# Patient Record
Sex: FEMALE | Race: WHITE | NOT HISPANIC OR LATINO
[De-identification: names, ages, dates, MRNs, and addresses within clinical notes are randomized per-mention and may not be internally consistent; named-entity substitution may affect disease eponyms.]

---

## 2019-07-09 PROBLEM — Z00.00 ENCOUNTER FOR PREVENTIVE HEALTH EXAMINATION: Status: ACTIVE | Noted: 2019-07-09

## 2019-07-19 ENCOUNTER — RESULT REVIEW (OUTPATIENT)
Age: 59
End: 2019-07-19

## 2019-07-19 ENCOUNTER — APPOINTMENT (OUTPATIENT)
Dept: MAMMOGRAPHY | Facility: IMAGING CENTER | Age: 59
End: 2019-07-19

## 2019-07-19 ENCOUNTER — OUTPATIENT (OUTPATIENT)
Dept: OUTPATIENT SERVICES | Facility: HOSPITAL | Age: 59
LOS: 1 days | End: 2019-07-19
Payer: COMMERCIAL

## 2019-07-19 DIAGNOSIS — Z00.8 ENCOUNTER FOR OTHER GENERAL EXAMINATION: ICD-10-CM

## 2019-07-19 PROCEDURE — 19081 BX BREAST 1ST LESION STRTCTC: CPT | Mod: LT

## 2019-07-19 PROCEDURE — 77065 DX MAMMO INCL CAD UNI: CPT

## 2019-07-19 PROCEDURE — A4648: CPT

## 2019-07-19 PROCEDURE — 19081 BX BREAST 1ST LESION STRTCTC: CPT

## 2019-07-19 PROCEDURE — 88305 TISSUE EXAM BY PATHOLOGIST: CPT | Mod: 26

## 2019-07-19 PROCEDURE — 77065 DX MAMMO INCL CAD UNI: CPT | Mod: 26,LT

## 2019-07-19 PROCEDURE — 88305 TISSUE EXAM BY PATHOLOGIST: CPT

## 2019-11-06 ENCOUNTER — OUTPATIENT (OUTPATIENT)
Dept: OUTPATIENT SERVICES | Facility: HOSPITAL | Age: 59
LOS: 1 days | End: 2019-11-06
Payer: COMMERCIAL

## 2019-11-06 VITALS
HEIGHT: 61 IN | TEMPERATURE: 99 F | RESPIRATION RATE: 16 BRPM | DIASTOLIC BLOOD PRESSURE: 80 MMHG | SYSTOLIC BLOOD PRESSURE: 120 MMHG | HEART RATE: 82 BPM | WEIGHT: 164.02 LBS | OXYGEN SATURATION: 98 %

## 2019-11-06 DIAGNOSIS — T85.44XA CAPSULAR CONTRACTURE OF BREAST IMPLANT, INITIAL ENCOUNTER: ICD-10-CM

## 2019-11-06 DIAGNOSIS — Z98.890 OTHER SPECIFIED POSTPROCEDURAL STATES: Chronic | ICD-10-CM

## 2019-11-06 DIAGNOSIS — Z86.39 PERSONAL HISTORY OF OTHER ENDOCRINE, NUTRITIONAL AND METABOLIC DISEASE: Chronic | ICD-10-CM

## 2019-11-06 LAB
ANION GAP SERPL CALC-SCNC: 14 MMO/L — SIGNIFICANT CHANGE UP (ref 7–14)
BUN SERPL-MCNC: 12 MG/DL — SIGNIFICANT CHANGE UP (ref 7–23)
CALCIUM SERPL-MCNC: 10 MG/DL — SIGNIFICANT CHANGE UP (ref 8.4–10.5)
CHLORIDE SERPL-SCNC: 102 MMOL/L — SIGNIFICANT CHANGE UP (ref 98–107)
CO2 SERPL-SCNC: 26 MMOL/L — SIGNIFICANT CHANGE UP (ref 22–31)
CREAT SERPL-MCNC: 0.75 MG/DL — SIGNIFICANT CHANGE UP (ref 0.5–1.3)
GLUCOSE SERPL-MCNC: 109 MG/DL — HIGH (ref 70–99)
HCT VFR BLD CALC: 38.9 % — SIGNIFICANT CHANGE UP (ref 34.5–45)
HGB BLD-MCNC: 12 G/DL — SIGNIFICANT CHANGE UP (ref 11.5–15.5)
MCHC RBC-ENTMCNC: 29.7 PG — SIGNIFICANT CHANGE UP (ref 27–34)
MCHC RBC-ENTMCNC: 30.8 % — LOW (ref 32–36)
MCV RBC AUTO: 96.3 FL — SIGNIFICANT CHANGE UP (ref 80–100)
NRBC # FLD: 0 K/UL — SIGNIFICANT CHANGE UP (ref 0–0)
PLATELET # BLD AUTO: 315 K/UL — SIGNIFICANT CHANGE UP (ref 150–400)
PMV BLD: 10.5 FL — SIGNIFICANT CHANGE UP (ref 7–13)
POTASSIUM SERPL-MCNC: 3.8 MMOL/L — SIGNIFICANT CHANGE UP (ref 3.5–5.3)
POTASSIUM SERPL-SCNC: 3.8 MMOL/L — SIGNIFICANT CHANGE UP (ref 3.5–5.3)
RBC # BLD: 4.04 M/UL — SIGNIFICANT CHANGE UP (ref 3.8–5.2)
RBC # FLD: 13.5 % — SIGNIFICANT CHANGE UP (ref 10.3–14.5)
SODIUM SERPL-SCNC: 142 MMOL/L — SIGNIFICANT CHANGE UP (ref 135–145)
WBC # BLD: 7.04 K/UL — SIGNIFICANT CHANGE UP (ref 3.8–10.5)
WBC # FLD AUTO: 7.04 K/UL — SIGNIFICANT CHANGE UP (ref 3.8–10.5)

## 2019-11-06 PROCEDURE — 93010 ELECTROCARDIOGRAM REPORT: CPT

## 2019-11-06 RX ORDER — SODIUM CHLORIDE 9 MG/ML
3 INJECTION INTRAMUSCULAR; INTRAVENOUS; SUBCUTANEOUS EVERY 8 HOURS
Refills: 0 | Status: DISCONTINUED | OUTPATIENT
Start: 2019-11-19 | End: 2020-01-09

## 2019-11-06 RX ORDER — SODIUM CHLORIDE 9 MG/ML
1000 INJECTION, SOLUTION INTRAVENOUS
Refills: 0 | Status: DISCONTINUED | OUTPATIENT
Start: 2019-11-19 | End: 2020-01-09

## 2019-11-06 NOTE — H&P PST ADULT - NEGATIVE CARDIOVASCULAR SYMPTOMS
no chest pain/no orthopnea/no paroxysmal nocturnal dyspnea/no peripheral edema/no palpitations/no claudication/no dyspnea on exertion

## 2019-11-06 NOTE — H&P PST ADULT - NSICDXPROBLEM_GEN_ALL_CORE_FT
PROBLEM DIAGNOSES  Problem: Capsular contracture of breast implant, initial encounter  Assessment and Plan: Scheduled for bilateral implant removal, bilateral breast lift on 11/19/19. Pre op instructions, famotidine, chlorhexidine gluconate soap given and explainaed. Pt verbalized understanding.

## 2019-11-06 NOTE — H&P PST ADULT - NEGATIVE GENERAL SYMPTOMS
no anorexia/no sweating/no fever/no weight gain/no polyuria/no malaise/no weight loss/no fatigue/no polydipsia/no chills

## 2019-11-06 NOTE — H&P PST ADULT - NSICDXPASTSURGICALHX_GEN_ALL_CORE_FT
PAST SURGICAL HISTORY:  History of augmentation of both breasts 02/2004 PAST SURGICAL HISTORY:  History of augmentation of both breasts 02/2004    History of obesity Endoscopic balloon procedure;  05/2018

## 2019-11-06 NOTE — H&P PST ADULT - NEGATIVE BREAST SYMPTOMS
no breast tenderness R/no nipple discharge L/no breast tenderness L/no breast lump R/no breast lump L/no nipple discharge R

## 2019-11-06 NOTE — H&P PST ADULT - NEGATIVE NEUROLOGICAL SYMPTOMS
no focal seizures/no syncope/no difficulty walking/no transient paralysis/no generalized seizures/no weakness/no tremors/no paresthesias/no vertigo/no loss of sensation

## 2019-11-06 NOTE — H&P PST ADULT - HISTORY OF PRESENT ILLNESS
58 y/o  female with PMH: Dyslipidemia 60 y/o  female with PMH: Dyslipidemia, Obesity presents to PST for pre op evaluation with pre op diagnosis Capsular contracture of breast implant, initial encounter. Now scheduled for bilateral implant removal, bilateral breast lift on 11/19/19

## 2019-11-06 NOTE — H&P PST ADULT - NEGATIVE GENERAL GENITOURINARY SYMPTOMS
no urinary hesitancy/no urine discoloration/normal libido/no dysuria/no hematuria/no renal colic/no flank pain R/no bladder infections/no gas in urine/no flank pain L/normal urinary frequency/no nocturia

## 2019-11-06 NOTE — H&P PST ADULT - NEGATIVE GASTROINTESTINAL SYMPTOMS
no change in bowel habits/no vomiting/no nausea/no diarrhea/no melena/no hematochezia/no jaundice/no hiccoughs/no abdominal pain/no steatorrhea

## 2019-11-06 NOTE — H&P PST ADULT - RS GEN PE MLT RESP DETAILS PC
clear to auscultation bilaterally/no chest wall tenderness/no subcutaneous emphysema/airway patent/no rhonchi/no wheezes/no intercostal retractions/breath sounds equal/respirations non-labored/no rales/good air movement

## 2019-11-06 NOTE — H&P PST ADULT - NEGATIVE OPHTHALMOLOGIC SYMPTOMS
no lacrimation L/no blurred vision R/no discharge R/no pain L/no photophobia/no lacrimation R/no blurred vision L/no pain R/no irritation R/no loss of vision L/no discharge L/no diplopia/no irritation L/no loss of vision R

## 2019-11-19 ENCOUNTER — OUTPATIENT (OUTPATIENT)
Dept: OUTPATIENT SERVICES | Facility: HOSPITAL | Age: 59
LOS: 1 days | Discharge: HOME | End: 2019-11-19
Payer: COMMERCIAL

## 2019-11-19 ENCOUNTER — RESULT REVIEW (OUTPATIENT)
Age: 59
End: 2019-11-19

## 2019-11-19 VITALS
OXYGEN SATURATION: 98 % | TEMPERATURE: 98 F | HEART RATE: 74 BPM | RESPIRATION RATE: 14 BRPM | HEIGHT: 61 IN | DIASTOLIC BLOOD PRESSURE: 64 MMHG | SYSTOLIC BLOOD PRESSURE: 113 MMHG | WEIGHT: 164.02 LBS

## 2019-11-19 DIAGNOSIS — Z86.39 PERSONAL HISTORY OF OTHER ENDOCRINE, NUTRITIONAL AND METABOLIC DISEASE: Chronic | ICD-10-CM

## 2019-11-19 DIAGNOSIS — T85.44XA CAPSULAR CONTRACTURE OF BREAST IMPLANT, INITIAL ENCOUNTER: ICD-10-CM

## 2019-11-19 DIAGNOSIS — Z98.890 OTHER SPECIFIED POSTPROCEDURAL STATES: Chronic | ICD-10-CM

## 2019-11-19 PROCEDURE — 19316 MASTOPEXY: CPT | Mod: RT,LT

## 2019-11-19 PROCEDURE — 19328 RMVL INTACT BREAST IMPLANT: CPT

## 2019-11-19 PROCEDURE — 88305 TISSUE EXAM BY PATHOLOGIST: CPT | Mod: 26

## 2019-11-19 RX ORDER — OXYCODONE HYDROCHLORIDE 5 MG/1
5 TABLET ORAL ONCE
Refills: 0 | Status: DISCONTINUED | OUTPATIENT
Start: 2019-11-19 | End: 2019-11-19

## 2019-11-19 RX ORDER — FENTANYL CITRATE 50 UG/ML
25 INJECTION INTRAVENOUS
Refills: 0 | Status: DISCONTINUED | OUTPATIENT
Start: 2019-11-19 | End: 2019-11-19

## 2019-11-19 RX ORDER — ONDANSETRON 8 MG/1
4 TABLET, FILM COATED ORAL ONCE
Refills: 0 | Status: COMPLETED | OUTPATIENT
Start: 2019-11-19 | End: 2019-11-19

## 2019-11-19 RX ADMIN — FENTANYL CITRATE 25 MICROGRAM(S): 50 INJECTION INTRAVENOUS at 23:30

## 2019-11-19 RX ADMIN — ONDANSETRON 4 MILLIGRAM(S): 8 TABLET, FILM COATED ORAL at 23:15

## 2019-11-19 NOTE — ASU DISCHARGE PLAN (ADULT/PEDIATRIC) - CALL YOUR DOCTOR IF YOU HAVE ANY OF THE FOLLOWING:
Bleeding that does not stop/Fever greater than (need to indicate Fahrenheit or Celsius)/Wound/Surgical Site with redness, or foul smelling discharge or pus/Pain not relieved by Medications/Swelling that gets worse

## 2019-11-19 NOTE — ASU DISCHARGE PLAN (ADULT/PEDIATRIC) - ASU DC SPECIAL INSTRUCTIONSFT
Please follow all instructions from Dr. Geiger.    Please keep your head elevated while in bed.    Keep surgical bra on until cleared.    Please keep your dressing clean, dry, and in place until follow up.    No exercising, strenuous activity, or heavy lifting. Keep your arms below your head and your elbows below your shoulders.    Take medications as prescribed from Dr. Geiger's office.    Please follow up with Dr. Geiger within x1 week after discharge from the hospital. You may call [phone] to schedule an appointment.

## 2019-11-19 NOTE — ASU DISCHARGE PLAN (ADULT/PEDIATRIC) - CARE PROVIDER_API CALL
Jackson Geiger)  Plastic Surgery  [address] [place] [address], [address]  [place] [address]  Phone: [phone]  Fax: [phone]  Follow Up Time:

## 2019-11-19 NOTE — ASU PATIENT PROFILE, ADULT - PSH
History of augmentation of both breasts  02/2004  History of obesity  Endoscopic balloon procedure;  05/2018

## 2019-11-19 NOTE — ASU PATIENT PROFILE, ADULT - REASON FOR ADMISSION, PROFILE
[facility] Initial Evaluation  1/6/2017     Subjective Exam:  Zo Qureshi is a 38 year old female patient presenting to Physical Therapy with the following Primary Symptoms:Bilateral shoulder pain (L>R).  She reports related symptoms in left shoulder spreading no further than the left elbow. Pain in right arm extends down to digits 1-3 with tingling and pain noted in those 3 fingers. Left shoulder pains are described as constant. Right shoulder pain is more intermittent and experiences worsened finger symptoms when she has shoulder pain.  She denies having painful cough/sneeze, saddle anaesthesia, severe night pain, peripheral motor deficit, recent bowel/bladder change, recent vision change, ringing in the ears or pain with swallowing. Left shoulder pain is rated 4/10 at rest, and 7/10 at worst. Right arm pain is 0/10 at rest, 4/10 at worst. History of symptoms: Left shoulder pain began gradually 10/1/16  as the result of no specific episode or injury.  Right arm symptoms began in 8/1/16 as the result of no specific episode or injury. Notes that she started picking up extra work shifts in the summer, moving from 40 hour week to 60 hour week and B symptoms were more intermittent and worsened over the past 4 months. Previous treatment has included Heat, Tylenol, Other medications: muscle relaxants, Rest Supine.   Since onset, both shoulder pains are getting worse.  Current Symptoms are worsened by: work, lying on either side, reaching overhead increases left shoulder pain, reaching behind back with either shoulder, gripping with right hand, carrying objects, washing dishes >10 min. Current Symptoms are relieved by resting, avoiding aggravating factors.   Recent surgical procedure: none   General health as reported by patient is:  good.  Pertinent medical history: She denies any significant current illness or recent hospital admissions. She denies any regonal implanted devices.  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     Patient also c/o right sided abdominal pain that began suddenly in the night on 1/4/16 that woke her up. Was unable to fall asleep, pain improved with Tylenol and was better in the am. Pains returned later in the morning with chills and sweating, went to wellness room at work to rest and pain was not getting better. Saw MD that afternoon (1/5/16) who conducted lab tests which were negative and felt it might be a muscle strain. Pain is constant in nature but can fluctuate in intensity with use of Tylenol. Reports her work does involve twisting and reaching but denies onset of pain or experiencing any similar episode of abdominal pain during work. Has appointment scheduled to follow up with family physician on 1/9/17.    SHOULDER:  Demonstration of Work Posture: Repetitive cervical protrusion, rounding of shoulders, thoracic flexion and fine motor use of hands to work on hearing aids.     Cervical Screen: full ROM without reproduction of any arm symptoms  Elbow Screen: R elbow 5-0-135 pain in triceps with flexion OP, L elbow 4-0-135 pain in back of shoulder with flexion OP    Shoulder ROM: (* indicates patient's pain: superior shoulder pain)   AROM R AROM L PROM R PROM L   Flex 130* 120*     Abd 110* 87*     ER at 0 80 80     Ext/IR T12* L5*       Shoulder Strength:   MMT L MMT R   IR at 0 Weak & Painful Strong & Painful   ER at 0 Weak & Painful Strong & Painful   Flexion Strong & Pain free Strong & Pain free   Abduction Weak & Painful Strong & Painful     Special tests:   L R   Impingement     Empty Can + +   Full Can + +     HPI  System  Physical Exam  General   ROS    Assessment/Plan:      Patient is a 38 year old female with both sides shoulder, right  finger complaints.  At this time, patient's right abdominal pain does not appear to be musculoskeletal in origin due to sudden onset of severe pain without injury and no report of symptoms at work prior to the night pain. Referred patient back to MD regarding abdominal pain and instructed patient to seek emergent medical care if pain worsens significantly or if she has a fever/sweats again.     Patient has the following significant findings with corresponding treatment plan.                Diagnosis 1:  Bilateral shoulder rotator cuff tendinopathy  Pain -  hot/cold therapy, electric stimulation, manual therapy and education  Decreased ROM/flexibility - manual therapy and therapeutic exercise  Decreased strength - therapeutic exercise and therapeutic activities  Impaired muscle performance - neuro re-education  Decreased function - therapeutic activities  Impaired posture - neuro re-education    Therapy Evaluation Codes:   1) History comprised of:   Personal factors that impact the plan of care:      Work hours and repetitive work tasks.    Comorbidity factors that impact the plan of care are:      Numbness/tingling and Suddent onset of right abdominal pain at night.     Medications impacting care: Anti-inflammatory.  2) Examination of Body Systems comprised of:   Body structures and functions that impact the plan of care:      Shoulder.   Activity limitations that impact the plan of care are:      Bathing, Dressing, Grasping, Lifting, Working, Sleeping and Reaching.  3) Clinical presentation characteristics are:   Evolving/Changing.  4) Decision-Making    Moderate complexity using standardized patient assessment instrument and/or measureable assessment of functional outcome.  Cumulative Therapy Evaluation is: Moderate complexity.    Previous and current functional limitations:  (See Goal Flow Sheet for this information)    Short term and Long term goals: (See Goal Flow Sheet for this information)     Communication  ability:  Patient appears to be able to clearly communicate and understand verbal and written communication and follow directions correctly.  Treatment Explanation - The following has been discussed with the patient:   RX ordered/plan of care  Anticipated outcomes  Possible risks and side effects  This patient would benefit from PT intervention to resume normal activities.   Rehab potential is good.    Frequency:  1 X week, once daily  Duration:  for 8 weeks  Discharge Plan:  Achieve all LTG.  Independent in home treatment program.  Reach maximal therapeutic benefit.    Please refer to the daily flowsheet for treatment today, total treatment time and time spent performing 1:1 timed codes.            I am here to have implants from breast removed and bilateral lifts

## 2019-11-20 VITALS
SYSTOLIC BLOOD PRESSURE: 104 MMHG | DIASTOLIC BLOOD PRESSURE: 63 MMHG | RESPIRATION RATE: 15 BRPM | HEART RATE: 83 BPM | OXYGEN SATURATION: 98 % | TEMPERATURE: 98 F

## 2019-11-22 LAB — SURGICAL PATHOLOGY STUDY: SIGNIFICANT CHANGE UP

## 2020-08-19 ENCOUNTER — RESULT REVIEW (OUTPATIENT)
Age: 60
End: 2020-08-19

## 2020-08-19 ENCOUNTER — APPOINTMENT (OUTPATIENT)
Dept: MAMMOGRAPHY | Facility: IMAGING CENTER | Age: 60
End: 2020-08-19
Payer: COMMERCIAL

## 2020-08-19 ENCOUNTER — APPOINTMENT (OUTPATIENT)
Dept: ULTRASOUND IMAGING | Facility: IMAGING CENTER | Age: 60
End: 2020-08-19
Payer: COMMERCIAL

## 2020-08-19 ENCOUNTER — OUTPATIENT (OUTPATIENT)
Dept: OUTPATIENT SERVICES | Facility: HOSPITAL | Age: 60
LOS: 1 days | End: 2020-08-19
Payer: COMMERCIAL

## 2020-08-19 DIAGNOSIS — Z86.39 PERSONAL HISTORY OF OTHER ENDOCRINE, NUTRITIONAL AND METABOLIC DISEASE: Chronic | ICD-10-CM

## 2020-08-19 DIAGNOSIS — Z00.8 ENCOUNTER FOR OTHER GENERAL EXAMINATION: ICD-10-CM

## 2020-08-19 DIAGNOSIS — Z98.890 OTHER SPECIFIED POSTPROCEDURAL STATES: Chronic | ICD-10-CM

## 2020-08-19 PROBLEM — E66.9 OBESITY, UNSPECIFIED: Chronic | Status: ACTIVE | Noted: 2019-11-06

## 2020-08-19 PROBLEM — E78.5 HYPERLIPIDEMIA, UNSPECIFIED: Chronic | Status: ACTIVE | Noted: 2019-11-06

## 2020-08-19 PROCEDURE — 77066 DX MAMMO INCL CAD BI: CPT | Mod: 26

## 2020-08-19 PROCEDURE — 77066 DX MAMMO INCL CAD BI: CPT

## 2020-08-19 PROCEDURE — G0279: CPT | Mod: 26

## 2020-08-19 PROCEDURE — 76641 ULTRASOUND BREAST COMPLETE: CPT | Mod: 26,50

## 2020-08-19 PROCEDURE — 76641 ULTRASOUND BREAST COMPLETE: CPT

## 2020-08-19 PROCEDURE — G0279: CPT

## 2020-08-25 ENCOUNTER — OUTPATIENT (OUTPATIENT)
Dept: OUTPATIENT SERVICES | Facility: HOSPITAL | Age: 60
LOS: 1 days | End: 2020-08-25
Payer: COMMERCIAL

## 2020-08-25 ENCOUNTER — RESULT REVIEW (OUTPATIENT)
Age: 60
End: 2020-08-25

## 2020-08-25 ENCOUNTER — APPOINTMENT (OUTPATIENT)
Dept: MAMMOGRAPHY | Facility: IMAGING CENTER | Age: 60
End: 2020-08-25
Payer: COMMERCIAL

## 2020-08-25 DIAGNOSIS — R92.8 OTHER ABNORMAL AND INCONCLUSIVE FINDINGS ON DIAGNOSTIC IMAGING OF BREAST: ICD-10-CM

## 2020-08-25 DIAGNOSIS — Z86.39 PERSONAL HISTORY OF OTHER ENDOCRINE, NUTRITIONAL AND METABOLIC DISEASE: Chronic | ICD-10-CM

## 2020-08-25 DIAGNOSIS — Z98.890 OTHER SPECIFIED POSTPROCEDURAL STATES: Chronic | ICD-10-CM

## 2020-08-25 PROCEDURE — 19081 BX BREAST 1ST LESION STRTCTC: CPT

## 2020-08-25 PROCEDURE — 77066 DX MAMMO INCL CAD BI: CPT

## 2020-08-25 PROCEDURE — 88305 TISSUE EXAM BY PATHOLOGIST: CPT

## 2020-08-25 PROCEDURE — A4648: CPT

## 2020-08-25 PROCEDURE — 19082 BX BREAST ADD LESION STRTCTC: CPT | Mod: RT

## 2020-08-25 PROCEDURE — 77066 DX MAMMO INCL CAD BI: CPT | Mod: 26

## 2020-08-25 PROCEDURE — 19081 BX BREAST 1ST LESION STRTCTC: CPT | Mod: LT

## 2020-08-25 PROCEDURE — 19082 BX BREAST ADD LESION STRTCTC: CPT

## 2020-08-25 PROCEDURE — 88305 TISSUE EXAM BY PATHOLOGIST: CPT | Mod: 26

## 2020-08-28 ENCOUNTER — RESULT REVIEW (OUTPATIENT)
Age: 60
End: 2020-08-28

## 2020-08-28 ENCOUNTER — APPOINTMENT (OUTPATIENT)
Dept: ULTRASOUND IMAGING | Facility: IMAGING CENTER | Age: 60
End: 2020-08-28
Payer: COMMERCIAL

## 2020-08-28 ENCOUNTER — OUTPATIENT (OUTPATIENT)
Dept: OUTPATIENT SERVICES | Facility: HOSPITAL | Age: 60
LOS: 1 days | End: 2020-08-28
Payer: COMMERCIAL

## 2020-08-28 DIAGNOSIS — Z86.39 PERSONAL HISTORY OF OTHER ENDOCRINE, NUTRITIONAL AND METABOLIC DISEASE: Chronic | ICD-10-CM

## 2020-08-28 DIAGNOSIS — Z98.890 OTHER SPECIFIED POSTPROCEDURAL STATES: Chronic | ICD-10-CM

## 2020-08-28 DIAGNOSIS — R92.8 OTHER ABNORMAL AND INCONCLUSIVE FINDINGS ON DIAGNOSTIC IMAGING OF BREAST: ICD-10-CM

## 2020-08-28 PROCEDURE — 19083 BX BREAST 1ST LESION US IMAG: CPT

## 2020-08-28 PROCEDURE — 77065 DX MAMMO INCL CAD UNI: CPT

## 2020-08-28 PROCEDURE — 88360 TUMOR IMMUNOHISTOCHEM/MANUAL: CPT

## 2020-08-28 PROCEDURE — 88360 TUMOR IMMUNOHISTOCHEM/MANUAL: CPT | Mod: 26

## 2020-08-28 PROCEDURE — 19084 BX BREAST ADD LESION US IMAG: CPT | Mod: LT

## 2020-08-28 PROCEDURE — 19083 BX BREAST 1ST LESION US IMAG: CPT | Mod: LT

## 2020-08-28 PROCEDURE — 19084 BX BREAST ADD LESION US IMAG: CPT

## 2020-08-28 PROCEDURE — 77065 DX MAMMO INCL CAD UNI: CPT | Mod: 26,LT

## 2020-08-28 PROCEDURE — A4648: CPT

## 2020-08-28 PROCEDURE — 88305 TISSUE EXAM BY PATHOLOGIST: CPT

## 2020-08-28 PROCEDURE — 88305 TISSUE EXAM BY PATHOLOGIST: CPT | Mod: 26

## 2020-08-31 LAB — SURGICAL PATHOLOGY STUDY: SIGNIFICANT CHANGE UP

## 2020-09-02 ENCOUNTER — APPOINTMENT (OUTPATIENT)
Dept: SURGICAL ONCOLOGY | Facility: CLINIC | Age: 60
End: 2020-09-02
Payer: COMMERCIAL

## 2020-09-02 VITALS
SYSTOLIC BLOOD PRESSURE: 120 MMHG | HEIGHT: 62 IN | HEART RATE: 68 BPM | WEIGHT: 170 LBS | OXYGEN SATURATION: 99 % | BODY MASS INDEX: 31.28 KG/M2 | DIASTOLIC BLOOD PRESSURE: 78 MMHG

## 2020-09-02 PROCEDURE — 99245 OFF/OP CONSLTJ NEW/EST HI 55: CPT

## 2020-09-02 NOTE — ASSESSMENT
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 I explained that breast cancer can be treated with 2 main surgical approaches. One is breast conserving therapy. The other is mastectomy with or without immediate reconstruction by a plastic surgeon. Breast conserving therapy involves wide excision of the involved area. Negative margins must be achieved with this wide excision. In addition, evaluation of the lymph nodes either with a sentinel lymph node biopsy or an axillary lymph node dissection may be required. This treatment usually requires postoperative radiation to the breast. Treatment with mastectomy also involves evaluation of the lymph node with a sentinel lymph node biopsy or axillary lymph node dissection. Post operative radiation therapy may be needed even after mastectomy in certain advanced cases.\par  \par

## 2020-09-02 NOTE — CONSULT LETTER
[Consult Letter:] : I had the pleasure of evaluating your patient, [unfilled]. [Dear  ___] : Dear  [unfilled], [Please see my note below.] : Please see my note below. [Consult Closing:] : Thank you very much for allowing me to participate in the care of this patient.  If you have any questions, please do not hesitate to contact me. [Sincerely,] : Sincerely, [FreeTextEntry2] : Jackson Geiger MD [FreeTextEntry3] : Husam Rankin M.D.\par

## 2020-09-02 NOTE — HISTORY OF PRESENT ILLNESS
[de-identified] : Ms. Jazlyn Damon is a 60 y/o female with newly diagnosed left breast cancer present for initial consultation. \par \par The patient reportedly felt a lump in her left breast for 3 to 4 months. She has no personal history of cancer. She has a history of implant(s) removed in 2019 and left stereotactic guided biopsy in 2019 - benign.\par \par MMG/[facility] on 8/19/2020: IMPRESSION:\par - New irregular mass with calcifications and nipple retraction in the left retroareolar location on mammography, corresponding to a mass in the left breast 6:00 location 2 cm from the nipple on the concurrent breast ultrasound. Ultrasound-guided core biopsy is recommended for diagnosis.\par - Abnormal left axillary lymph nodes on mammography and sonography, including lymph nodes demonstrating microcalcifications. Ultrasound-guided core biopsy of a representative abnormal appearing left axillary lymph node with calcifications is recommended.\par - Faint loosely grouped calcifications in the upper central left breast and the upper central right breast which are indeterminate. Sampling stereotactic biopsy of both breasts is recommended.\par - Loosely grouped predominantly coarse dystrophic calcifications associated with areas of central lucency in the posterior medial right breast which are thought to represent fat necrosis. Six-month follow-up right diagnostic mammography is recommended.\par BI-RADS 5\par \par 8/28/2020 : FN core biopsy:\par \par 1. Breast, left, 6 o'clock, 2 cm FN- Invasive poorly differentiated ductal carcinoma with areas of micropapillary features.  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 Breast, left, axillary lymph node, core biopsy\par - Invasive poorly differentiated ductal carcinoma with mild lymphocytic infiltrate\par \par Denies nipple discharge, skin changes, inversion or breast pain. Denies constitutional symptoms \par \par Referring MD ...Leslie Geiger

## 2020-09-02 NOTE — PHYSICAL EXAM
[Normal] : supple, no neck mass and thyroid not enlarged [Normal Supraclavicular Lymph Nodes] : normal supraclavicular lymph nodes [Normal Axillary Lymph Nodes] : normal axillary lymph nodes [Normal] : oriented to person, place and time, with appropriate affect [de-identified] : Normal S1, S2.  Regular rate and rhythm.  [de-identified] : see image [de-identified] : Clear breath sounds bilaterally, normal respiratory effort

## 2020-09-08 ENCOUNTER — APPOINTMENT (OUTPATIENT)
Dept: MRI IMAGING | Facility: IMAGING CENTER | Age: 60
End: 2020-09-08
Payer: COMMERCIAL

## 2020-09-08 ENCOUNTER — RESULT REVIEW (OUTPATIENT)
Age: 60
End: 2020-09-08

## 2020-09-08 ENCOUNTER — OUTPATIENT (OUTPATIENT)
Dept: OUTPATIENT SERVICES | Facility: HOSPITAL | Age: 60
LOS: 1 days | End: 2020-09-08
Payer: COMMERCIAL

## 2020-09-08 ENCOUNTER — TRANSCRIPTION ENCOUNTER (OUTPATIENT)
Age: 60
End: 2020-09-08

## 2020-09-08 DIAGNOSIS — Z98.890 OTHER SPECIFIED POSTPROCEDURAL STATES: Chronic | ICD-10-CM

## 2020-09-08 DIAGNOSIS — Z00.8 ENCOUNTER FOR OTHER GENERAL EXAMINATION: ICD-10-CM

## 2020-09-08 DIAGNOSIS — Z86.39 PERSONAL HISTORY OF OTHER ENDOCRINE, NUTRITIONAL AND METABOLIC DISEASE: Chronic | ICD-10-CM

## 2020-09-08 PROCEDURE — A9585: CPT

## 2020-09-08 PROCEDURE — C8908: CPT

## 2020-09-08 PROCEDURE — 77049 MRI BREAST C-+ W/CAD BI: CPT | Mod: 26

## 2020-09-08 PROCEDURE — C8937: CPT

## 2020-09-10 ENCOUNTER — OUTPATIENT (OUTPATIENT)
Dept: OUTPATIENT SERVICES | Facility: HOSPITAL | Age: 60
LOS: 1 days | Discharge: HOME | End: 2020-09-10

## 2020-09-10 DIAGNOSIS — Z86.39 PERSONAL HISTORY OF OTHER ENDOCRINE, NUTRITIONAL AND METABOLIC DISEASE: Chronic | ICD-10-CM

## 2020-09-10 DIAGNOSIS — C50.919 MALIGNANT NEOPLASM OF UNSPECIFIED SITE OF UNSPECIFIED FEMALE BREAST: ICD-10-CM

## 2020-09-10 DIAGNOSIS — Z98.890 OTHER SPECIFIED POSTPROCEDURAL STATES: Chronic | ICD-10-CM

## 2020-09-11 ENCOUNTER — APPOINTMENT (OUTPATIENT)
Dept: HEMATOLOGY ONCOLOGY | Facility: CLINIC | Age: 60
End: 2020-09-11
Payer: COMMERCIAL

## 2020-09-11 ENCOUNTER — RESULT REVIEW (OUTPATIENT)
Age: 60
End: 2020-09-11

## 2020-09-11 VITALS
TEMPERATURE: 98.8 F | HEIGHT: 61.02 IN | DIASTOLIC BLOOD PRESSURE: 70 MMHG | WEIGHT: 171.74 LBS | SYSTOLIC BLOOD PRESSURE: 103 MMHG | BODY MASS INDEX: 32.42 KG/M2 | OXYGEN SATURATION: 98 % | RESPIRATION RATE: 16 BRPM | HEART RATE: 68 BPM

## 2020-09-11 DIAGNOSIS — Z80.0 FAMILY HISTORY OF MALIGNANT NEOPLASM OF DIGESTIVE ORGANS: ICD-10-CM

## 2020-09-11 DIAGNOSIS — Z86.39 PERSONAL HISTORY OF OTHER ENDOCRINE, NUTRITIONAL AND METABOLIC DISEASE: ICD-10-CM

## 2020-09-11 DIAGNOSIS — Z80.3 FAMILY HISTORY OF MALIGNANT NEOPLASM OF BREAST: ICD-10-CM

## 2020-09-11 DIAGNOSIS — R23.2 FLUSHING: ICD-10-CM

## 2020-09-11 DIAGNOSIS — Z80.1 FAMILY HISTORY OF MALIGNANT NEOPLASM OF TRACHEA, BRONCHUS AND LUNG: ICD-10-CM

## 2020-09-11 DIAGNOSIS — Z72.89 OTHER PROBLEMS RELATED TO LIFESTYLE: ICD-10-CM

## 2020-09-11 DIAGNOSIS — Z87.891 PERSONAL HISTORY OF NICOTINE DEPENDENCE: ICD-10-CM

## 2020-09-11 LAB
BASOPHILS # BLD AUTO: 0.04 K/UL — SIGNIFICANT CHANGE UP (ref 0–0.2)
BASOPHILS NFR BLD AUTO: 0.8 % — SIGNIFICANT CHANGE UP (ref 0–2)
EOSINOPHIL # BLD AUTO: 0.09 K/UL — SIGNIFICANT CHANGE UP (ref 0–0.5)
EOSINOPHIL NFR BLD AUTO: 1.7 % — SIGNIFICANT CHANGE UP (ref 0–6)
HCT VFR BLD CALC: 40.1 % — SIGNIFICANT CHANGE UP (ref 34.5–45)
HGB BLD-MCNC: 13.1 G/DL — SIGNIFICANT CHANGE UP (ref 11.5–15.5)
IMM GRANULOCYTES NFR BLD AUTO: 0.2 % — SIGNIFICANT CHANGE UP (ref 0–1.5)
LYMPHOCYTES # BLD AUTO: 1.81 K/UL — SIGNIFICANT CHANGE UP (ref 1–3.3)
LYMPHOCYTES # BLD AUTO: 34.3 % — SIGNIFICANT CHANGE UP (ref 13–44)
MCHC RBC-ENTMCNC: 31.5 PG — SIGNIFICANT CHANGE UP (ref 27–34)
MCHC RBC-ENTMCNC: 32.7 G/DL — SIGNIFICANT CHANGE UP (ref 32–36)
MCV RBC AUTO: 96.4 FL — SIGNIFICANT CHANGE UP (ref 80–100)
MONOCYTES # BLD AUTO: 0.41 K/UL — SIGNIFICANT CHANGE UP (ref 0–0.9)
MONOCYTES NFR BLD AUTO: 7.8 % — SIGNIFICANT CHANGE UP (ref 2–14)
NEUTROPHILS # BLD AUTO: 2.91 K/UL — SIGNIFICANT CHANGE UP (ref 1.8–7.4)
NEUTROPHILS NFR BLD AUTO: 55.2 % — SIGNIFICANT CHANGE UP (ref 43–77)
NRBC # BLD: 0 /100 WBCS — SIGNIFICANT CHANGE UP (ref 0–0)
PLATELET # BLD AUTO: 246 K/UL — SIGNIFICANT CHANGE UP (ref 150–400)
RBC # BLD: 4.16 M/UL — SIGNIFICANT CHANGE UP (ref 3.8–5.2)
RBC # FLD: 13.2 % — SIGNIFICANT CHANGE UP (ref 10.3–14.5)
WBC # BLD: 5.27 K/UL — SIGNIFICANT CHANGE UP (ref 3.8–10.5)
WBC # FLD AUTO: 5.27 K/UL — SIGNIFICANT CHANGE UP (ref 3.8–10.5)

## 2020-09-11 PROCEDURE — 99205 OFFICE O/P NEW HI 60 MIN: CPT

## 2020-09-11 RX ORDER — SIMVASTATIN 80 MG/1
TABLET, FILM COATED ORAL
Refills: 0 | Status: ACTIVE | COMMUNITY

## 2020-09-11 RX ORDER — CHROMIUM 200 MCG
TABLET ORAL
Refills: 0 | Status: ACTIVE | COMMUNITY

## 2020-09-11 NOTE — CONSULT LETTER
[Dear  ___] : Dear  [unfilled], [Consult Letter:] : I had the pleasure of evaluating your patient, [unfilled]. [Please see my note below.] : Please see my note below. [Referral Closing:] : Thank you very much for seeing this patient.  If you have any questions, please do not hesitate to contact me. [Sincerely,] : Sincerely, [FreeTextEntry3] : Cassie Bell MD\par Division of Medical Oncology and Hematology\par [facility]\par [employer] [facility]\par [place], [place] [DrLeslie  ___] : Dr. ENAMORADO

## 2020-09-11 NOTE — PHYSICAL EXAM
[Fully active, able to carry on all pre-disease performance without restriction] : Status 0 - Fully active, able to carry on all pre-disease performance without restriction [Normal] : affect appropriate [de-identified] : BL breasts with mastopexy scars. Left breast with 5x 5 cm hard retroareolar mass. Left nipple flattened/inverted. + Left axillary LN, No SCLn

## 2020-09-11 NOTE — HISTORY OF PRESENT ILLNESS
[de-identified] : Ms.Joanne Damon is a 60 year old female here for an evaluation of breast cancer. Her oncologic history is as follows:\par \par Patient noticed left breast mass for 4 months and presented to plastic surgeon's office. She was sent for breast imaging on 8/19/2020 BIRADS 5 which showed new irregular mass with calcifications and nipple retraction in the left retroareolar location on mammography, corresponding to a mass in the left breast 6:00 location 2 cm from the nipple on the concurrent breast ultrasound. Ultrasound-guided core biopsy is recommended for diagnosis.\par - Abnormal left axillary lymph nodes on mammography and sonography, including lymph nodes demonstrating microcalcifications. Ultrasound-guided core biopsy of a representative abnormal appearing left axillary lymph node with calcifications is recommended.\par - Faint loosely grouped calcifications in the upper central left breast and the upper central right breast which are indeterminate. Sampling stereotactic biopsy of both breasts is recommended.\par - Loosely grouped predominantly coarse dystrophic calcifications associated with areas of central lucency in the posterior medial right breast which are thought to represent fat necrosis. Six-month follow-up right diagnostic mammography is recommended. She underwent New irregular mass with calcifications and nipple retraction in the left retroareolar location on mammography, corresponding to a mass in the left breast 6:00 location 2 cm from the nipple on the concurrent breast ultrasound. Ultrasound-guided core biopsy is recommended for diagnosis.\par  She had a left, 6 o'clock, 2 cm FN, core biopsy on on 8/28/2020 which showed Invasive poorly differentiated ductal carcinoma with areas of micropapillary features\par - Estella score 9/9 (3 + 3 + 3)\par - Invasive tumor measures at least 1.0 cm\par - Ductal carcinoma in situ (focal), cribriform pattern with\par high nuclear grade and central necrosis\par \par  Breast, left, axillary lymph node, core biopsy\par - Invasive poorly differentiated ductal carcinoma with mild lymphocytic infiltrate\par ER+ 80%,MA+ 50%, HER 2 + breast cancer\par \par She has been on Prempro x 7 years for hot flashes. She is off now x 2 weeks. Hot flashes are ok\par She had breast augmentation with Dr Geiger. Implants exchanged 2019.  Benign biopsy in 2019

## 2020-09-11 NOTE — REASON FOR VISIT
[Initial Consultation] : an initial consultation [Other: _____] : [unfilled] [FreeTextEntry2] : ER+ 80%,AR+ 50%, HER 2 + breast cancer

## 2020-09-11 NOTE — ASSESSMENT
[FreeTextEntry1] : Ms. KELSY CRANE is a 60 year old female with clinically T2 N1 Mx poorly differentiated invasive ductal carcinoma ER/NE positive and HER-2/lazara POSITIVE. \par \par I reviewed the role of surgery, chemotherapy, radiation therapy and endocrine therapy for curative treatment of breast cancer. I discussed the role of neoadjuvant chemotherapy with the patient including improved surgical outcomes, and early control of occult metastatic disease. We reviewed importance of complete pathological response in guiding subsequent HER2 therapy. There is a small risk of tumor progression while on chemotherapy due to chemotherapy resistant disease. \par \par I recommended neoadjuvant chemotherapy with Taxotere, Carboplatin, Herceptin and Perjeta every 3 weeks for 6 cycles followed by anti-HER 2 therapy for one year based on BCIRG 006 study data. We also discussed the role of TDM1 for residual disease in the adjuvant setting based on KASSANDRA study. I would NOT add Adriamycin to trastuzumab based chemotherapy due to risk of cardiotoxicity and a small but real risk of leukemia with Adriamycin. She will receive Onpro/neulasta for Count support. \par \par I discussed the expected and unexpected side effects of chemotherapy, including but not limited to permanent hair loss, fatigue, change in taste, mouth sores, nausea, vomiting, diarrhea, infusion reaction, allergic reaction, significant myelosuppression, need for blood transfusion, infection, bleeding, cardiac toxicity, neuropathy, kidney injury, nerve pain, muscle pain and detrimental effect on liver and heart function. \par \par I discussed to perform echocardiogram every 3 months and Mediport placement before starting chemotherapy. She will be premedicated with Emend, Aloxi and dexamethasone. I'll obtain blood work including complete metabolic profile, acute hepatitis panel and coagulation profile. My office will schedule mediport placement. \par \par We discussed COLDCAP/ DIGNICAP to prevent hair loss with chemotherapy. We reviewed the logistics, good success rate, and tender haircare during and after treatment. Gave her brochure for more info although she is leaning towards wigs\par \par Patient was very overwhelmed with all the information. My office will set up a PET/CT to complete extent of disease work up. She is considering going for another opinion but not sure. SHe is willing to got for all the recommended test and will schedule chemo today.  I encouraged her to seek a second opinion if she is willing to do so.\par \par We also discussed genetic testing.  She will be referred for genetic testing at a later time.\par \par The patient had plenty of time to ask questions and all of his questions were answered to satisfaction. I gave my office phone number and encouraged to call with any questions or additional information. \par  [Curative] : Goals of care discussed with patient: Curative

## 2020-09-15 LAB
ALBUMIN SERPL ELPH-MCNC: 4.9 G/DL
ALP BLD-CCNC: 47 U/L
ALT SERPL-CCNC: 30 U/L
ANION GAP SERPL CALC-SCNC: 18 MMOL/L
APTT BLD: 29.9 SEC
AST SERPL-CCNC: 26 U/L
BILIRUB SERPL-MCNC: 0.3 MG/DL
BUN SERPL-MCNC: 13 MG/DL
CALCIUM SERPL-MCNC: 10 MG/DL
CHLORIDE SERPL-SCNC: 102 MMOL/L
CO2 SERPL-SCNC: 22 MMOL/L
CREAT SERPL-MCNC: 0.71 MG/DL
GLUCOSE SERPL-MCNC: 108 MG/DL
HAV IGM SER QL: NONREACTIVE
HBV CORE IGM SER QL: NONREACTIVE
HBV SURFACE AG SER QL: NONREACTIVE
HCV AB SER QL: NONREACTIVE
HCV S/CO RATIO: 0.15 S/CO
INR PPP: 0.96 RATIO
POTASSIUM SERPL-SCNC: 4.3 MMOL/L
PROT SERPL-MCNC: 7.5 G/DL
PT BLD: 11.4 SEC
SODIUM SERPL-SCNC: 143 MMOL/L

## 2020-09-16 ENCOUNTER — APPOINTMENT (OUTPATIENT)
Dept: MRI IMAGING | Facility: IMAGING CENTER | Age: 60
End: 2020-09-16
Payer: COMMERCIAL

## 2020-09-16 ENCOUNTER — OUTPATIENT (OUTPATIENT)
Dept: OUTPATIENT SERVICES | Facility: HOSPITAL | Age: 60
LOS: 1 days | End: 2020-09-16
Payer: COMMERCIAL

## 2020-09-16 ENCOUNTER — RESULT REVIEW (OUTPATIENT)
Age: 60
End: 2020-09-16

## 2020-09-16 DIAGNOSIS — Z98.890 OTHER SPECIFIED POSTPROCEDURAL STATES: Chronic | ICD-10-CM

## 2020-09-16 DIAGNOSIS — Z00.8 ENCOUNTER FOR OTHER GENERAL EXAMINATION: ICD-10-CM

## 2020-09-16 DIAGNOSIS — Z86.39 PERSONAL HISTORY OF OTHER ENDOCRINE, NUTRITIONAL AND METABOLIC DISEASE: Chronic | ICD-10-CM

## 2020-09-16 PROCEDURE — A9585: CPT

## 2020-09-16 PROCEDURE — 88305 TISSUE EXAM BY PATHOLOGIST: CPT | Mod: 26

## 2020-09-16 PROCEDURE — 19085 BX BREAST 1ST LESION MR IMAG: CPT | Mod: RT

## 2020-09-16 PROCEDURE — 19085 BX BREAST 1ST LESION MR IMAG: CPT

## 2020-09-16 PROCEDURE — 77065 DX MAMMO INCL CAD UNI: CPT | Mod: 26,RT

## 2020-09-16 PROCEDURE — 88305 TISSUE EXAM BY PATHOLOGIST: CPT

## 2020-09-16 PROCEDURE — 77065 DX MAMMO INCL CAD UNI: CPT

## 2020-09-16 RX ORDER — BUSPIRONE HYDROCHLORIDE 10 MG/1
10 TABLET ORAL
Qty: 30 | Refills: 0 | Status: ACTIVE | COMMUNITY
Start: 2020-02-26

## 2020-09-16 RX ORDER — ERGOCALCIFEROL 1.25 MG/1
1.25 MG CAPSULE, LIQUID FILLED ORAL
Qty: 4 | Refills: 0 | Status: ACTIVE | COMMUNITY
Start: 2020-08-05

## 2020-09-18 ENCOUNTER — APPOINTMENT (OUTPATIENT)
Dept: NUCLEAR MEDICINE | Facility: IMAGING CENTER | Age: 60
End: 2020-09-18

## 2020-09-18 ENCOUNTER — OUTPATIENT (OUTPATIENT)
Dept: OUTPATIENT SERVICES | Facility: HOSPITAL | Age: 60
LOS: 1 days | End: 2020-09-18
Payer: COMMERCIAL

## 2020-09-18 DIAGNOSIS — C50.912 MALIGNANT NEOPLASM OF UNSPECIFIED SITE OF LEFT FEMALE BREAST: ICD-10-CM

## 2020-09-18 DIAGNOSIS — Z86.39 PERSONAL HISTORY OF OTHER ENDOCRINE, NUTRITIONAL AND METABOLIC DISEASE: Chronic | ICD-10-CM

## 2020-09-18 DIAGNOSIS — Z98.890 OTHER SPECIFIED POSTPROCEDURAL STATES: Chronic | ICD-10-CM

## 2020-09-18 PROCEDURE — 78815 PET IMAGE W/CT SKULL-THIGH: CPT | Mod: 26,PI

## 2020-09-18 PROCEDURE — 78815 PET IMAGE W/CT SKULL-THIGH: CPT

## 2020-09-18 PROCEDURE — A9552: CPT

## 2020-09-20 DIAGNOSIS — Z01.818 ENCOUNTER FOR OTHER PREPROCEDURAL EXAMINATION: ICD-10-CM

## 2020-09-21 ENCOUNTER — APPOINTMENT (OUTPATIENT)
Dept: DISASTER EMERGENCY | Facility: CLINIC | Age: 60
End: 2020-09-21

## 2020-09-21 LAB — SARS-COV-2 N GENE NPH QL NAA+PROBE: NOT DETECTED

## 2020-09-21 RX ORDER — LIDOCAINE AND PRILOCAINE 25; 25 MG/G; MG/G
2.5-2.5 CREAM TOPICAL
Qty: 1 | Refills: 0 | Status: ACTIVE | COMMUNITY
Start: 2020-09-21 | End: 1900-01-01

## 2020-09-23 ENCOUNTER — FORM ENCOUNTER (OUTPATIENT)
Age: 60
End: 2020-09-23

## 2020-09-24 ENCOUNTER — APPOINTMENT (OUTPATIENT)
Dept: ENDOVASCULAR SURGERY | Facility: CLINIC | Age: 60
End: 2020-09-24
Payer: COMMERCIAL

## 2020-09-24 VITALS
TEMPERATURE: 97.6 F | SYSTOLIC BLOOD PRESSURE: 119 MMHG | HEART RATE: 76 BPM | BODY MASS INDEX: 32.28 KG/M2 | WEIGHT: 171 LBS | RESPIRATION RATE: 15 BRPM | HEIGHT: 61 IN | OXYGEN SATURATION: 96 % | DIASTOLIC BLOOD PRESSURE: 72 MMHG

## 2020-09-24 PROCEDURE — 77001 FLUOROGUIDE FOR VEIN DEVICE: CPT

## 2020-09-24 PROCEDURE — 76937 US GUIDE VASCULAR ACCESS: CPT

## 2020-09-24 PROCEDURE — 36561 INSERT TUNNELED CV CATH: CPT

## 2020-09-24 PROCEDURE — 99202 OFFICE O/P NEW SF 15 MIN: CPT | Mod: 25

## 2020-09-24 NOTE — PROCEDURE
[D/C IV on discharge] : D/C IV on discharge [Resume diet] : resume diet [Site check for bleeding/hematoma] : Site check for bleeding/hematoma [Vital signs on admission the q 15 mins x2] : Vital signs on admission the q 15 mins x2 [FreeTextEntry1] : right  arm mediport insertion

## 2020-09-24 NOTE — ASSESSMENT
[FreeTextEntry1] : Pt seen and assessed for placement of port for venous access.  Chart reviewed, imaging and labs evaluated and acceptable for intervention. Consent obtained with risks, benefits explained.  Will place port with sedation.\par \par 8 Indonesian powerport placed using US and fluoro guidance.  Tip in SVC.  May use immediately.  EBL=minimal.  Full report to follow.\par

## 2020-09-24 NOTE — PAST MEDICAL HISTORY
[Increasing age ( >40 years old)] : Increasing age ( >40 years old) [No therapy indicated for cases scheduled for less than one hour] : No therapy indicated for cases scheduled for less than one hour. [FreeTextEntry1] : Malignant Hyperthermia Screening Tool and Risk of Bleeding Assessment \par \par Ms. KELSY CRANE denies family of unexpected death following Anesthesia or Exercise.\par Denies Family history of Malignant Hyperthermia, Muscle or Neuromuscular disorder and High Temperature  following exercise.\par \par Ms. KELSY CRANE denies history of Muscle Spasm, Dark or Chocolate- Colored and Unanticipated fever immediately following anaesthesia or serious exercise.\par Ms. KELSY CRANE also denies bleeding tendencies/Risks of Bleeding.\par

## 2020-09-24 NOTE — HISTORY OF PRESENT ILLNESS
[FreeTextEntry1] : alert and oriented x 3 \par accompanied by friend \par no reported falls\par covid negative 9/22/2020 [FreeTextEntry5] : 1 am [FreeTextEntry6] : Dr Hickman

## 2020-09-25 ENCOUNTER — APPOINTMENT (OUTPATIENT)
Dept: CARDIOLOGY | Facility: CLINIC | Age: 60
End: 2020-09-25
Payer: COMMERCIAL

## 2020-09-25 PROCEDURE — 93306 TTE W/DOPPLER COMPLETE: CPT

## 2020-09-29 RX ORDER — METOCLOPRAMIDE 10 MG/1
10 TABLET ORAL EVERY 6 HOURS
Qty: 60 | Refills: 3 | Status: ACTIVE | COMMUNITY
Start: 2020-09-29 | End: 1900-01-01

## 2020-09-30 ENCOUNTER — RESULT REVIEW (OUTPATIENT)
Age: 60
End: 2020-09-30

## 2020-09-30 ENCOUNTER — APPOINTMENT (OUTPATIENT)
Dept: INFUSION THERAPY | Facility: HOSPITAL | Age: 60
End: 2020-09-30

## 2020-09-30 ENCOUNTER — APPOINTMENT (OUTPATIENT)
Dept: HEMATOLOGY ONCOLOGY | Facility: CLINIC | Age: 60
End: 2020-09-30
Payer: COMMERCIAL

## 2020-09-30 VITALS
HEART RATE: 90 BPM | BODY MASS INDEX: 32.51 KG/M2 | TEMPERATURE: 99.6 F | RESPIRATION RATE: 16 BRPM | HEIGHT: 61.02 IN | WEIGHT: 172.18 LBS | SYSTOLIC BLOOD PRESSURE: 127 MMHG | OXYGEN SATURATION: 97 % | DIASTOLIC BLOOD PRESSURE: 85 MMHG

## 2020-09-30 PROBLEM — C50.912 MALIGNANT NEOPLASM OF UNSPECIFIED SITE OF LEFT FEMALE BREAST: Chronic | Status: ACTIVE | Noted: 2020-09-29

## 2020-09-30 LAB
BASOPHILS # BLD AUTO: 0.02 K/UL — SIGNIFICANT CHANGE UP (ref 0–0.2)
BASOPHILS NFR BLD AUTO: 0.2 % — SIGNIFICANT CHANGE UP (ref 0–2)
EOSINOPHIL # BLD AUTO: 0.01 K/UL — SIGNIFICANT CHANGE UP (ref 0–0.5)
EOSINOPHIL NFR BLD AUTO: 0.1 % — SIGNIFICANT CHANGE UP (ref 0–6)
HCT VFR BLD CALC: 37.7 % — SIGNIFICANT CHANGE UP (ref 34.5–45)
HGB BLD-MCNC: 12.7 G/DL — SIGNIFICANT CHANGE UP (ref 11.5–15.5)
IMM GRANULOCYTES NFR BLD AUTO: 0.7 % — SIGNIFICANT CHANGE UP (ref 0–1.5)
LYMPHOCYTES # BLD AUTO: 1.33 K/UL — SIGNIFICANT CHANGE UP (ref 1–3.3)
LYMPHOCYTES # BLD AUTO: 11.5 % — LOW (ref 13–44)
MCHC RBC-ENTMCNC: 31.4 PG — SIGNIFICANT CHANGE UP (ref 27–34)
MCHC RBC-ENTMCNC: 33.7 G/DL — SIGNIFICANT CHANGE UP (ref 32–36)
MCV RBC AUTO: 93.1 FL — SIGNIFICANT CHANGE UP (ref 80–100)
MONOCYTES # BLD AUTO: 0.6 K/UL — SIGNIFICANT CHANGE UP (ref 0–0.9)
MONOCYTES NFR BLD AUTO: 5.2 % — SIGNIFICANT CHANGE UP (ref 2–14)
NEUTROPHILS # BLD AUTO: 9.57 K/UL — HIGH (ref 1.8–7.4)
NEUTROPHILS NFR BLD AUTO: 82.3 % — HIGH (ref 43–77)
NRBC # BLD: 0 /100 WBCS — SIGNIFICANT CHANGE UP (ref 0–0)
PLATELET # BLD AUTO: 252 K/UL — SIGNIFICANT CHANGE UP (ref 150–400)
RBC # BLD: 4.05 M/UL — SIGNIFICANT CHANGE UP (ref 3.8–5.2)
RBC # FLD: 13 % — SIGNIFICANT CHANGE UP (ref 10.3–14.5)
WBC # BLD: 11.61 K/UL — HIGH (ref 3.8–10.5)
WBC # FLD AUTO: 11.61 K/UL — HIGH (ref 3.8–10.5)

## 2020-09-30 PROCEDURE — 99215 OFFICE O/P EST HI 40 MIN: CPT

## 2020-09-30 RX ORDER — ESTROGEN,CON/M-PROGEST ACET 0.625 (14)
1 TABLET ORAL
Qty: 0 | Refills: 0 | DISCHARGE

## 2020-10-01 DIAGNOSIS — R11.2 NAUSEA WITH VOMITING, UNSPECIFIED: ICD-10-CM

## 2020-10-01 DIAGNOSIS — Z51.11 ENCOUNTER FOR ANTINEOPLASTIC CHEMOTHERAPY: ICD-10-CM

## 2020-10-01 DIAGNOSIS — Z51.89 ENCOUNTER FOR OTHER SPECIFIED AFTERCARE: ICD-10-CM

## 2020-10-07 ENCOUNTER — RESULT REVIEW (OUTPATIENT)
Age: 60
End: 2020-10-07

## 2020-10-07 ENCOUNTER — APPOINTMENT (OUTPATIENT)
Dept: INFUSION THERAPY | Facility: HOSPITAL | Age: 60
End: 2020-10-07

## 2020-10-07 ENCOUNTER — LABORATORY RESULT (OUTPATIENT)
Age: 60
End: 2020-10-07

## 2020-10-07 ENCOUNTER — APPOINTMENT (OUTPATIENT)
Dept: HEMATOLOGY ONCOLOGY | Facility: CLINIC | Age: 60
End: 2020-10-07
Payer: COMMERCIAL

## 2020-10-07 VITALS
OXYGEN SATURATION: 97 % | TEMPERATURE: 96.4 F | HEIGHT: 61.85 IN | HEART RATE: 102 BPM | BODY MASS INDEX: 31.24 KG/M2 | RESPIRATION RATE: 17 BRPM | WEIGHT: 169.75 LBS | SYSTOLIC BLOOD PRESSURE: 113 MMHG | DIASTOLIC BLOOD PRESSURE: 76 MMHG

## 2020-10-07 DIAGNOSIS — B37.0 CANDIDAL STOMATITIS: ICD-10-CM

## 2020-10-07 LAB
BASOPHILS # BLD AUTO: 0 K/UL — SIGNIFICANT CHANGE UP (ref 0–0.2)
BASOPHILS NFR BLD AUTO: 0 % — SIGNIFICANT CHANGE UP (ref 0–2)
EOSINOPHIL # BLD AUTO: 0 K/UL — SIGNIFICANT CHANGE UP (ref 0–0.5)
EOSINOPHIL NFR BLD AUTO: 0 % — SIGNIFICANT CHANGE UP (ref 0–6)
HCT VFR BLD CALC: 36 % — SIGNIFICANT CHANGE UP (ref 34.5–45)
HGB BLD-MCNC: 12.2 G/DL — SIGNIFICANT CHANGE UP (ref 11.5–15.5)
LYMPHOCYTES # BLD AUTO: 20 % — SIGNIFICANT CHANGE UP (ref 13–44)
LYMPHOCYTES # BLD AUTO: 4.6 K/UL — HIGH (ref 1–3.3)
MCHC RBC-ENTMCNC: 31.8 PG — SIGNIFICANT CHANGE UP (ref 27–34)
MCHC RBC-ENTMCNC: 33.9 G/DL — SIGNIFICANT CHANGE UP (ref 32–36)
MCV RBC AUTO: 93.8 FL — SIGNIFICANT CHANGE UP (ref 80–100)
MONOCYTES # BLD AUTO: 1.15 K/UL — HIGH (ref 0–0.9)
MONOCYTES NFR BLD AUTO: 5 % — SIGNIFICANT CHANGE UP (ref 2–14)
MYELOCYTES NFR BLD: 1 % — HIGH (ref 0–0)
NEUTROPHILS # BLD AUTO: 17.01 K/UL — HIGH (ref 1.8–7.4)
NEUTROPHILS NFR BLD AUTO: 70 % — SIGNIFICANT CHANGE UP (ref 43–77)
NEUTS BAND # BLD: 4 % — SIGNIFICANT CHANGE UP (ref 0–8)
NRBC # BLD: 0 /100 — SIGNIFICANT CHANGE UP (ref 0–0)
NRBC # BLD: SIGNIFICANT CHANGE UP /100 WBCS (ref 0–0)
PLAT MORPH BLD: NORMAL — SIGNIFICANT CHANGE UP
PLATELET # BLD AUTO: 177 K/UL — SIGNIFICANT CHANGE UP (ref 150–400)
RBC # BLD: 3.84 M/UL — SIGNIFICANT CHANGE UP (ref 3.8–5.2)
RBC # FLD: 13 % — SIGNIFICANT CHANGE UP (ref 10.3–14.5)
RBC BLD AUTO: SIGNIFICANT CHANGE UP
WBC # BLD: 22.99 K/UL — HIGH (ref 3.8–10.5)
WBC # FLD AUTO: 22.99 K/UL — HIGH (ref 3.8–10.5)

## 2020-10-07 PROCEDURE — 99215 OFFICE O/P EST HI 40 MIN: CPT

## 2020-10-08 PROBLEM — B37.0 THRUSH, ORAL: Status: ACTIVE | Noted: 2020-10-08

## 2020-10-17 ENCOUNTER — OUTPATIENT (OUTPATIENT)
Dept: OUTPATIENT SERVICES | Facility: HOSPITAL | Age: 60
LOS: 1 days | Discharge: HOME | End: 2020-10-17

## 2020-10-17 DIAGNOSIS — Z86.39 PERSONAL HISTORY OF OTHER ENDOCRINE, NUTRITIONAL AND METABOLIC DISEASE: Chronic | ICD-10-CM

## 2020-10-17 DIAGNOSIS — C50.919 MALIGNANT NEOPLASM OF UNSPECIFIED SITE OF UNSPECIFIED FEMALE BREAST: ICD-10-CM

## 2020-10-17 DIAGNOSIS — Z98.890 OTHER SPECIFIED POSTPROCEDURAL STATES: Chronic | ICD-10-CM

## 2020-10-20 ENCOUNTER — RESULT REVIEW (OUTPATIENT)
Age: 60
End: 2020-10-20

## 2020-10-20 ENCOUNTER — APPOINTMENT (OUTPATIENT)
Dept: INFUSION THERAPY | Facility: HOSPITAL | Age: 60
End: 2020-10-20

## 2020-10-20 LAB
BASOPHILS # BLD AUTO: 0.02 K/UL — SIGNIFICANT CHANGE UP (ref 0–0.2)
BASOPHILS NFR BLD AUTO: 0.2 % — SIGNIFICANT CHANGE UP (ref 0–2)
EOSINOPHIL # BLD AUTO: 0 K/UL — SIGNIFICANT CHANGE UP (ref 0–0.5)
EOSINOPHIL NFR BLD AUTO: 0 % — SIGNIFICANT CHANGE UP (ref 0–6)
HCT VFR BLD CALC: 33.5 % — LOW (ref 34.5–45)
HGB BLD-MCNC: 11.3 G/DL — LOW (ref 11.5–15.5)
IMM GRANULOCYTES NFR BLD AUTO: 1.7 % — HIGH (ref 0–1.5)
LYMPHOCYTES # BLD AUTO: 1.44 K/UL — SIGNIFICANT CHANGE UP (ref 1–3.3)
LYMPHOCYTES # BLD AUTO: 13.5 % — SIGNIFICANT CHANGE UP (ref 13–44)
MCHC RBC-ENTMCNC: 31.6 PG — SIGNIFICANT CHANGE UP (ref 27–34)
MCHC RBC-ENTMCNC: 33.7 G/DL — SIGNIFICANT CHANGE UP (ref 32–36)
MCV RBC AUTO: 93.6 FL — SIGNIFICANT CHANGE UP (ref 80–100)
MONOCYTES # BLD AUTO: 0.48 K/UL — SIGNIFICANT CHANGE UP (ref 0–0.9)
MONOCYTES NFR BLD AUTO: 4.5 % — SIGNIFICANT CHANGE UP (ref 2–14)
NEUTROPHILS # BLD AUTO: 8.54 K/UL — HIGH (ref 1.8–7.4)
NEUTROPHILS NFR BLD AUTO: 80.1 % — HIGH (ref 43–77)
NRBC # BLD: 0 /100 WBCS — SIGNIFICANT CHANGE UP (ref 0–0)
PLATELET # BLD AUTO: 363 K/UL — SIGNIFICANT CHANGE UP (ref 150–400)
RBC # BLD: 3.58 M/UL — LOW (ref 3.8–5.2)
RBC # FLD: 13.2 % — SIGNIFICANT CHANGE UP (ref 10.3–14.5)
WBC # BLD: 10.66 K/UL — HIGH (ref 3.8–10.5)
WBC # FLD AUTO: 10.66 K/UL — HIGH (ref 3.8–10.5)

## 2020-10-21 DIAGNOSIS — Z51.11 ENCOUNTER FOR ANTINEOPLASTIC CHEMOTHERAPY: ICD-10-CM

## 2020-10-21 DIAGNOSIS — R11.2 NAUSEA WITH VOMITING, UNSPECIFIED: ICD-10-CM

## 2020-10-21 DIAGNOSIS — Z51.89 ENCOUNTER FOR OTHER SPECIFIED AFTERCARE: ICD-10-CM

## 2020-10-25 NOTE — ASSESSMENT
[Curative] : Goals of care discussed with patient: Curative [FreeTextEntry1] : Ms. KELSY CRANE is a 60 year old female with clinically T2 N1 Mx poorly differentiated invasive ductal carcinoma ER/IA positive and HER-2/lazara POSITIVE. PET/CT 9/2020 ALEXX. Neoadj TCHP started 9/30/2020. \par \par -Breast ca: Ms. KELSY CRANE is on TCHP C1D8 10/7/2020. Tolerating chemo with expected toxicity. Clinically good response. Counts good. Monitor for neuropathy, pulm and cardiotoxicity. Check LFT and lytes today\par - At risk for chemo induced cardiomyopathy: Check ECHO Q3m.LAst echo: 9/25/2020 LVEF 57% next due DEC\par - Chemo induced anemia- Grade 1. No transfusion needed. Monitor counts\par - Mild leukocytosis secondary to ONPRO. No neutropenia or thrombocytopenia. Counts ok to proceed with treatment  \par - Mild mucositis/ oral thrush-  Start Nystatin swish and spit 5cc q 8rs for 4 days. Continue miracle mouth wash or Carafate.  \par - Chemo induced diarrhea- Continue Imodium prn. IV fluids if sx worsen\par - Chemo induced N/V- Will get premedications with Emend, dexamethasone and Aloxi. she will continue dexamethasone for next 3 days for antinausea prophylaxis. She will use Reglan, Compazine as needed. IV fluid next week.\par - GF induced bone pain- take Claritin. \par - Chemo induced dysgeusia, wt loss and fatigue: Encourage p.o. fluids, small frequent meals. 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 \par - Instructed to call office and go directly to the emergency room with fever more than 100.4, shaking chills, productive cough, sore throat, shortness of breath or urinary symptoms. Patient verbalized understanding and agreement.\par - Emotional support provided, all questions answered.\par - She will undergo surgery after 6 cycles. +/- RT. Candidate for endocrine therapy.\par - We also discussed genetic testing.  She will be referred for genetic testing at a later time.\par The patient had plenty of time to ask questions and all of his questions were answered to satisfaction. I gave my office phone number and encouraged to call with any questions or additional information. \par \par RTO D8 each cycle  for MD IV fluids  q 3wks for tx\par D/w  Dr. Cassie Bell\par \par \par \par

## 2020-10-25 NOTE — PHYSICAL EXAM
[Fully active, able to carry on all pre-disease performance without restriction] : Status 0 - Fully active, able to carry on all pre-disease performance without restriction [Normal] : full range of motion and no deformities appreciated [de-identified] : Port in c/d/i [de-identified] : BL breasts with mastopexy scars. Left breast with 5x 5 cm hard retroareolar mass. Left nipple flattened/inverted. + Left axillary LN, No SCLn

## 2020-10-25 NOTE — HISTORY OF PRESENT ILLNESS
[de-identified] : Ms.Joanne Damon is a 60 year old female here for an evaluation of breast cancer. Her oncologic history is as follows:\par \par Patient noticed left breast mass for 4 months and presented to plastic surgeon's office. She was sent for breast imaging on 8/19/2020 BIRADS 5 which showed new irregular mass with calcifications and nipple retraction in the left retroareolar location on mammography, corresponding to a mass in the left breast 6:00 location 2 cm from the nipple on the concurrent breast ultrasound. Ultrasound-guided core biopsy is recommended for diagnosis.\par - Abnormal left axillary lymph nodes on mammography and sonography, including lymph nodes demonstrating microcalcifications. Ultrasound-guided core biopsy of a representative abnormal appearing left axillary lymph node with calcifications is recommended.\par - Faint loosely grouped calcifications in the upper central left breast and the upper central right breast which are indeterminate. Sampling stereotactic biopsy of both breasts is recommended.\par - Loosely grouped predominantly coarse dystrophic calcifications associated with areas of central lucency in the posterior medial right breast which are thought to represent fat necrosis. Six-month follow-up right diagnostic mammography is recommended. She underwent New irregular mass with calcifications and nipple retraction in the left retroareolar location on mammography, corresponding to a mass in the left breast 6:00 location 2 cm from the nipple on the concurrent breast ultrasound. Ultrasound-guided core biopsy is recommended for diagnosis.\par  She had a left, 6 o'clock, 2 cm FN, core biopsy on on 8/28/2020 which showed Invasive poorly differentiated ductal carcinoma with areas of micropapillary features\par - Estella score 9/9 (3 + 3 + 3)\par - Invasive tumor measures at least 1.0 cm\par - Ductal carcinoma in situ (focal), cribriform pattern with\par high nuclear grade and central necrosis\par \par  Breast, left, axillary lymph node, core biopsy\par - Invasive poorly differentiated ductal carcinoma with mild lymphocytic infiltrate\par ER+ 80%,VT+ 50%, HER 2 + breast cancer\par \par She has been on Prempro x 7 years for hot flashes. She is off now x 2 weeks. Hot flashes are ok\par She had breast augmentation with Dr Geiger. Implants exchanged 2019.  Benign biopsy in 2019 [de-identified] : Ms. KELSY CRANE is a 60 year old female with clinically T2 N1 Mx poorly differentiated invasive ductal carcinoma ER/NH positive and HER-2/lazara POSITIVE.  N\par Here to start TCHP C1D1 Counts good\par Chemo s/e discussed\par Medication for symptom management reviewed and questions answered\par 9/25/2020 ECHO LVEF 57%\par [facility]CT [facility] ONC  09/18/2020 No evidence of distant metastasis.\par EKG 2/2020 faxed by PCP , repeat 9/30/2020 done , Chemo consent obtained \par Right side Medport in place, catheter tip at the distal SVC\par Patient is a anxious about stating chemo today\par Emotional support given\par

## 2020-10-25 NOTE — HISTORY OF PRESENT ILLNESS
[de-identified] : Ms.Joanne Damon is a 60 year old female here for an evaluation of breast cancer. Her oncologic history is as follows:\par \par Patient noticed left breast mass for 4 months and presented to plastic surgeon's office. She was sent for breast imaging on 8/19/2020 BIRADS 5 which showed new irregular mass with calcifications and nipple retraction in the left retroareolar location on mammography, corresponding to a mass in the left breast 6:00 location 2 cm from the nipple on the concurrent breast ultrasound. Ultrasound-guided core biopsy is recommended for diagnosis.\par - Abnormal left axillary lymph nodes on mammography and sonography, including lymph nodes demonstrating microcalcifications. Ultrasound-guided core biopsy of a representative abnormal appearing left axillary lymph node with calcifications is recommended.\par - Faint loosely grouped calcifications in the upper central left breast and the upper central right breast which are indeterminate. Sampling stereotactic biopsy of both breasts is recommended.\par - Loosely grouped predominantly coarse dystrophic calcifications associated with areas of central lucency in the posterior medial right breast which are thought to represent fat necrosis. Six-month follow-up right diagnostic mammography is recommended. She underwent New irregular mass with calcifications and nipple retraction in the left retroareolar location on mammography, corresponding to a mass in the left breast 6:00 location 2 cm from the nipple on the concurrent breast ultrasound. Ultrasound-guided core biopsy is recommended for diagnosis.\par  She had a left, 6 o'clock, 2 cm FN, core biopsy on on 8/28/2020 which showed Invasive poorly differentiated ductal carcinoma with areas of micropapillary features\par - [place] score 9/9 (3 + 3 + 3)\par - Invasive tumor measures at least 1.0 cm\par - Ductal carcinoma in situ (focal), cribriform pattern with\par high nuclear grade and central necrosis\par \par  Breast, left, axillary lymph node, core biopsy\par - Invasive poorly differentiated ductal carcinoma with mild lymphocytic infiltrate\par ER+ 80%,MI+ 50%, HER 2 + breast cancer\par \par She has been on Prempro x 7 years for hot flashes. She is off now x 2 weeks. Hot flashes are ok\par She had breast augmentation with Dr Geiger. Implants exchanged 2019.  Benign biopsy in 2019\par \par 9/30/2020\par 9/25/2020 ECHO LVEF 57%\par PETCT SKUL-[facility] ONC  09/18/2020 No evidence of distant metastasis.\par EKG 2/2020 faxed by PCP , repeat 9/30/2020 done , Chemo consent obtained \par Right side Medport in place, catheter tip at the distal SVC [de-identified] : Ms. KELSY CRANE  is here for a follow up appt for left breast ca, dx 8/28/2020  started neoadjuvant TCHP with onpro on 9/30/2020\par TCHP cycle #1D8 today\par She reports mild fatigue and altered taste x 3 days. She was able to function, maintained PO intake, weight stable. She had mild nausea, took Reglan, no vomiting. SHe had diarrhea, used imodium. reports some mouth sores.and white togue, mild Bone pain, no neuropathy, no fevers. No cardio pulm sx\par postmenopausal\par breast mass shrinking but still sizeable\par \par

## 2020-10-25 NOTE — CONSULT LETTER
[Dear  ___] : Dear  [unfilled], [Consult Letter:] : I had the pleasure of evaluating your patient, [unfilled]. [Please see my note below.] : Please see my note below. [Referral Closing:] : Thank you very much for seeing this patient.  If you have any questions, please do not hesitate to contact me. [Sincerely,] : Sincerely, [DrLeslie  ___] : Dr. ENAMORADO [FreeTextEntry3] : Cassie Bell MD\par Division of Medical Oncology and Hematology\par [facility]\par [employer] [facility]\par [place], [place]

## 2020-10-25 NOTE — ASSESSMENT
[Curative] : Goals of care discussed with patient: Curative [FreeTextEntry1] : Ms. KELSY CRANE is a 60 year old female with clinically T2 N1 M0 poorly differentiated invasive ductal carcinoma ER/WA positive and HER-2/lazara POSITIVE.  [facility]CT [facility] ONC  09/18/2020 No evidence of distant metastasis.\par \par -Breast ca: Ms. KELSY CRANE is staring TCHP C1D1 chemo today 9/30/2020. Counts good. Check LFT and lytes today. S/e reviewed, premeds d/w her\par - At risk for chemo induced cardiomyopathy: Check ECHO Q3m.LAst echo: 9/25/2020 LVEF 57% next due DEC \par - Chemo induced diarrhea- Rec Imodium prn. IV fluids if sx worsen\par - Chemo induced N/V- Will get premedications with Emend, dexamethasone and Aloxi. she will continue dexamethasone for next 3 days for antinausea prophylaxis. She will use Reglan, Compazine as needed. IV fluid next week.\par - GF induced bone pain- take Claritin. \par - Chemo induced dysgeusia, wt loss and fatigue: Encourage p.o. fluids, small frequent meals. 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 \par - Instructed to call office and go directly to the emergency room with fever more than 100.4, shaking chills, productive cough, sore throat, shortness of breath or urinary symptoms. Patient verbalized understanding and agreement.\par - Emotional support provided, all questions answered.\par - She will undergo surgery after 6 cycles. +/- RT. Candidate for endocrine therapy.\par We also discussed genetic testing.  She will be referred for genetic testing at a later time.\par The patient had plenty of time to ask questions and all of his questions were answered to satisfaction. I gave my office phone number and encouraged to call with any questions or additional information. \par \par RTO D8 for ND IV fluids  q 3 w fir tx\par D/w and seen in collaboration with Dr. Cassie Bell\par \par \par \par

## 2020-10-25 NOTE — PHYSICAL EXAM
[Fully active, able to carry on all pre-disease performance without restriction] : Status 0 - Fully active, able to carry on all pre-disease performance without restriction [Normal] : affect appropriate [de-identified] : Port in c/d/i [de-identified] : BL breasts with mastopexy scars. Left breast with 5x 5 cm hard retroareolar mass. Left nipple flattened/inverted. + Left axillary LN, No SCLn

## 2020-10-28 ENCOUNTER — LABORATORY RESULT (OUTPATIENT)
Age: 60
End: 2020-10-28

## 2020-10-28 ENCOUNTER — RESULT REVIEW (OUTPATIENT)
Age: 60
End: 2020-10-28

## 2020-10-28 ENCOUNTER — APPOINTMENT (OUTPATIENT)
Dept: INFUSION THERAPY | Facility: HOSPITAL | Age: 60
End: 2020-10-28

## 2020-10-28 ENCOUNTER — APPOINTMENT (OUTPATIENT)
Dept: HEMATOLOGY ONCOLOGY | Facility: CLINIC | Age: 60
End: 2020-10-28
Payer: COMMERCIAL

## 2020-10-28 VITALS
WEIGHT: 167.55 LBS | HEIGHT: 61.85 IN | RESPIRATION RATE: 16 BRPM | BODY MASS INDEX: 30.83 KG/M2 | DIASTOLIC BLOOD PRESSURE: 74 MMHG | SYSTOLIC BLOOD PRESSURE: 108 MMHG | OXYGEN SATURATION: 94 % | HEART RATE: 102 BPM | TEMPERATURE: 98.2 F

## 2020-10-28 DIAGNOSIS — L30.9 DERMATITIS, UNSPECIFIED: ICD-10-CM

## 2020-10-28 LAB
BASOPHILS # BLD AUTO: 0 K/UL — SIGNIFICANT CHANGE UP (ref 0–0.2)
BASOPHILS NFR BLD AUTO: 0 % — SIGNIFICANT CHANGE UP (ref 0–2)
EOSINOPHIL # BLD AUTO: 0 K/UL — SIGNIFICANT CHANGE UP (ref 0–0.5)
EOSINOPHIL NFR BLD AUTO: 0 % — SIGNIFICANT CHANGE UP (ref 0–6)
HCT VFR BLD CALC: 33 % — LOW (ref 34.5–45)
HGB BLD-MCNC: 11.1 G/DL — LOW (ref 11.5–15.5)
LYMPHOCYTES # BLD AUTO: 17 % — SIGNIFICANT CHANGE UP (ref 13–44)
LYMPHOCYTES # BLD AUTO: 2.94 K/UL — SIGNIFICANT CHANGE UP (ref 1–3.3)
MCHC RBC-ENTMCNC: 31.4 PG — SIGNIFICANT CHANGE UP (ref 27–34)
MCHC RBC-ENTMCNC: 33.6 G/DL — SIGNIFICANT CHANGE UP (ref 32–36)
MCV RBC AUTO: 93.2 FL — SIGNIFICANT CHANGE UP (ref 80–100)
MONOCYTES # BLD AUTO: 1.21 K/UL — HIGH (ref 0–0.9)
MONOCYTES NFR BLD AUTO: 7 % — SIGNIFICANT CHANGE UP (ref 2–14)
NEUTROPHILS # BLD AUTO: 13.16 K/UL — HIGH (ref 1.8–7.4)
NEUTROPHILS NFR BLD AUTO: 76 % — SIGNIFICANT CHANGE UP (ref 43–77)
NRBC # BLD: 0 /100 — SIGNIFICANT CHANGE UP (ref 0–0)
NRBC # BLD: SIGNIFICANT CHANGE UP /100 WBCS (ref 0–0)
PLAT MORPH BLD: NORMAL — SIGNIFICANT CHANGE UP
PLATELET # BLD AUTO: 178 K/UL — SIGNIFICANT CHANGE UP (ref 150–400)
RBC # BLD: 3.54 M/UL — LOW (ref 3.8–5.2)
RBC # FLD: 13.3 % — SIGNIFICANT CHANGE UP (ref 10.3–14.5)
RBC BLD AUTO: SIGNIFICANT CHANGE UP
WBC # BLD: 17.31 K/UL — HIGH (ref 3.8–10.5)
WBC # FLD AUTO: 17.31 K/UL — HIGH (ref 3.8–10.5)

## 2020-10-28 PROCEDURE — 99072 ADDL SUPL MATRL&STAF TM PHE: CPT

## 2020-10-28 PROCEDURE — 99215 OFFICE O/P EST HI 40 MIN: CPT

## 2020-10-28 RX ORDER — HYDROCORTISONE 25 MG/G
2.5 OINTMENT TOPICAL TWICE DAILY
Qty: 30 | Refills: 0 | Status: ACTIVE | COMMUNITY
Start: 2020-10-28 | End: 1900-01-01

## 2020-10-28 NOTE — HISTORY OF PRESENT ILLNESS
[de-identified] : Ms.Joanne Damon is a 60 year old female here for an evaluation of breast cancer. Her oncologic history is as follows:\par \par Patient noticed left breast mass for 4 months and presented to plastic surgeon's office. She was sent for breast imaging on 8/19/2020 BIRADS 5 which showed new irregular mass with calcifications and nipple retraction in the left retroareolar location on mammography, corresponding to a mass in the left breast 6:00 location 2 cm from the nipple on the concurrent breast ultrasound. Ultrasound-guided core biopsy is recommended for diagnosis.\par - Abnormal left axillary lymph nodes on mammography and sonography, including lymph nodes demonstrating microcalcifications. Ultrasound-guided core biopsy of a representative abnormal appearing left axillary lymph node with calcifications is recommended.\par - Faint loosely grouped calcifications in the upper central left breast and the upper central right breast which are indeterminate. Sampling stereotactic biopsy of both breasts is recommended.\par - Loosely grouped predominantly coarse dystrophic calcifications associated with areas of central lucency in the posterior medial right breast which are thought to represent fat necrosis. Six-month follow-up right diagnostic mammography is recommended. She underwent New irregular mass with calcifications and nipple retraction in the left retroareolar location on mammography, corresponding to a mass in the left breast 6:00 location 2 cm from the nipple on the concurrent breast ultrasound. Ultrasound-guided core biopsy is recommended for diagnosis.\par  She had a left, 6 o'clock, 2 cm FN, core biopsy on on 8/28/2020 which showed Invasive poorly differentiated ductal carcinoma with areas of micropapillary features\par - Stella score 9/9 (3 + 3 + 3)\par - Invasive tumor measures at least 1.0 cm\par - Ductal carcinoma in situ (focal), cribriform pattern with\par high nuclear grade and central necrosis\par \par  Breast, left, axillary lymph node, core biopsy\par - Invasive poorly differentiated ductal carcinoma with mild lymphocytic infiltrate\par ER+ 80%,IA+ 50%, HER 2 + breast cancer\par \par She has been on Prempro x 7 years for hot flashes. She is off now x 2 weeks. Hot flashes are ok\par She had breast augmentation with Dr Geiger. Implants exchanged 2019.  Benign biopsy in 2019\par \par 9/30/2020\par 9/25/2020 ECHO LVEF 57%\par PETCT SKUL-[facility] ONC  09/18/2020 No evidence of distant metastasis.\par EKG 2/2020 faxed by PCP , repeat 9/30/2020 done , Chemo consent obtained \par Right side Medport in place, catheter tip at the distal SVC [de-identified] : Ms. KELSY CRANE  is here for a follow up appt for left breast ca, dx 8/28/2020  started neoadjuvant TCHP with onpro on 9/30/2020\par TCHP cycle #2 D8 today\par She reports mild fatigue and altered taste x 3 days. She was able to function, maintained PO intake, weight stable. She had mild nausea, took Reglan, no vomiting. SHe had diarrhea, used imodium. She reports some mouth sores, not painful. She has acneiform form rash on her chest and face. Not itchy. She is using eucerin. Rec to use hydrocortisone cream. Also discussed to see derm but she wants to hold off. SHe has mild bone pain, no neuropathy, no fevers. No cardio pulm sx\par postmenopausal\par breast mass shrinking but still sizeable\par \par

## 2020-10-28 NOTE — REASON FOR VISIT
[Follow-Up Visit] : a follow-up [Other: _____] : [unfilled] [FreeTextEntry2] : ER+ 80%,NM+ 50%, HER 2 + breast cancer

## 2020-10-28 NOTE — ASSESSMENT
[Curative] : Goals of care discussed with patient: Curative [FreeTextEntry1] : Ms. KELSY CRANE is a 60 year old female with clinically T2 N1 Mx poorly differentiated invasive ductal carcinoma ER/SD positive and HER-2/lazara POSITIVE. PET/CT 9/2020 ALEXX. Neoadj TCHP started 9/30/2020. \par \par -Breast ca: Ms. KELSY CRANE is on TCHP C2D8 10/28/2020. Tolerating chemo with expected toxicity. Clinically good response. Counts good. Monitor for neuropathy, pulm and cardiotoxicity. Check LFT and lytes today\par - At risk for chemo induced cardiomyopathy: Check ECHO Q3m.LAst echo: 9/25/2020 LVEF 57% next due DEC\par - Chemo induced anemia- Grade 1. No transfusion needed. Monitor counts\par - Mild leukocytosis secondary to ONPRO. No neutropenia or thrombocytopenia. \par - Mild mucositis/ oral thrush-  Start Nystatin swish and spit 5cc q 8rs for 4 days. Continue miracle mouth wash or Carafate.  \par - Chemo induced diarrhea- Continue Imodium prn. IV fluids if sx worsen\par - Chemo induced N/V- Will get premedications with Emend, dexamethasone and Aloxi. she will continue dexamethasone for next 3 days for antinausea prophylaxis. She will use Reglan, Compazine as needed. IV fluid today.\par - GF induced bone pain- take Claritin. \par - Chemo induced dysgeusia and fatigue: Encourage p.o. fluids, small frequent meals. 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 \par - Chemo induced dermatitis: She has acneiform rash on her chest and face. Not itchy. She is using eucerin. Rec to use hydrocortisone cream. Also discussed to see derm but she wants to hold off. \par - Instructed to call office and go directly to the emergency room with fever more than 100.4, shaking chills, productive cough, sore throat, shortness of breath or urinary symptoms. Patient verbalized understanding and agreement.\par - Emotional support provided, all questions answered.\par - She will undergo surgery after 6 cycles. +/- RT. Candidate for endocrine therapy.\par - We also discussed genetic testing.  She will be referred for genetic testing at a later time.\par The patient had plenty of time to ask questions and all of his questions were answered to satisfaction. I gave my office phone number and encouraged to call with any questions or additional information. \par \par RTO D8 each cycle  for MD IV fluids  q 3wks for tx\par

## 2020-10-28 NOTE — PHYSICAL EXAM
[Fully active, able to carry on all pre-disease performance without restriction] : Status 0 - Fully active, able to carry on all pre-disease performance without restriction [Normal] : affect appropriate [de-identified] : Port in c/d/i [de-identified] : BL breasts with mastopexy scars. Left breast with 3x3 cm hard retroareolar mass. Left nipple flattened/inverted. + Left axillary LN, No SCLn

## 2020-10-29 ENCOUNTER — NON-APPOINTMENT (OUTPATIENT)
Age: 60
End: 2020-10-29

## 2020-10-29 PROBLEM — L30.9 DERMATITIS: Status: ACTIVE | Noted: 2020-10-28

## 2020-10-30 ENCOUNTER — LABORATORY RESULT (OUTPATIENT)
Age: 60
End: 2020-10-30

## 2020-10-30 ENCOUNTER — APPOINTMENT (OUTPATIENT)
Dept: INFUSION THERAPY | Facility: HOSPITAL | Age: 60
End: 2020-10-30

## 2020-11-02 ENCOUNTER — NON-APPOINTMENT (OUTPATIENT)
Age: 60
End: 2020-11-02

## 2020-11-11 ENCOUNTER — APPOINTMENT (OUTPATIENT)
Dept: INFUSION THERAPY | Facility: HOSPITAL | Age: 60
End: 2020-11-11

## 2020-11-13 ENCOUNTER — APPOINTMENT (OUTPATIENT)
Dept: INFUSION THERAPY | Facility: HOSPITAL | Age: 60
End: 2020-11-13

## 2020-11-13 ENCOUNTER — LABORATORY RESULT (OUTPATIENT)
Age: 60
End: 2020-11-13

## 2020-11-14 ENCOUNTER — NON-APPOINTMENT (OUTPATIENT)
Age: 60
End: 2020-11-14

## 2020-11-15 ENCOUNTER — NON-APPOINTMENT (OUTPATIENT)
Age: 60
End: 2020-11-15

## 2020-11-15 ENCOUNTER — INPATIENT (INPATIENT)
Facility: HOSPITAL | Age: 60
LOS: 2 days | Discharge: HOME | End: 2020-11-18
Attending: INTERNAL MEDICINE | Admitting: INTERNAL MEDICINE
Payer: COMMERCIAL

## 2020-11-15 VITALS
DIASTOLIC BLOOD PRESSURE: 75 MMHG | RESPIRATION RATE: 20 BRPM | HEIGHT: 61 IN | SYSTOLIC BLOOD PRESSURE: 130 MMHG | OXYGEN SATURATION: 98 % | HEART RATE: 100 BPM | TEMPERATURE: 98 F

## 2020-11-15 DIAGNOSIS — E78.5 HYPERLIPIDEMIA, UNSPECIFIED: ICD-10-CM

## 2020-11-15 DIAGNOSIS — Z29.9 ENCOUNTER FOR PROPHYLACTIC MEASURES, UNSPECIFIED: ICD-10-CM

## 2020-11-15 DIAGNOSIS — T80.212A LOCAL INFECTION DUE TO CENTRAL VENOUS CATHETER, INITIAL ENCOUNTER: ICD-10-CM

## 2020-11-15 DIAGNOSIS — L03.90 CELLULITIS, UNSPECIFIED: ICD-10-CM

## 2020-11-15 DIAGNOSIS — Z98.890 OTHER SPECIFIED POSTPROCEDURAL STATES: Chronic | ICD-10-CM

## 2020-11-15 DIAGNOSIS — Z86.39 PERSONAL HISTORY OF OTHER ENDOCRINE, NUTRITIONAL AND METABOLIC DISEASE: Chronic | ICD-10-CM

## 2020-11-15 DIAGNOSIS — C50.912 MALIGNANT NEOPLASM OF UNSPECIFIED SITE OF LEFT FEMALE BREAST: ICD-10-CM

## 2020-11-15 LAB
ALBUMIN SERPL ELPH-MCNC: 4.4 G/DL — SIGNIFICANT CHANGE UP (ref 3.3–5)
ALP SERPL-CCNC: 67 U/L — SIGNIFICANT CHANGE UP (ref 40–120)
ALT FLD-CCNC: 28 U/L — SIGNIFICANT CHANGE UP (ref 4–33)
ANION GAP SERPL CALC-SCNC: 12 MMO/L — SIGNIFICANT CHANGE UP (ref 7–14)
AST SERPL-CCNC: 23 U/L — SIGNIFICANT CHANGE UP (ref 4–32)
BASE EXCESS BLDV CALC-SCNC: 2.7 MMOL/L — SIGNIFICANT CHANGE UP
BASOPHILS # BLD AUTO: 0.05 K/UL — SIGNIFICANT CHANGE UP (ref 0–0.2)
BASOPHILS NFR BLD AUTO: 0.6 % — SIGNIFICANT CHANGE UP (ref 0–2)
BILIRUB SERPL-MCNC: 0.2 MG/DL — SIGNIFICANT CHANGE UP (ref 0.2–1.2)
BLOOD GAS VENOUS - CREATININE: 0.78 MG/DL — SIGNIFICANT CHANGE UP (ref 0.5–1.3)
BLOOD GAS VENOUS - FIO2: 21 — SIGNIFICANT CHANGE UP
BUN SERPL-MCNC: 14 MG/DL — SIGNIFICANT CHANGE UP (ref 7–23)
CALCIUM SERPL-MCNC: 9.8 MG/DL — SIGNIFICANT CHANGE UP (ref 8.4–10.5)
CHLORIDE BLDV-SCNC: 108 MMOL/L — SIGNIFICANT CHANGE UP (ref 96–108)
CHLORIDE SERPL-SCNC: 103 MMOL/L — SIGNIFICANT CHANGE UP (ref 98–107)
CO2 SERPL-SCNC: 24 MMOL/L — SIGNIFICANT CHANGE UP (ref 22–31)
CREAT SERPL-MCNC: 0.74 MG/DL — SIGNIFICANT CHANGE UP (ref 0.5–1.3)
EOSINOPHIL # BLD AUTO: 0.02 K/UL — SIGNIFICANT CHANGE UP (ref 0–0.5)
EOSINOPHIL NFR BLD AUTO: 0.2 % — SIGNIFICANT CHANGE UP (ref 0–6)
GAS PNL BLDV: 139 MMOL/L — SIGNIFICANT CHANGE UP (ref 136–146)
GLUCOSE BLDV-MCNC: 90 MG/DL — SIGNIFICANT CHANGE UP (ref 70–99)
GLUCOSE SERPL-MCNC: 92 MG/DL — SIGNIFICANT CHANGE UP (ref 70–99)
HCO3 BLDV-SCNC: 25 MMOL/L — SIGNIFICANT CHANGE UP (ref 20–27)
HCT VFR BLD CALC: 33.2 % — LOW (ref 34.5–45)
HCT VFR BLDV CALC: 34.3 % — LOW (ref 34.5–45)
HGB BLD-MCNC: 10.6 G/DL — LOW (ref 11.5–15.5)
HGB BLDV-MCNC: 11.1 G/DL — LOW (ref 11.5–15.5)
IMM GRANULOCYTES NFR BLD AUTO: 0.2 % — SIGNIFICANT CHANGE UP (ref 0–1.5)
LACTATE BLDV-MCNC: 1.5 MMOL/L — SIGNIFICANT CHANGE UP (ref 0.5–2)
LYMPHOCYTES # BLD AUTO: 2 K/UL — SIGNIFICANT CHANGE UP (ref 1–3.3)
LYMPHOCYTES # BLD AUTO: 24.8 % — SIGNIFICANT CHANGE UP (ref 13–44)
MAGNESIUM SERPL-MCNC: 1.8 MG/DL — SIGNIFICANT CHANGE UP (ref 1.6–2.6)
MCHC RBC-ENTMCNC: 31.2 PG — SIGNIFICANT CHANGE UP (ref 27–34)
MCHC RBC-ENTMCNC: 31.9 % — LOW (ref 32–36)
MCV RBC AUTO: 97.6 FL — SIGNIFICANT CHANGE UP (ref 80–100)
MONOCYTES # BLD AUTO: 0.91 K/UL — HIGH (ref 0–0.9)
MONOCYTES NFR BLD AUTO: 11.3 % — SIGNIFICANT CHANGE UP (ref 2–14)
NEUTROPHILS # BLD AUTO: 5.06 K/UL — SIGNIFICANT CHANGE UP (ref 1.8–7.4)
NEUTROPHILS NFR BLD AUTO: 62.9 % — SIGNIFICANT CHANGE UP (ref 43–77)
NRBC # FLD: 0 K/UL — SIGNIFICANT CHANGE UP (ref 0–0)
PCO2 BLDV: 56 MMHG — HIGH (ref 41–51)
PH BLDV: 7.33 PH — SIGNIFICANT CHANGE UP (ref 7.32–7.43)
PHOSPHATE SERPL-MCNC: 3.6 MG/DL — SIGNIFICANT CHANGE UP (ref 2.5–4.5)
PLATELET # BLD AUTO: 391 K/UL — SIGNIFICANT CHANGE UP (ref 150–400)
PMV BLD: 8.9 FL — SIGNIFICANT CHANGE UP (ref 7–13)
PO2 BLDV: 27 MMHG — LOW (ref 35–40)
POTASSIUM BLDV-SCNC: 3.9 MMOL/L — SIGNIFICANT CHANGE UP (ref 3.4–4.5)
POTASSIUM SERPL-MCNC: 4.3 MMOL/L — SIGNIFICANT CHANGE UP (ref 3.5–5.3)
POTASSIUM SERPL-SCNC: 4.3 MMOL/L — SIGNIFICANT CHANGE UP (ref 3.5–5.3)
PROT SERPL-MCNC: 7.5 G/DL — SIGNIFICANT CHANGE UP (ref 6–8.3)
RBC # BLD: 3.4 M/UL — LOW (ref 3.8–5.2)
RBC # FLD: 14.8 % — HIGH (ref 10.3–14.5)
SAO2 % BLDV: 38.7 % — LOW (ref 60–85)
SARS-COV-2 RNA SPEC QL NAA+PROBE: DETECTED
SODIUM SERPL-SCNC: 139 MMOL/L — SIGNIFICANT CHANGE UP (ref 135–145)
WBC # BLD: 8.06 K/UL — SIGNIFICANT CHANGE UP (ref 3.8–10.5)
WBC # FLD AUTO: 8.06 K/UL — SIGNIFICANT CHANGE UP (ref 3.8–10.5)

## 2020-11-15 PROCEDURE — 99285 EMERGENCY DEPT VISIT HI MDM: CPT

## 2020-11-15 PROCEDURE — 99223 1ST HOSP IP/OBS HIGH 75: CPT

## 2020-11-15 PROCEDURE — 71045 X-RAY EXAM CHEST 1 VIEW: CPT | Mod: 26

## 2020-11-15 RX ORDER — CEFEPIME 1 G/1
INJECTION, POWDER, FOR SOLUTION INTRAMUSCULAR; INTRAVENOUS
Refills: 0 | Status: DISCONTINUED | OUTPATIENT
Start: 2020-11-15 | End: 2020-11-16

## 2020-11-15 RX ORDER — VANCOMYCIN HCL 1 G
1000 VIAL (EA) INTRAVENOUS EVERY 12 HOURS
Refills: 0 | Status: DISCONTINUED | OUTPATIENT
Start: 2020-11-16 | End: 2020-11-18

## 2020-11-15 RX ORDER — SIMVASTATIN 20 MG/1
20 TABLET, FILM COATED ORAL AT BEDTIME
Refills: 0 | Status: DISCONTINUED | OUTPATIENT
Start: 2020-11-15 | End: 2020-11-18

## 2020-11-15 RX ORDER — ENOXAPARIN SODIUM 100 MG/ML
40 INJECTION SUBCUTANEOUS EVERY 24 HOURS
Refills: 0 | Status: DISCONTINUED | OUTPATIENT
Start: 2020-11-15 | End: 2020-11-18

## 2020-11-15 RX ORDER — VANCOMYCIN HCL 1 G
1000 VIAL (EA) INTRAVENOUS ONCE
Refills: 0 | Status: COMPLETED | OUTPATIENT
Start: 2020-11-15 | End: 2020-11-15

## 2020-11-15 RX ORDER — CEFEPIME 1 G/1
2000 INJECTION, POWDER, FOR SOLUTION INTRAMUSCULAR; INTRAVENOUS ONCE
Refills: 0 | Status: COMPLETED | OUTPATIENT
Start: 2020-11-15 | End: 2020-11-15

## 2020-11-15 RX ORDER — ACETAMINOPHEN 500 MG
650 TABLET ORAL EVERY 6 HOURS
Refills: 0 | Status: DISCONTINUED | OUTPATIENT
Start: 2020-11-15 | End: 2020-11-18

## 2020-11-15 RX ORDER — LIDOCAINE 4 G/100G
1 CREAM TOPICAL ONCE
Refills: 0 | Status: COMPLETED | OUTPATIENT
Start: 2020-11-15 | End: 2020-11-15

## 2020-11-15 RX ORDER — CEFEPIME 1 G/1
2000 INJECTION, POWDER, FOR SOLUTION INTRAMUSCULAR; INTRAVENOUS EVERY 8 HOURS
Refills: 0 | Status: DISCONTINUED | OUTPATIENT
Start: 2020-11-15 | End: 2020-11-16

## 2020-11-15 RX ORDER — PIPERACILLIN AND TAZOBACTAM 4; .5 G/20ML; G/20ML
3.38 INJECTION, POWDER, LYOPHILIZED, FOR SOLUTION INTRAVENOUS ONCE
Refills: 0 | Status: COMPLETED | OUTPATIENT
Start: 2020-11-15 | End: 2020-11-15

## 2020-11-15 RX ORDER — PANTOPRAZOLE SODIUM 20 MG/1
40 TABLET, DELAYED RELEASE ORAL
Refills: 0 | Status: DISCONTINUED | OUTPATIENT
Start: 2020-11-15 | End: 2020-11-18

## 2020-11-15 RX ORDER — ACETAMINOPHEN 500 MG
650 TABLET ORAL ONCE
Refills: 0 | Status: COMPLETED | OUTPATIENT
Start: 2020-11-15 | End: 2020-11-15

## 2020-11-15 RX ORDER — SENNA PLUS 8.6 MG/1
1 TABLET ORAL
Qty: 0 | Refills: 0 | DISCHARGE

## 2020-11-15 RX ORDER — SODIUM CHLORIDE 9 MG/ML
1000 INJECTION, SOLUTION INTRAVENOUS ONCE
Refills: 0 | Status: COMPLETED | OUTPATIENT
Start: 2020-11-15 | End: 2020-11-15

## 2020-11-15 RX ADMIN — SIMVASTATIN 20 MILLIGRAM(S): 20 TABLET, FILM COATED ORAL at 21:42

## 2020-11-15 RX ADMIN — Medication 1000 MILLIGRAM(S): at 15:34

## 2020-11-15 RX ADMIN — Medication 650 MILLIGRAM(S): at 13:35

## 2020-11-15 RX ADMIN — SODIUM CHLORIDE 1000 MILLILITER(S): 9 INJECTION, SOLUTION INTRAVENOUS at 14:03

## 2020-11-15 RX ADMIN — CEFEPIME 100 MILLIGRAM(S): 1 INJECTION, POWDER, FOR SOLUTION INTRAMUSCULAR; INTRAVENOUS at 18:08

## 2020-11-15 RX ADMIN — ENOXAPARIN SODIUM 40 MILLIGRAM(S): 100 INJECTION SUBCUTANEOUS at 21:42

## 2020-11-15 RX ADMIN — LIDOCAINE 1 APPLICATION(S): 4 CREAM TOPICAL at 14:45

## 2020-11-15 RX ADMIN — SODIUM CHLORIDE 1000 MILLILITER(S): 9 INJECTION, SOLUTION INTRAVENOUS at 15:00

## 2020-11-15 RX ADMIN — Medication 250 MILLIGRAM(S): at 14:27

## 2020-11-15 RX ADMIN — PIPERACILLIN AND TAZOBACTAM 200 GRAM(S): 4; .5 INJECTION, POWDER, LYOPHILIZED, FOR SOLUTION INTRAVENOUS at 14:03

## 2020-11-15 RX ADMIN — Medication 650 MILLIGRAM(S): at 14:27

## 2020-11-15 RX ADMIN — PIPERACILLIN AND TAZOBACTAM 3.38 GRAM(S): 4; .5 INJECTION, POWDER, LYOPHILIZED, FOR SOLUTION INTRAVENOUS at 14:28

## 2020-11-15 NOTE — ED PROVIDER NOTE - OBJECTIVE STATEMENT
60F with pmh breast ca, HLD who presents with 2 days of right chest port erythema and pain.  She notes she is COVID positive 2 weeks ago, remains asymptomatic from that.  Last chemo session 10/20.    specifically denies headache, sore throat, chest pain, shortness of breath, cough, abdominal pain, nausea, vomiting, constipation, diarrhea, back or neck pain, or lower extremity edema.

## 2020-11-15 NOTE — ED PROVIDER NOTE - NS ED ROS FT
In additional the that documented in the HPI, the additional ROS was obtained:    CONSTITUTIONAL: No fever, no chills  EYES: no change in vision, no blurred vision  HEENT: no trouble swallowing, no trouble speaking, no sore throat  NECK: no pain, no stiffness  CV: no chest pain, no palpitations  RESP: no cough, no shortness of breath  GI: no abdominal pain, no nausea, no vomiting, no diarrhea, no constipation, no BRBPR or melena  : No dysuria, no frequency  NEURO: no headache, no new numbness, no weakness, no dizziness  SKIN: no new rashes  HEME: No easy bruising or bleeding  MSK: No joint pain, no recent trauma  Leodan Massey M.D. -Resident

## 2020-11-15 NOTE — ED ADULT NURSE REASSESSMENT NOTE - NS ED NURSE REASSESS COMMENT FT1
report received from break RN. pt comfortable, offers no complaints. Pt assigned bed on 6T, awaiting RN to return call for report.

## 2020-11-15 NOTE — H&P ADULT - NSHPLABSRESULTS_GEN_ALL_CORE
10.6   8.06  )-----------( 391      ( 15 Nov 2020 13:50 )             33.2   11-15    139  |  103  |  14  ----------------------------<  92  4.3   |  24  |  0.74    Ca    9.8      15 Nov 2020 13:50  Phos  3.6     11-15  Mg     1.8     11-15    TPro  7.5  /  Alb  4.4  /  TBili  0.2  /  DBili  x   /  AST  23  /  ALT  28  /  AlkPhos  67  11-15    CXR: Negative for acute pulmonary process.  EKG: NSR @ 89bpm

## 2020-11-15 NOTE — ED PROVIDER NOTE - ATTENDING CONTRIBUTION TO CARE
Attending Statement: I have personally seen and examined this patient. I have fully participated in the care of this patient. I have reviewed all pertinent clinical information, including history physical exam, plan and the Resident's note and agree except as noted  61yo F hx HLD, breast ca on chemo w port, pw two days of chest redness and pain at port. Tender to touch. Last chemo was on 10-20 missed tx due to being covid +, denies any sob/cough/ n/v/ body ache   no abdominal pain. not on abx.  Vital signs noted. sitting up alert nontoxic. no work of breathing no retractions. +port on the right anterior chest wall, red, tender to touch. no discharge no fluctuance. soft nontender abdomen. no  rebound. no guarding. no sign of trauma. no CVAT  plan labs, cxr, blood cx peripheral and from port, pain med, iv abx, tba

## 2020-11-15 NOTE — ED ADULT TRIAGE NOTE - CHIEF COMPLAINT QUOTE
Breast CA pt on chemo via right chest port. was dx with COVID 2 weeks ago, only had runny nose at the time and feels well. states she missed one chemo session. presents today d/t concern for port infection. right chest wall port is red and warm to touch

## 2020-11-15 NOTE — H&P ADULT - PROBLEM SELECTOR PLAN 1
S/P IV Vanco and Zosyn by ED.  -will continue IV Vancomycin 1g Q12h  -monitor Vanco trough levels  -F/u blood cultures

## 2020-11-15 NOTE — H&P ADULT - HISTORY OF PRESENT ILLNESS
60F with pmh Left Breast Ca (last chemo on 10/20/20,) HLD and R Chemo/infuse port presents with 2 days of right chest port erythema and pain. She denies fever, chills, discharge at the site, chest pain, sob, palpitations, diaphoresis, nausea, vomiting or abdominal pain. Pt does reports she was COVID positive 2 weeks ago, remains asymptomatic from that. Last chemo session 10/20.

## 2020-11-15 NOTE — H&P ADULT - NSICDXPASTSURGICALHX_GEN_ALL_CORE_FT
PAST SURGICAL HISTORY:  History of augmentation of both breasts 02/2004    History of obesity Endoscopic balloon procedure;  05/2018

## 2020-11-15 NOTE — H&P ADULT - ATTENDING COMMENTS
60F with breast ca on chemo presented with chemo port site erythema and tenderness x1 day. no signs of collection or discharge. localize erythema over the port access site with tracking on the skin along the catheter. empiric coverage with Vancomycin and Cefepime. Bcx from port send in ED. f/u culture. ID consult in AM.

## 2020-11-15 NOTE — ED PROVIDER NOTE - PHYSICAL EXAMINATION
Vital signs reviewed.  CONSTITUTIONAL: NAD, awake  HEAD: Normocephalic; atraumatic  EYES: EOMI, no nystagmus, no conjunctival injection, no scleral icterus  MOUTH/THROAT:  MMM  NECK: Trachea midline, no JVD  CV: Normal S1, S2; no audible murmurs; extremities WWP  RESP: normal work of breathing; CTAB, no stridor  ABD: soft, non-distended; non-tender  : Deferred  MSK/EXT: no edema, normal ROM in all four extremities, right chest wall port erythematous and painful.  SKIN: No gross rashes or lesions on exposed skin, no significant trophic changes  NEURO: aaox3, moving all extremities purposefully and spontaneously

## 2020-11-15 NOTE — H&P ADULT - NSHPSOCIALHISTORY_GEN_ALL_CORE
Pt , lives with spouse. Denies currently smoking, drinking or drugs. Former smoker 1/2ppd x 25yrs, quit 14yrs ago. Occasionally drinks a glass of wine, answered NO to all CAGE questions.

## 2020-11-15 NOTE — H&P ADULT - ASSESSMENT
60F with pmh breast ca, HLD who presents with 2 days of right chest port erythema and pain, admitted for Cellulitis around [facility].

## 2020-11-15 NOTE — ED CLERICAL - NS ED CLERK NOTE PRE-ARRIVAL INFORMATION; ADDITIONAL PRE-ARRIVAL INFORMATION
Pt being sent in to R/O indwelling mediport infection pt is COVID positive as of 11/13 pt on chemotherapy for breast cancer.

## 2020-11-15 NOTE — H&P ADULT - PROBLEM SELECTOR PLAN 2
Consider Oncology consult, since pt technically supposed to restart her chemo therapy on 11/18 (Wednesday)

## 2020-11-15 NOTE — H&P ADULT - RS GEN PE MLT RESP DETAILS PC
clear to auscultation bilaterally/respirations non-labored/no rales/airway patent/good air movement/breath sounds equal

## 2020-11-15 NOTE — ED ADULT NURSE NOTE - OBJECTIVE STATEMENT
pt received to room7, a&ox 4, ambulatory, pmh of HLD and breast ca diagnoses august 2020 on chemo last session 10/20, p/w redness and tenderness of chemo port since yesterday pt suspects infection of site. Pt covid positive for 2weeks. Pt breathing even and unlabored on room air. NSR on cardiac monitor. Denies fever, chills, cough, SOB, chest pain, palpitations, dizziness, N/V/D, constipation, numbness, tingling. pending lab work and imaging.

## 2020-11-15 NOTE — ED ADULT NURSE NOTE - NS ED NURSE LEVEL OF CONSCIOUSNESS ORIENTATION
Pt comes in today complaining of vomiting and a headache in the back of her head since 0430 today. Pt has a Hx of this. Pt has an aneurism on the right posterior side that will be surgically repaired on Sept 16. From her last appt. They think that there might have been some bleeding from it and they scheduled the surgery. Pt is resting comfortably currently.   
Oriented - self; Oriented - place; Oriented - time

## 2020-11-16 ENCOUNTER — TRANSCRIPTION ENCOUNTER (OUTPATIENT)
Age: 60
End: 2020-11-16

## 2020-11-16 DIAGNOSIS — U07.1 COVID-19: ICD-10-CM

## 2020-11-16 LAB
ANION GAP SERPL CALC-SCNC: 11 MMO/L — SIGNIFICANT CHANGE UP (ref 7–14)
BUN SERPL-MCNC: 11 MG/DL — SIGNIFICANT CHANGE UP (ref 7–23)
CALCIUM SERPL-MCNC: 9.5 MG/DL — SIGNIFICANT CHANGE UP (ref 8.4–10.5)
CHLORIDE SERPL-SCNC: 102 MMOL/L — SIGNIFICANT CHANGE UP (ref 98–107)
CO2 SERPL-SCNC: 24 MMOL/L — SIGNIFICANT CHANGE UP (ref 22–31)
CREAT SERPL-MCNC: 0.77 MG/DL — SIGNIFICANT CHANGE UP (ref 0.5–1.3)
GLUCOSE SERPL-MCNC: 107 MG/DL — HIGH (ref 70–99)
HCT VFR BLD CALC: 27.6 % — LOW (ref 34.5–45)
HCV AB S/CO SERPL IA: 0.2 S/CO — SIGNIFICANT CHANGE UP (ref 0–0.99)
HCV AB SERPL-IMP: SIGNIFICANT CHANGE UP
HGB BLD-MCNC: 8.9 G/DL — LOW (ref 11.5–15.5)
MCHC RBC-ENTMCNC: 31.2 PG — SIGNIFICANT CHANGE UP (ref 27–34)
MCHC RBC-ENTMCNC: 32.2 % — SIGNIFICANT CHANGE UP (ref 32–36)
MCV RBC AUTO: 96.8 FL — SIGNIFICANT CHANGE UP (ref 80–100)
NRBC # FLD: 0 K/UL — SIGNIFICANT CHANGE UP (ref 0–0)
PLATELET # BLD AUTO: 355 K/UL — SIGNIFICANT CHANGE UP (ref 150–400)
PMV BLD: 9.5 FL — SIGNIFICANT CHANGE UP (ref 7–13)
POTASSIUM SERPL-MCNC: 3.7 MMOL/L — SIGNIFICANT CHANGE UP (ref 3.5–5.3)
POTASSIUM SERPL-SCNC: 3.7 MMOL/L — SIGNIFICANT CHANGE UP (ref 3.5–5.3)
RBC # BLD: 2.85 M/UL — LOW (ref 3.8–5.2)
RBC # FLD: 14.9 % — HIGH (ref 10.3–14.5)
SODIUM SERPL-SCNC: 137 MMOL/L — SIGNIFICANT CHANGE UP (ref 135–145)
WBC # BLD: 7.28 K/UL — SIGNIFICANT CHANGE UP (ref 3.8–10.5)
WBC # FLD AUTO: 7.28 K/UL — SIGNIFICANT CHANGE UP (ref 3.8–10.5)

## 2020-11-16 PROCEDURE — 99254 IP/OBS CNSLTJ NEW/EST MOD 60: CPT

## 2020-11-16 PROCEDURE — 99233 SBSQ HOSP IP/OBS HIGH 50: CPT

## 2020-11-16 RX ADMIN — SIMVASTATIN 20 MILLIGRAM(S): 20 TABLET, FILM COATED ORAL at 21:19

## 2020-11-16 RX ADMIN — CEFEPIME 100 MILLIGRAM(S): 1 INJECTION, POWDER, FOR SOLUTION INTRAMUSCULAR; INTRAVENOUS at 02:01

## 2020-11-16 RX ADMIN — Medication 250 MILLIGRAM(S): at 02:01

## 2020-11-16 RX ADMIN — CEFEPIME 100 MILLIGRAM(S): 1 INJECTION, POWDER, FOR SOLUTION INTRAMUSCULAR; INTRAVENOUS at 10:23

## 2020-11-16 RX ADMIN — Medication 250 MILLIGRAM(S): at 14:10

## 2020-11-16 RX ADMIN — PANTOPRAZOLE SODIUM 40 MILLIGRAM(S): 20 TABLET, DELAYED RELEASE ORAL at 10:23

## 2020-11-16 RX ADMIN — ENOXAPARIN SODIUM 40 MILLIGRAM(S): 100 INJECTION SUBCUTANEOUS at 21:18

## 2020-11-16 NOTE — PROGRESS NOTE ADULT - SUBJECTIVE AND OBJECTIVE BOX
[facility] Division of Hospital Medicine  Elisa Sanchez MD  Pager: [phone]      Patient is a 60y old  Female who presents with a chief complaint of infection of chemo port (16 Nov 2020 11:57)      SUBJECTIVE / OVERNIGHT EVENTS: patient states that she is upset about the current circumstances. She endorses some pain/swelling over R chest wall site. Denies fevers, cough, SOB.    MEDICATIONS  (STANDING):  cefepime   IVPB      cefepime   IVPB 2000 milliGRAM(s) IV Intermittent every 8 hours  enoxaparin Injectable 40 milliGRAM(s) SubCutaneous every 24 hours  pantoprazole    Tablet 40 milliGRAM(s) Oral before breakfast  simvastatin 20 milliGRAM(s) Oral at bedtime  vancomycin  IVPB 1000 milliGRAM(s) IV Intermittent every 12 hours    MEDICATIONS  (PRN):  acetaminophen   Tablet .. 650 milliGRAM(s) Oral every 6 hours PRN Temp greater or equal to 38C (100.4F), Mild Pain (1 - 3), Moderate Pain (4 - 6)      CAPILLARY BLOOD GLUCOSE        I&O's Summary    15 Nov 2020 07:01  -  16 Nov 2020 07:00  --------------------------------------------------------  IN: 300 mL / OUT: 0 mL / NET: 300 mL    16 Nov 2020 07:01  -  16 Nov 2020 13:37  --------------------------------------------------------  IN: 250 mL / OUT: 0 mL / NET: 250 mL        PHYSICAL EXAM:  Vital Signs Last 24 Hrs  T(C): 36.9 (16 Nov 2020 09:51), Max: 37.8 (15 Nov 2020 21:19)  T(F): 98.5 (16 Nov 2020 09:51), Max: 100 (15 Nov 2020 21:19)  HR: 99 (16 Nov 2020 09:51) (78 - 114)  BP: 126/76 (16 Nov 2020 09:51) (117/69 - 134/77)  BP(mean): --  RR: 18 (16 Nov 2020 09:51) (18 - 20)  SpO2: 95% (16 Nov 2020 09:51) (95% - 100%)    CONSTITUTIONAL: NAD, well-developed, well-groomed  RESPIRATORY: Normal respiratory effort; lungs are clear to auscultation bilaterally  CARDIOVASCULAR:  S1/S2; No lower extremity edema  CHEST WALL: R chest wall site w/ erythema, warmth over mediport site   ABDOMEN: Nontender to palpation, normoactive bowel sounds, no rebound/guarding  MUSCULOSKELETAL:  no clubbing or cyanosis of digits; no joint swelling or tenderness to palpation  PSYCH: A+O to person, place, and time; affect appropriate  NEUROLOGY: no focal deficits  SKIN: No rashes; no palpable lesions    LABS:                        8.9    7.28  )-----------( 355      ( 16 Nov 2020 07:11 )             27.6     11-16    137  |  102  |  11  ----------------------------<  107<H>  3.7   |  24  |  0.77    Ca    9.5      16 Nov 2020 07:11  Phos  3.6     11-15  Mg     1.8     11-15    TPro  7.5  /  Alb  4.4  /  TBili  0.2  /  DBili  x   /  AST  23  /  ALT  28  /  AlkPhos  67  11-15                RADIOLOGY & ADDITIONAL TESTS:  Results Reviewed: X  Imaging Personally Reviewed:  Electrocardiogram Personally Reviewed:    COORDINATION OF CARE:  Care Discussed with Consultants/Other Providers [Y/N]:  Prior or Outpatient Records Reviewed [Y/N]:   [facility] Division of Hospital Medicine  Elisa Sanchez MD  Pager: [phone]      Patient is a 60y old  Female who presents with a chief complaint of infection of chemo port (16 Nov 2020 11:57)      SUBJECTIVE / OVERNIGHT EVENTS: patient states that she is upset about the current circumstances. She endorses some pain/swelling over R chest wall site. Denies fevers, cough, SOB.    MEDICATIONS  (STANDING):  cefepime   IVPB      cefepime   IVPB 2000 milliGRAM(s) IV Intermittent every 8 hours  enoxaparin Injectable 40 milliGRAM(s) SubCutaneous every 24 hours  pantoprazole    Tablet 40 milliGRAM(s) Oral before breakfast  simvastatin 20 milliGRAM(s) Oral at bedtime  vancomycin  IVPB 1000 milliGRAM(s) IV Intermittent every 12 hours    MEDICATIONS  (PRN):  acetaminophen   Tablet .. 650 milliGRAM(s) Oral every 6 hours PRN Temp greater or equal to 38C (100.4F), Mild Pain (1 - 3), Moderate Pain (4 - 6)      CAPILLARY BLOOD GLUCOSE        I&O's Summary    15 Nov 2020 07:01  -  16 Nov 2020 07:00  --------------------------------------------------------  IN: 300 mL / OUT: 0 mL / NET: 300 mL    16 Nov 2020 07:01  -  16 Nov 2020 13:37  --------------------------------------------------------  IN: 250 mL / OUT: 0 mL / NET: 250 mL        PHYSICAL EXAM:  Vital Signs Last 24 Hrs  T(C): 36.9 (16 Nov 2020 09:51), Max: 37.8 (15 Nov 2020 21:19)  T(F): 98.5 (16 Nov 2020 09:51), Max: 100 (15 Nov 2020 21:19)  HR: 99 (16 Nov 2020 09:51) (78 - 114)  BP: 126/76 (16 Nov 2020 09:51) (117/69 - 134/77)  BP(mean): --  RR: 18 (16 Nov 2020 09:51) (18 - 20)  SpO2: 95% (16 Nov 2020 09:51) (95% - 100%)    CONSTITUTIONAL: NAD, well-developed, well-groomed  RESPIRATORY: Normal respiratory effort; lungs are clear to auscultation bilaterally  CARDIOVASCULAR:  S1/S2; No lower extremity edema  CHEST WALL: R chest wall site w/ erythema, warmth over mediport site   ABDOMEN: Nontender to palpation, normoactive bowel sounds, no rebound/guarding  MUSCULOSKELETAL:  no clubbing or cyanosis of digits; no joint swelling or tenderness to palpation  PSYCH: A+O to person, place, and time; affect appropriate  NEUROLOGY: no focal deficits  SKIN: No rashes; no palpable lesions    LABS:                        8.9    7.28  )-----------( 355      ( 16 Nov 2020 07:11 )             27.6     11-16    137  |  102  |  11  ----------------------------<  107<H>  3.7   |  24  |  0.77    Ca    9.5      16 Nov 2020 07:11  Phos  3.6     11-15  Mg     1.8     11-15    TPro  7.5  /  Alb  4.4  /  TBili  0.2  /  DBili  x   /  AST  23  /  ALT  28  /  AlkPhos  67  11-15                RADIOLOGY & ADDITIONAL TESTS:  Results Reviewed: X  Imaging Personally Reviewed:  Electrocardiogram Personally Reviewed:    COORDINATION OF CARE:  Care Discussed with Consultants/Other Providers [Y/N]: d/w ID Dr. Milian, d/c Cefepime and c/w Vanco for now   Prior or Outpatient Records Reviewed [Y/N]:

## 2020-11-16 NOTE — DISCHARGE NOTE PROVIDER - NSFOLLOWUPCLINICS_GEN_ALL_ED_FT
[facility] ENT  ENT  [address]  [place] [address]  Phone: [phone]  Fax:   Follow Up Time:

## 2020-11-16 NOTE — DISCHARGE NOTE PROVIDER - NSDCCPCAREPLAN_GEN_ALL_CORE_FT
PRINCIPAL DISCHARGE DIAGNOSIS  Diagnosis: Cellulitis of skin  Assessment and Plan of Treatment: You were started on antibiotics for a skin infection over your port. You were seen by the infectious disease team and there was no need to remove your port at this time. Continue your antibiotics as directed and follow up with your primary care provider for further management recommendations.      SECONDARY DISCHARGE DIAGNOSES  Diagnosis: Breast cancer, left  Assessment and Plan of Treatment: Continue your chemotherapy with oncology.    Diagnosis: 2019 novel coronavirus disease (COVID-19)  Assessment and Plan of Treatment: You were noted to be positive for COVID    Diagnosis: Hearing loss in right ear  Assessment and Plan of Treatment: Follow up with ENT clinic for further evaluation and testing. Please call, [phone], to arrange for an audiogram and office visit.     PRINCIPAL DISCHARGE DIAGNOSIS  Diagnosis: Cellulitis of skin  Assessment and Plan of Treatment: You were started on antibiotics for a skin infection over your port. You were seen by the infectious disease team and there was no need to remove your port at this time. Continue your antibiotics as directed and follow up with your primary care provider for further management recommendations.      SECONDARY DISCHARGE DIAGNOSES  Diagnosis: Breast cancer, left  Assessment and Plan of Treatment: Continue your chemotherapy with oncology.    Diagnosis: 2019 novel coronavirus disease (COVID-19)  Assessment and Plan of Treatment: You were noted to be positive for COVID. However, your remained asymptomatic during your admission and did not require treatment.  Call your primary care provider within 1 - 2 days for further management recommendations and to schedule a follow up appointment. If you do not have one, you can call [phone] to establish care with our clinics.     Should you require more information, please call our coronavirus specialists at [phone] ([phone]).    Diagnosis: Hearing loss in right ear  Assessment and Plan of Treatment: Follow up with ENT clinic for further evaluation and testing. Please call, [phone], to arrange for an audiogram and office visit.     PRINCIPAL DISCHARGE DIAGNOSIS  Diagnosis: Cellulitis of skin  Assessment and Plan of Treatment: You were started on antibiotics for a skin infection over your port. You were seen by the infectious disease team and there was no need to remove your port at this time. Continue your antibiotics as directed and follow up with your primary care provider and oncologist for further management recommendations before using the port.      SECONDARY DISCHARGE DIAGNOSES  Diagnosis: Breast cancer, left  Assessment and Plan of Treatment: Continue your chemotherapy with oncology.    Diagnosis: 2019 novel coronavirus disease (COVID-19)  Assessment and Plan of Treatment: You were noted to be positive for COVID. However, your remained asymptomatic during your admission and did not require treatment.  Call your primary care provider within 1 - 2 days for further management recommendations and to schedule a follow up appointment. If you do not have one, you can call [phone] to establish care with our clinics.     Should you require more information, please call our coronavirus specialists at [phone] ([phone]).    Diagnosis: Hearing loss in right ear  Assessment and Plan of Treatment: Follow up with ENT clinic for further evaluation and testing. Please call, [phone], to arrange for an audiogram and office visit.

## 2020-11-16 NOTE — DISCHARGE NOTE PROVIDER - CARE PROVIDER_API CALL
Cassie Bell  HEMATOLOGY  [address] [place] [address]  Phone: [phone]  Fax: [phone]  Established Patient  Follow Up Time: 1-3 days    [place] [address]  Phone: [phone]  Fax: [phone]  Established Patient  Follow Up Time: 1 week

## 2020-11-16 NOTE — DISCHARGE NOTE PROVIDER - HOSPITAL COURSE
60F with pmh breast ca, HLD who presents with 2 days of right chest port erythema and pain, admitted for Cellulitis around [facility].    Cellulitis of skin.   - Pt is S/P IV Vanco and Zosyn by ED.  -Started on IV Vancomycin 1g Q12h and cefepime  -monitor Vanco trough levels  -Blood cultures---  - ID consulted    Breast cancer, left.   - Next chemotherapy on 11/18 (Wednesday).   -Oncology consulted     Dyslipidemia.   -continue simvastatin.     Covid positive, asymptomatic   60F with pmh breast ca, HLD who presented with 2 days of right chest port erythema and pain, admitted for Cellulitis around [facility] and incidentally found to be COVID (+) after recent infection in 10/2020 and did not require intervention during her admission. Patient treated with Vancomycin, BCx remain NTD. ID consulted and patient transitioned to Keflex for discharge without need for port removal. Heme/Onc consulted, next chemo due 11/18. On ___ this case was reviewed with  ____, the patient is medically stable and optimized for discharge. All medications were reviewed and prescriptions were sent to mutually agreed upon pharmacy. 60F with pmh breast ca, HLD who presented with 2 days of right chest port erythema and pain, admitted for Cellulitis around [facility] and incidentally found to be COVID (+) after recent infection in 10/2020 and did not require intervention during her admission. Patient treated with Vancomycin, BCx remain NTD. ID consulted and patient transitioned to Zyvox for discharge without need for port removal. Heme/Onc consulted, next chemo due 11/18 but will likely be HELD until negative COVID swab. On ___ this case was reviewed with  ____, the patient is medically stable and optimized for discharge. All medications were reviewed and prescriptions were sent to mutually agreed upon pharmacy. 60F with pmh breast ca, HLD who presented with 2 days of right chest port erythema and pain, admitted for Cellulitis around [facility] and incidentally found to be COVID (+) after recent infection in 10/2020 and did not require intervention during her admission. Patient treated with Vancomycin, BCx remain NTD. ID consulted and patient transitioned to Zyvox for discharge without need for port removal. Heme/Onc consulted, next chemo due 11/18 but will likely be HELD until negative COVID swab. On 11/18 this case was reviewed with Dr. Sanchez, the patient is medically stable and optimized for discharge. All medications were reviewed and prescriptions were sent to mutually agreed upon pharmacy. 60F with pmh breast ca, HLD who presented with 2 days of right chest port erythema and pain, admitted for Cellulitis around [facility] and incidentally found to be COVID (+). Patient treated with Vancomycin IV for cellulitis, Blood cultures with no growth. ID consulted and patient transitioned to Zyvox for discharge without need for port removal. Heme/Onc consulted, next chemo due 11/18 but will likely be HELD until negative COVID swab (patient can get retested on 11/19). Patient asymptomatic from COVID standpoint, not requiring O2 or treatment. On 11/18 this case was reviewed with Dr. Sanchez, the patient is medically stable and optimized for discharge. All medications were reviewed and prescriptions were sent to mutually agreed upon pharmacy.

## 2020-11-16 NOTE — PROGRESS NOTE ADULT - PROBLEM SELECTOR PLAN 3
Patient follows with Dr. Cassie Bell at [facility]   Last chemo was deferred due to COVID-19 diagnosis  Will inform oncology of patient admission

## 2020-11-16 NOTE — PROGRESS NOTE ADULT - PROBLEM SELECTOR PLAN 1
Over R chest wall port site   - S/P IV Vanco and Zosyn by ED.  - Will continue IV Vancomycin and Cefepime   - F/u blood cultures  - ID c/s  - Will need to discuss whether mediport needs to be removed Over R chest wall port site   - S/P IV Vanco and Zosyn by ED.  - Will continue IV Vancomycin  - F/u blood cultures  - ID c/s appreciated   - Will need to discuss whether mediport needs removed, pending blood cultures

## 2020-11-16 NOTE — DISCHARGE NOTE PROVIDER - PROVIDER TOKENS
PROVIDER:[TOKEN:[[id number]],FOLLOWUP:[1-3 days],ESTABLISHEDPATIENT:[T]],PROVIDER:[TOKEN:[[id number]],FOLLOWUP:[1 week],ESTABLISHEDPATIENT:[T]]

## 2020-11-16 NOTE — DISCHARGE NOTE PROVIDER - CARE PROVIDERS DIRECT ADDRESSES
[email or web address]

## 2020-11-16 NOTE — PROGRESS NOTE ADULT - PROBLEM SELECTOR PLAN 2
COVID-19+ as outpatient on 10/30  - Patient had fevers, diarrhea initially, now resolved  - No respiratory symptoms  - Saturating well on RA, no indication for decadron or Remdesivir  - Continue to monitor O2 sat

## 2020-11-16 NOTE — DISCHARGE NOTE PROVIDER - NSDCMRMEDTOKEN_GEN_ALL_CORE_FT
omeprazole 20 mg oral delayed release capsule: orally once a day  simvastatin 20 mg oral tablet: 1 tab(s) orally once a day (at bedtime)  Tylenol 325 mg oral tablet: 2 tab(s) orally every 4 hours, As Needed   linezolid 600 mg oral tablet: 1 tab(s) orally 2 times a day   omeprazole 20 mg oral delayed release capsule: orally once a day  simvastatin 20 mg oral tablet: 1 tab(s) orally once a day (at bedtime)  Tylenol 325 mg oral tablet: 2 tab(s) orally every 4 hours, As Needed

## 2020-11-16 NOTE — PROGRESS NOTE ADULT - ASSESSMENT
60F with hx of breast CA on chemotherapy, HLD who presents with 2 days of right chest port erythema and pain, admitted for Cellulitis around [facility].

## 2020-11-16 NOTE — CONSULT NOTE ADULT - SUBJECTIVE AND OBJECTIVE BOX
Patient is a 60y old  Female who presents with a chief complaint of infection of chemo port (16 Nov 2020 13:36)      HPI:  60F with pmh Left Breast Ca (last chemo on 10/20/20,) HLD and R Chemo/infuse port presents with 2 days of right chest port erythema and pain. She denies fever, chills, discharge at the site, chest pain, sob, palpitations, diaphoresis, nausea, vomiting or abdominal pain. Pt does reports she was COVID positive 2 weeks ago, remains asymptomatic from that. Last chemo session 10/20.  Above reviewed;   Pt feels the area is less sore today. Port has not been accessed for more than 2 weeks. No boils or pimple or cut ir scrape in the area. No fever or chills at home.       PAST MEDICAL & SURGICAL HISTORY:  Breast cancer, left  Obesity  Dyslipidemia  History of obesity  Endoscopic balloon procedure;  05/2018  History of augmentation of both breasts  02/2004      REVIEW OF SYSTEMS    General: Denies any chills. Low grade Fever    Skin: No rash, rest per HPI   	  Ophthalmologic: Denies any discharge, redness.  	  ENT: No nasal congestion or throat pain.     Respiratory and Thorax: No cough, sputum. Denies shortness of breath.  	  Cardiovascular: No chest pain,    Gastrointestinal: No nausea, abdominal pain or diarrhea.    Genitourinary: No dysuria, frequency. No flank pain.    Musculoskeletal: No joint swelling    Neurological: No extremity weakness.    Psychiatric: No hallucinations	    Extremities: No swelling     Endocrine: No polyuria or polydipsia.     Allergic/Immunologic: No hives       Social history:  Lives with boyfriend, former smoker.       FAMILY HISTORY:  Mother: Lung cancer  Father: Stomach cancer.       Allergies  No Known Allergies      Antimicrobials:  vancomycin  IVPB 1000 milliGRAM(s) IV Intermittent every 12 hours        Vital Signs Last 24 Hrs  T(C): 36.9 (16 Nov 2020 09:51), Max: 37.8 (15 Nov 2020 21:19)  T(F): 98.5 (16 Nov 2020 09:51), Max: 100 (15 Nov 2020 21:19)  HR: 99 (16 Nov 2020 09:51) (78 - 114)  BP: 126/76 (16 Nov 2020 09:51) (117/69 - 134/77)  BP(mean): --  RR: 18 (16 Nov 2020 09:51) (18 - 20)  SpO2: 95% (16 Nov 2020 09:51) (95% - 100%)        PHYSICAL EXAM: Patient in no acute distress.    Constitutional: Comfortable. Awake and alert    Eyes: No discharge or conjunctival injection    ENT: No thrush. No pharyngeal exudate or erythema.    Neck: Supple, rt chest port with erythema, warmth, tenderness of the surrounding skin.     Respiratory: + air entry bilaterally.    Cardiovascular: S1 S2 wnl, No murmurs.    Gastrointestinal: Soft BS(+) no tenderness, non distended.    Genitourinary: No CVA tenderness     Extremities: No edema.    Vascular: peripheral pulses felt    Neurological: AAO X 3. No grossly focal deficits.    Skin: No rash     Musculoskeletal: No joint swelling.    Psychiatric: Affect normal.                              8.9    7.28  )-----------( 355      ( 16 Nov 2020 07:11 )             27.6       11-16    137  |  102  |  11  ----------------------------<  107<H>  3.7   |  24  |  0.77    Ca    9.5      16 Nov 2020 07:11  Phos  3.6     11-15  Mg     1.8     11-15    TPro  7.5  /  Alb  4.4  /  TBili  0.2  /  DBili  x   /  AST  23  /  ALT  28  /  AlkPhos  67  11-15      COVID-19 PCR . (11.15.20 @ 14:29)   COVID-19 PCR: Detected        Radiology: Imaging reviewed and visualized personally [ x]    < from: Xray Chest 1 View- PORTABLE-Urgent (Xray Chest 1 View- PORTABLE-Urgent .) (11.15.20 @ 14:29) >  IMPRESSION:  Negative for acute pulmonary process.

## 2020-11-16 NOTE — DISCHARGE NOTE PROVIDER - NSDCFUSCHEDAPPT_GEN_ALL_CORE_FT
KELSY CRANE ; 12/02/2020 ; [facility]  KELSY CRANE ; 12/09/2020 ; [facility]  KELSY CRANE ; 12/09/2020 ; [facility]  KELSY CRANE ; 12/23/2020 ; [facility]  KELSY CRANE ; 12/30/2020 ; \[facility]\"" [facility]  KELSY CRANE ; 12/30/2020 ; [facility]

## 2020-11-16 NOTE — CONSULT NOTE ADULT - ASSESSMENT
60F with pmh Left Breast Ca (last chemo on 10/20/20,) HLD and R Chemo/infuse port presents with 2 days of right chest port erythema and pain.     low grade temp, no leucocytosis,   concern for local port site infection.   + COVID-19 infection, not symptomatic anymore.       Plan:   pt with not much abx exposure in past, not diabetic, at low risk for Gm negative infection.   Stopped cefepime  c/w vancomycin at current dose  monitor vancomycin trough and creatinine to avoid nephrotoxicity and ensure efficacy   follow up blood cx  MRSA PCR   trend CBC

## 2020-11-16 NOTE — CHART NOTE - NSCHARTNOTEFT_GEN_A_CORE
Patient with breast caner, on neoadjuvant chemotherapy via port, last 10/20, presenting with port infection.   Also has persistent COVID.   ID is following, appreciate input.   Would appreciate ID input on how long to hold chemotherapy for given persistent covid positivity, after acute infection has resolved.     Will follow. Please do not hesitate to call back with questions.     Taya Christiansen MD  Medical Oncology Attending  C: [phone]

## 2020-11-17 DIAGNOSIS — H91.91 UNSPECIFIED HEARING LOSS, RIGHT EAR: ICD-10-CM

## 2020-11-17 LAB
ANION GAP SERPL CALC-SCNC: 9 MMO/L — SIGNIFICANT CHANGE UP (ref 7–14)
BUN SERPL-MCNC: 10 MG/DL — SIGNIFICANT CHANGE UP (ref 7–23)
CALCIUM SERPL-MCNC: 9.8 MG/DL — SIGNIFICANT CHANGE UP (ref 8.4–10.5)
CHLORIDE SERPL-SCNC: 105 MMOL/L — SIGNIFICANT CHANGE UP (ref 98–107)
CO2 SERPL-SCNC: 27 MMOL/L — SIGNIFICANT CHANGE UP (ref 22–31)
CREAT SERPL-MCNC: 0.65 MG/DL — SIGNIFICANT CHANGE UP (ref 0.5–1.3)
GLUCOSE SERPL-MCNC: 97 MG/DL — SIGNIFICANT CHANGE UP (ref 70–99)
HCT VFR BLD CALC: 31.5 % — LOW (ref 34.5–45)
HGB BLD-MCNC: 9.9 G/DL — LOW (ref 11.5–15.5)
MAGNESIUM SERPL-MCNC: 1.9 MG/DL — SIGNIFICANT CHANGE UP (ref 1.6–2.6)
MCHC RBC-ENTMCNC: 30.7 PG — SIGNIFICANT CHANGE UP (ref 27–34)
MCHC RBC-ENTMCNC: 31.4 % — LOW (ref 32–36)
MCV RBC AUTO: 97.8 FL — SIGNIFICANT CHANGE UP (ref 80–100)
NRBC # FLD: 0 K/UL — SIGNIFICANT CHANGE UP (ref 0–0)
PLATELET # BLD AUTO: 370 K/UL — SIGNIFICANT CHANGE UP (ref 150–400)
PMV BLD: 9.1 FL — SIGNIFICANT CHANGE UP (ref 7–13)
POTASSIUM SERPL-MCNC: 3.8 MMOL/L — SIGNIFICANT CHANGE UP (ref 3.5–5.3)
POTASSIUM SERPL-SCNC: 3.8 MMOL/L — SIGNIFICANT CHANGE UP (ref 3.5–5.3)
RBC # BLD: 3.22 M/UL — LOW (ref 3.8–5.2)
RBC # FLD: 14.9 % — HIGH (ref 10.3–14.5)
SODIUM SERPL-SCNC: 141 MMOL/L — SIGNIFICANT CHANGE UP (ref 135–145)
VANCOMYCIN TROUGH SERPL-MCNC: 12.3 UG/ML — SIGNIFICANT CHANGE UP (ref 10–20)
WBC # BLD: 5.74 K/UL — SIGNIFICANT CHANGE UP (ref 3.8–10.5)
WBC # FLD AUTO: 5.74 K/UL — SIGNIFICANT CHANGE UP (ref 3.8–10.5)

## 2020-11-17 PROCEDURE — 99232 SBSQ HOSP IP/OBS MODERATE 35: CPT

## 2020-11-17 PROCEDURE — 99233 SBSQ HOSP IP/OBS HIGH 50: CPT

## 2020-11-17 RX ADMIN — SIMVASTATIN 20 MILLIGRAM(S): 20 TABLET, FILM COATED ORAL at 21:38

## 2020-11-17 RX ADMIN — Medication 250 MILLIGRAM(S): at 02:45

## 2020-11-17 RX ADMIN — ENOXAPARIN SODIUM 40 MILLIGRAM(S): 100 INJECTION SUBCUTANEOUS at 21:38

## 2020-11-17 RX ADMIN — Medication 250 MILLIGRAM(S): at 14:16

## 2020-11-17 RX ADMIN — PANTOPRAZOLE SODIUM 40 MILLIGRAM(S): 20 TABLET, DELAYED RELEASE ORAL at 09:34

## 2020-11-17 NOTE — PROGRESS NOTE ADULT - ASSESSMENT
60F with hx of breast CA on chemotherapy, HLD who presents with 2 days of right chest port erythema and pain, admitted for Cellulitis around [facility].

## 2020-11-17 NOTE — PROGRESS NOTE ADULT - ASSESSMENT
60F with pmh Left Breast Ca (last chemo on 10/20/20,) HLD and R Chemo/infuse port presents with 2 days of right chest port erythema and pain.     low grade temp, no leucocytosis,   concern for local port site infection.   + COVID-19 infection, not symptomatic anymore.       Plan:   c/w vancomycin at current dose  monitor vancomycin trough and creatinine to avoid nephrotoxicity and ensure efficacy   follow up blood cx, NTD   nose cx performed.   trend CBC   can switch to po abx in next 24-48 hrs based on improvement.   repeat COVID test can be performed Thursday.

## 2020-11-17 NOTE — PROGRESS NOTE ADULT - PROBLEM SELECTOR PLAN 2
COVID-19+ as outpatient on 10/30  - Patient had fevers, diarrhea initially, now resolved  - No respiratory symptoms  - Saturating well on RA, no indication for decadron or Remdesivir  - Continue to monitor O2 sat  - Per ID, patient can be reswabbed for COVID on 11/19

## 2020-11-17 NOTE — CHART NOTE - NSCHARTNOTEFT_GEN_A_CORE
Medicine called for ENT consult for unilateral hearing loss per outpatient oncologist Dr. Bell.     Spoke to Oncologist Dr. Bain Attarian on the phone regarding the consult. Given the hearing loss is not acute and not sudden onset, will need audiogram for further evaluation. Patient can follow up with ENT outpatient once discharged from hospital for audiogram and ENT appointment. Please call [phone] for both appointment.

## 2020-11-17 NOTE — PROGRESS NOTE ADULT - SUBJECTIVE AND OBJECTIVE BOX
[facility] Division of Hospital Medicine  Elisa Sanchez MD  Pager: [phone]      Patient is a 60y old  Female who presents with a chief complaint of infection of chemo port (16 Nov 2020 13:58)      SUBJECTIVE / OVERNIGHT EVENTS: patient states that she feels well, she denies SOB, fevers. States port site is looking better, less red and swollen. Endorsing R sided hearing loss x several weeks, is concerned it is related to chemotherapy. Cannot hear if she closes off her L ear. She says this happens when she has a cold but usually goes away, this time did not.     MEDICATIONS  (STANDING):  enoxaparin Injectable 40 milliGRAM(s) SubCutaneous every 24 hours  pantoprazole    Tablet 40 milliGRAM(s) Oral before breakfast  simvastatin 20 milliGRAM(s) Oral at bedtime  vancomycin  IVPB 1000 milliGRAM(s) IV Intermittent every 12 hours    MEDICATIONS  (PRN):  acetaminophen   Tablet .. 650 milliGRAM(s) Oral every 6 hours PRN Temp greater or equal to 38C (100.4F), Mild Pain (1 - 3), Moderate Pain (4 - 6)      CAPILLARY BLOOD GLUCOSE        I&O's Summary    16 Nov 2020 07:01  -  17 Nov 2020 07:00  --------------------------------------------------------  IN: 600 mL / OUT: 0 mL / NET: 600 mL        PHYSICAL EXAM:  Vital Signs Last 24 Hrs  T(C): 37.4 (17 Nov 2020 09:00), Max: 37.4 (17 Nov 2020 09:00)  T(F): 99.3 (17 Nov 2020 09:00), Max: 99.3 (17 Nov 2020 09:00)  HR: 95 (17 Nov 2020 09:00) (86 - 96)  BP: 115/74 (17 Nov 2020 09:00) (110/64 - 121/69)  BP(mean): --  RR: 18 (17 Nov 2020 09:00) (18 - 18)  SpO2: 96% (17 Nov 2020 09:00) (96% - 100%)    CONSTITUTIONAL: NAD, well-developed, well-groomed  RESPIRATORY: Normal respiratory effort; lungs are clear to auscultation bilaterally  CARDIOVASCULAR:  S1/S2; No lower extremity edema  CHEST WALL: R chest wall site w/ erythema, warmth over mediport site - improved   ABDOMEN: Nontender to palpation, normoactive bowel sounds, no rebound/guarding  PSYCH: A+O to person, place, and time; affect appropriate  NEUROLOGY: no focal deficits      LABS:                        9.9    5.74  )-----------( 370      ( 17 Nov 2020 11:49 )             31.5     11-17    141  |  105  |  10  ----------------------------<  97  3.8   |  27  |  0.65    Ca    9.8      17 Nov 2020 11:49  Mg     1.9     11-17                Culture - Blood (collected 15 Nov 2020 21:28)  Source: .Blood Port Device  Preliminary Report (16 Nov 2020 22:01):    No growth to date.    Culture - Blood (collected 15 Nov 2020 17:10)  Source: .Blood Blood-Peripheral  Preliminary Report (16 Nov 2020 18:02):    No growth to date.    Culture - Blood (collected 15 Nov 2020 17:10)  Source: .Blood Blood-Peripheral  Preliminary Report (16 Nov 2020 18:02):    No growth to date.        RADIOLOGY & ADDITIONAL TESTS:  Results Reviewed:   Imaging Personally Reviewed:  Electrocardiogram Personally Reviewed:    COORDINATION OF CARE:  Care Discussed with Consultants/Other Providers [Y/N]: d/w ID Dr. Rukhsana davis follow-up blood cultures at 48h, if negative can switch to Keflex for discharge   Prior or Outpatient Records Reviewed [Y/N]:

## 2020-11-17 NOTE — PROGRESS NOTE ADULT - PROBLEM SELECTOR PLAN 3
Patient endorsing several weeks of R sided hearing loss  - Discussed with onc, can be possible side effect of chemotherapy with Carboplatin   - Discussed with ENT, will need outpatient audiogram, not done inpatient

## 2020-11-17 NOTE — PROGRESS NOTE ADULT - PROBLEM SELECTOR PLAN 4
Patient follows with Dr. Cassie Bell at [facility] [place]   Last chemo was deferred due to COVID-19 diagnosis  Oncology recs appreciated. Per discussion with ID, patient can be reswabbed for COVID on 11/19 before resuming chemotherapy

## 2020-11-17 NOTE — PROGRESS NOTE ADULT - PROBLEM SELECTOR PLAN 1
Over R chest wall port site   - S/P IV Vanco and Zosyn by ED.  - Will continue IV Vancomycin  - Prelim blood cultures NGTD  - ID c/s appreciated - if blood cultures negative at 48h can likely switch to Keflex for discharge

## 2020-11-17 NOTE — PROGRESS NOTE ADULT - SUBJECTIVE AND OBJECTIVE BOX
60y old  Female who presents with a chief complaint of infection of chemo port (17 Nov 2020 14:09)      Interval history:  Afebrile, feels better, improved pain at the port site.       Allergies:   No Known Allergies      Antimicrobials:  vancomycin  IVPB 1000 milliGRAM(s) IV Intermittent every 12 hours      REVIEW OF SYSTEMS:  No chest pain  No cough, no SOB  No N/V/D, no abdominal pain  No dysuria  No rash.       Vital Signs Last 24 Hrs  T(C): 37.1 (11-17-20 @ 14:20), Max: 37.4 (11-17-20 @ 09:00)  T(F): 98.8 (11-17-20 @ 14:20), Max: 99.3 (11-17-20 @ 09:00)  HR: 92 (11-17-20 @ 14:20) (86 - 96)  BP: 122/82 (11-17-20 @ 14:20) (110/64 - 122/82)  BP(mean): --  RR: 16 (11-17-20 @ 14:20) (16 - 18)  SpO2: 98% (11-17-20 @ 14:20) (96% - 100%)      PHYSICAL EXAM:  Patient in no acute distress. AAOX3.  RT CHEST PORT WITH IMPROVED ERYTHEMA, SOME swelling and tenderness.   Cardiovascular: S1S2 normal.  Lungs: Good air entry B/L lung fields.  Gastrointestinal: soft, nontender, nondistended.  Extremities: no edema.  IV sites not inflamed.                           9.9    5.74  )-----------( 370      ( 17 Nov 2020 11:49 )             31.5   11-17    141  |  105  |  10  ----------------------------<  97  3.8   |  27  |  0.65    Ca    9.8      17 Nov 2020 11:49  Mg     1.9     11-17      Culture - Blood (collected 15 Nov 2020 21:28)  Source: .Blood Port Device  Preliminary Report (16 Nov 2020 22:01):    No growth to date.    Culture - Blood (collected 15 Nov 2020 17:10)  Source: .Blood Blood-Peripheral  Preliminary Report (16 Nov 2020 18:02):    No growth to date.    Culture - Blood (collected 15 Nov 2020 17:10)  Source: .Blood Blood-Peripheral  Preliminary Report (16 Nov 2020 18:02):    No growth to date.

## 2020-11-17 NOTE — CHART NOTE - NSCHARTNOTEFT_GEN_A_CORE
Patient called her outpatient oncologist, Dr Bell and complained of unilateral hearing loss.   This is not acute. She has been experiencing this for a few weeks.     Patient has been on Carboplatin and hearing loss can be a side effect of this.   Please consult ENT if possible.     Patient's cancer treatment is with curative intent and should be resumed as soon as patient has recovered from infections and is well.   Would appreciate ID input about when chemotherapy can be restarted in asymptomatic patients with persistently positive COVID testing.     Will follow. Please do not hesitate to call back with questions.     Taya Christiansen MD  Medical Oncology Attending  C: [phone]

## 2020-11-18 ENCOUNTER — APPOINTMENT (OUTPATIENT)
Dept: INFUSION THERAPY | Facility: HOSPITAL | Age: 60
End: 2020-11-18

## 2020-11-18 ENCOUNTER — APPOINTMENT (OUTPATIENT)
Dept: HEMATOLOGY ONCOLOGY | Facility: CLINIC | Age: 60
End: 2020-11-18

## 2020-11-18 ENCOUNTER — TRANSCRIPTION ENCOUNTER (OUTPATIENT)
Age: 60
End: 2020-11-18

## 2020-11-18 VITALS
HEART RATE: 84 BPM | DIASTOLIC BLOOD PRESSURE: 73 MMHG | OXYGEN SATURATION: 100 % | RESPIRATION RATE: 16 BRPM | SYSTOLIC BLOOD PRESSURE: 121 MMHG | TEMPERATURE: 98 F

## 2020-11-18 PROCEDURE — 99239 HOSP IP/OBS DSCHRG MGMT >30: CPT

## 2020-11-18 PROCEDURE — 99232 SBSQ HOSP IP/OBS MODERATE 35: CPT

## 2020-11-18 RX ORDER — LINEZOLID 600 MG/300ML
1 INJECTION, SOLUTION INTRAVENOUS
Qty: 15 | Refills: 0
Start: 2020-11-18 | End: 2020-11-24

## 2020-11-18 RX ADMIN — PANTOPRAZOLE SODIUM 40 MILLIGRAM(S): 20 TABLET, DELAYED RELEASE ORAL at 07:20

## 2020-11-18 RX ADMIN — Medication 250 MILLIGRAM(S): at 03:00

## 2020-11-18 RX ADMIN — Medication 250 MILLIGRAM(S): at 14:19

## 2020-11-18 NOTE — PROGRESS NOTE ADULT - SUBJECTIVE AND OBJECTIVE BOX
60y old  Female who presents with a chief complaint of infection of chemo port (18 Nov 2020 16:08)      Interval history:  Afebrile, pain and swelling much improved at the port site.       Allergies:   No Known Allergies    Antimicrobials:  vancomycin  IVPB 1000 milliGRAM(s) IV Intermittent every 12 hours      REVIEW OF SYSTEMS:  No chest pain   No SOB  No N/V  No dysuria   No rash.       Vital Signs Last 24 Hrs  T(C): 36.6 (11-18-20 @ 10:04), Max: 36.7 (11-18-20 @ 02:00)  T(F): 97.9 (11-18-20 @ 10:04), Max: 98 (11-18-20 @ 02:00)  HR: 84 (11-18-20 @ 10:04) (84 - 95)  BP: 121/73 (11-18-20 @ 10:04) (105/61 - 124/79)  BP(mean): --  RR: 16 (11-18-20 @ 10:04) (16 - 18)  SpO2: 100% (11-18-20 @ 10:04) (92% - 100%)      PHYSICAL EXAM:  Patient in no acute distress. AAOX3.  Rt chest port site with almost resolved erythema, swelling and tenderness.   Cardiovascular: S1S2 normal.  Lungs: Good air entry B/L lung fields.  Gastrointestinal: soft, nontender, nondistended.  Extremities: no edema.  IV sites not inflamed.                             9.9    5.74  )-----------( 370      ( 17 Nov 2020 11:49 )             31.5   11-17    141  |  105  |  10  ----------------------------<  97  3.8   |  27  |  0.65    Ca    9.8      17 Nov 2020 11:49  Mg     1.9     11-17      Culture - Blood (collected 15 Nov 2020 21:28)  Source: .Blood Port Device  Preliminary Report (16 Nov 2020 22:01):    No growth to date.    Culture - Blood (collected 15 Nov 2020 17:10)  Source: .Blood Blood-Peripheral  Preliminary Report (16 Nov 2020 18:02):    No growth to date.    Culture - Blood (collected 15 Nov 2020 17:10)  Source: .Blood Blood-Peripheral  Preliminary Report (16 Nov 2020 18:02):    No growth to date.

## 2020-11-18 NOTE — PROGRESS NOTE ADULT - PROBLEM SELECTOR PLAN 1
Over R chest wall port site   - S/P IV Vanco and Zosyn by ED.  - Will continue IV Vancomycin  - Prelim blood cultures NGTD  - ID c/s appreciated - if blood cultures negative at 48h can likely switch to Keflex for discharge Over R chest wall port site   - S/P IV Vanco and Zosyn by ED.  - Will continue IV Vancomycin  - Prelim blood cultures NGTD  - ID c/s appreciated - if blood cultures negative at 48h can likely switch to Linezolid for discharge

## 2020-11-18 NOTE — PROGRESS NOTE ADULT - REASON FOR ADMISSION
infection of chemo port

## 2020-11-18 NOTE — PROGRESS NOTE ADULT - PROBLEM SELECTOR PLAN 4
[place] [facility] [place]   Last chemo was deferred due to COVID-19 diagnosis  Oncology recs appreciated. Per discussion with ID, patient can be reswabbed for COVID on 11/19 before resuming chemotherapy

## 2020-11-18 NOTE — PROGRESS NOTE ADULT - ATTENDING COMMENTS
Huan Soares  Pager: [phone]. If no response or past 5 pm call [phone].
Huan Soares  Pager: [phone]. If no response or past 5 pm call [phone].
Patient stable for d/c home today. Per ID, can switch to Linezolid for discharge. Patient with negative blood cultures and significant improvement of cellulitis over port site. She is asymptomatic from COVID standpoint and will get reswabbed as an outpatient, earliest tomorrow in order to test negative before continuing chemotherapy. She [place] [facility] [place]    31 minutes spent on discharge planning

## 2020-11-18 NOTE — DISCHARGE NOTE NURSING/CASE MANAGEMENT/SOCIAL WORK - PATIENT PORTAL LINK FT
You can access the FollowMyHealth Patient Portal offered by [facility] by registering at the following website: [email or web address]. By joining [facility]’s FollowMyHealth portal, you will also be able to view your health information using other applications (apps) compatible with our system.

## 2020-11-18 NOTE — PROGRESS NOTE ADULT - ASSESSMENT
60F with pmh Left Breast Ca (last chemo on 10/20/20,) HLD and R Chemo/infuse port presents with 2 days of right chest port erythema and pain.     low grade temp, no leucocytosis,   concern for local port site infection.   + COVID-19 infection, not symptomatic anymore.       Plan:   c/w vancomycin at current dose  monitor vancomycin trough and creatinine to avoid nephrotoxicity and ensure efficacy   can switch to linezolid 600 mg po bid on discharge to finish 7 more days.   follow up blood cx, NTD   nose cx in lab   trend CBC   repeat COVID test can be performed Thursday.

## 2020-11-18 NOTE — PROGRESS NOTE ADULT - SUBJECTIVE AND OBJECTIVE BOX
[facility] Division of Hospital Medicine  Elisa Sanchez MD  Pager: [phone]      Patient is a 60y old  Female who presents with a chief complaint of infection of chemo port (17 Nov 2020 15:33)      SUBJECTIVE / OVERNIGHT EVENTS: patient feels well and wants to go home. port site is sore but states it feels much better, redness went down.     MEDICATIONS  (STANDING):  enoxaparin Injectable 40 milliGRAM(s) SubCutaneous every 24 hours  pantoprazole    Tablet 40 milliGRAM(s) Oral before breakfast  simvastatin 20 milliGRAM(s) Oral at bedtime  vancomycin  IVPB 1000 milliGRAM(s) IV Intermittent every 12 hours    MEDICATIONS  (PRN):  acetaminophen   Tablet .. 650 milliGRAM(s) Oral every 6 hours PRN Temp greater or equal to 38C (100.4F), Mild Pain (1 - 3), Moderate Pain (4 - 6)      CAPILLARY BLOOD GLUCOSE        I&O's Summary    17 Nov 2020 07:01  -  18 Nov 2020 07:00  --------------------------------------------------------  IN: 2000 mL / OUT: 0 mL / NET: 2000 mL        PHYSICAL EXAM:  Vital Signs Last 24 Hrs  T(C): 36.6 (18 Nov 2020 10:04), Max: 36.7 (18 Nov 2020 02:00)  T(F): 97.9 (18 Nov 2020 10:04), Max: 98 (18 Nov 2020 02:00)  HR: 84 (18 Nov 2020 10:04) (84 - 95)  BP: 121/73 (18 Nov 2020 10:04) (105/61 - 124/79)  BP(mean): --  RR: 16 (18 Nov 2020 10:04) (16 - 18)  SpO2: 100% (18 Nov 2020 10:04) (92% - 100%)    CONSTITUTIONAL: NAD, well-developed, well-groomed  RESPIRATORY: Normal respiratory effort; lungs are clear to auscultation bilaterally  CARDIOVASCULAR:  S1/S2; No lower extremity edema  CHEST WALL: R chest wall site w/ erythema, warmth over mediport site - improved   ABDOMEN: Nontender to palpation, normoactive bowel sounds, no rebound/guarding  PSYCH: A+O to person, place, and time; affect appropriate  NEUROLOGY: no focal deficits    LABS:                        9.9    5.74  )-----------( 370      ( 17 Nov 2020 11:49 )             31.5     11-17    141  |  105  |  10  ----------------------------<  97  3.8   |  27  |  0.65    Ca    9.8      17 Nov 2020 11:49  Mg     1.9     11-17                Culture - Blood (collected 15 Nov 2020 21:28)  Source: .Blood Port Device  Preliminary Report (16 Nov 2020 22:01):    No growth to date.    Culture - Blood (collected 15 Nov 2020 17:10)  Source: .Blood Blood-Peripheral  Preliminary Report (16 Nov 2020 18:02):    No growth to date.    Culture - Blood (collected 15 Nov 2020 17:10)  Source: .Blood Blood-Peripheral  Preliminary Report (16 Nov 2020 18:02):    No growth to date.        RADIOLOGY & ADDITIONAL TESTS:  Results Reviewed:   Imaging Personally Reviewed:  Electrocardiogram Personally Reviewed:    COORDINATION OF CARE:  Care Discussed with Consultants/Other Providers [Y/N]:  Prior or Outpatient Records Reviewed [Y/N]:   [facility] Division of Hospital Medicine  Elisa Sanchez MD  Pager: [phone]      Patient is a 60y old  Female who presents with a chief complaint of infection of chemo port (17 Nov 2020 15:33)      SUBJECTIVE / OVERNIGHT EVENTS: patient feels well and wants to go home. port site is sore but states it feels much better, redness went down.     MEDICATIONS  (STANDING):  enoxaparin Injectable 40 milliGRAM(s) SubCutaneous every 24 hours  pantoprazole    Tablet 40 milliGRAM(s) Oral before breakfast  simvastatin 20 milliGRAM(s) Oral at bedtime  vancomycin  IVPB 1000 milliGRAM(s) IV Intermittent every 12 hours    MEDICATIONS  (PRN):  acetaminophen   Tablet .. 650 milliGRAM(s) Oral every 6 hours PRN Temp greater or equal to 38C (100.4F), Mild Pain (1 - 3), Moderate Pain (4 - 6)      CAPILLARY BLOOD GLUCOSE        I&O's Summary    17 Nov 2020 07:01  -  18 Nov 2020 07:00  --------------------------------------------------------  IN: 2000 mL / OUT: 0 mL / NET: 2000 mL        PHYSICAL EXAM:  Vital Signs Last 24 Hrs  T(C): 36.6 (18 Nov 2020 10:04), Max: 36.7 (18 Nov 2020 02:00)  T(F): 97.9 (18 Nov 2020 10:04), Max: 98 (18 Nov 2020 02:00)  HR: 84 (18 Nov 2020 10:04) (84 - 95)  BP: 121/73 (18 Nov 2020 10:04) (105/61 - 124/79)  BP(mean): --  RR: 16 (18 Nov 2020 10:04) (16 - 18)  SpO2: 100% (18 Nov 2020 10:04) (92% - 100%)    CONSTITUTIONAL: NAD, well-developed, well-groomed  RESPIRATORY: Normal respiratory effort; lungs are clear to auscultation bilaterally  CARDIOVASCULAR:  S1/S2; No lower extremity edema  CHEST WALL: R chest wall site w/ improvement in erythema, warmth over mediport site  ABDOMEN: Nontender to palpation, normoactive bowel sounds, no rebound/guarding  PSYCH: A+O to person, place, and time; affect appropriate  NEUROLOGY: no focal deficits    LABS:                        9.9    5.74  )-----------( 370      ( 17 Nov 2020 11:49 )             31.5     11-17    141  |  105  |  10  ----------------------------<  97  3.8   |  27  |  0.65    Ca    9.8      17 Nov 2020 11:49  Mg     1.9     11-17      Culture - Blood (collected 15 Nov 2020 21:28)  Source: .Blood Port Device  Preliminary Report (16 Nov 2020 22:01):    No growth to date.    Culture - Blood (collected 15 Nov 2020 17:10)  Source: .Blood Blood-Peripheral  Preliminary Report (16 Nov 2020 18:02):    No growth to date.    Culture - Blood (collected 15 Nov 2020 17:10)  Source: .Blood Blood-Peripheral  Preliminary Report (16 Nov 2020 18:02):    No growth to date.        RADIOLOGY & ADDITIONAL TESTS:  Results Reviewed:   Imaging Personally Reviewed:  Electrocardiogram Personally Reviewed:    COORDINATION OF CARE:  Care Discussed with Consultants/Other Providers [Y/N]:  Prior or Outpatient Records Reviewed [Y/N]:

## 2020-11-18 NOTE — PROGRESS NOTE ADULT - ASSESSMENT
60F with hx of breast CA on chemotherapy, HLD who presents with 2 days of right chest port erythema and pain, admitted for Cellulitis around [facility].

## 2020-11-19 ENCOUNTER — OUTPATIENT (OUTPATIENT)
Dept: OUTPATIENT SERVICES | Facility: HOSPITAL | Age: 60
LOS: 1 days | Discharge: HOME | End: 2020-11-19

## 2020-11-19 ENCOUNTER — TRANSCRIPTION ENCOUNTER (OUTPATIENT)
Age: 60
End: 2020-11-19

## 2020-11-19 DIAGNOSIS — C50.919 MALIGNANT NEOPLASM OF UNSPECIFIED SITE OF UNSPECIFIED FEMALE BREAST: ICD-10-CM

## 2020-11-19 DIAGNOSIS — Z98.890 OTHER SPECIFIED POSTPROCEDURAL STATES: Chronic | ICD-10-CM

## 2020-11-19 DIAGNOSIS — Z86.39 PERSONAL HISTORY OF OTHER ENDOCRINE, NUTRITIONAL AND METABOLIC DISEASE: Chronic | ICD-10-CM

## 2020-11-19 LAB
SARS-COV-2 IGG SERPL QL IA: POSITIVE
SARS-COV-2 IGM SERPL IA-ACNC: 6.04 INDEX — HIGH

## 2020-11-20 ENCOUNTER — APPOINTMENT (OUTPATIENT)
Dept: INFUSION THERAPY | Facility: HOSPITAL | Age: 60
End: 2020-11-20

## 2020-11-20 LAB
CULTURE RESULTS: SIGNIFICANT CHANGE UP
SPECIMEN SOURCE: SIGNIFICANT CHANGE UP

## 2020-11-24 ENCOUNTER — APPOINTMENT (OUTPATIENT)
Dept: INFUSION THERAPY | Facility: HOSPITAL | Age: 60
End: 2020-11-24

## 2020-11-24 ENCOUNTER — LABORATORY RESULT (OUTPATIENT)
Age: 60
End: 2020-11-24

## 2020-12-01 ENCOUNTER — LABORATORY RESULT (OUTPATIENT)
Age: 60
End: 2020-12-01

## 2020-12-01 ENCOUNTER — APPOINTMENT (OUTPATIENT)
Dept: INFUSION THERAPY | Facility: HOSPITAL | Age: 60
End: 2020-12-01

## 2020-12-02 ENCOUNTER — LABORATORY RESULT (OUTPATIENT)
Age: 60
End: 2020-12-02

## 2020-12-02 ENCOUNTER — RESULT REVIEW (OUTPATIENT)
Age: 60
End: 2020-12-02

## 2020-12-02 ENCOUNTER — APPOINTMENT (OUTPATIENT)
Dept: INFUSION THERAPY | Facility: HOSPITAL | Age: 60
End: 2020-12-02

## 2020-12-02 LAB
BASOPHILS # BLD AUTO: 0.03 K/UL — SIGNIFICANT CHANGE UP (ref 0–0.2)
BASOPHILS NFR BLD AUTO: 0.2 % — SIGNIFICANT CHANGE UP (ref 0–2)
EOSINOPHIL # BLD AUTO: 0 K/UL — SIGNIFICANT CHANGE UP (ref 0–0.5)
EOSINOPHIL NFR BLD AUTO: 0 % — SIGNIFICANT CHANGE UP (ref 0–6)
HCT VFR BLD CALC: 28.6 % — LOW (ref 34.5–45)
HGB BLD-MCNC: 10 G/DL — LOW (ref 11.5–15.5)
IMM GRANULOCYTES NFR BLD AUTO: 1.1 % — SIGNIFICANT CHANGE UP (ref 0–1.5)
LYMPHOCYTES # BLD AUTO: 1.64 K/UL — SIGNIFICANT CHANGE UP (ref 1–3.3)
LYMPHOCYTES # BLD AUTO: 12 % — LOW (ref 13–44)
MCHC RBC-ENTMCNC: 32.7 PG — SIGNIFICANT CHANGE UP (ref 27–34)
MCHC RBC-ENTMCNC: 35 G/DL — SIGNIFICANT CHANGE UP (ref 32–36)
MCV RBC AUTO: 93.5 FL — SIGNIFICANT CHANGE UP (ref 80–100)
MONOCYTES # BLD AUTO: 0.62 K/UL — SIGNIFICANT CHANGE UP (ref 0–0.9)
MONOCYTES NFR BLD AUTO: 4.5 % — SIGNIFICANT CHANGE UP (ref 2–14)
NEUTROPHILS # BLD AUTO: 11.23 K/UL — HIGH (ref 1.8–7.4)
NEUTROPHILS NFR BLD AUTO: 82.2 % — HIGH (ref 43–77)
NRBC # BLD: 0 /100 WBCS — SIGNIFICANT CHANGE UP (ref 0–0)
PLATELET # BLD AUTO: 198 K/UL — SIGNIFICANT CHANGE UP (ref 150–400)
RBC # BLD: 3.06 M/UL — LOW (ref 3.8–5.2)
RBC # FLD: 14.4 % — SIGNIFICANT CHANGE UP (ref 10.3–14.5)
WBC # BLD: 13.67 K/UL — HIGH (ref 3.8–10.5)
WBC # FLD AUTO: 13.67 K/UL — HIGH (ref 3.8–10.5)

## 2020-12-03 ENCOUNTER — APPOINTMENT (OUTPATIENT)
Dept: INFUSION THERAPY | Facility: HOSPITAL | Age: 60
End: 2020-12-03

## 2020-12-03 DIAGNOSIS — R11.2 NAUSEA WITH VOMITING, UNSPECIFIED: ICD-10-CM

## 2020-12-03 DIAGNOSIS — Z51.89 ENCOUNTER FOR OTHER SPECIFIED AFTERCARE: ICD-10-CM

## 2020-12-03 DIAGNOSIS — Z51.11 ENCOUNTER FOR ANTINEOPLASTIC CHEMOTHERAPY: ICD-10-CM

## 2020-12-09 ENCOUNTER — LABORATORY RESULT (OUTPATIENT)
Age: 60
End: 2020-12-09

## 2020-12-09 ENCOUNTER — RESULT REVIEW (OUTPATIENT)
Age: 60
End: 2020-12-09

## 2020-12-09 ENCOUNTER — APPOINTMENT (OUTPATIENT)
Dept: HEMATOLOGY ONCOLOGY | Facility: CLINIC | Age: 60
End: 2020-12-09
Payer: COMMERCIAL

## 2020-12-09 ENCOUNTER — APPOINTMENT (OUTPATIENT)
Dept: INFUSION THERAPY | Facility: HOSPITAL | Age: 60
End: 2020-12-09

## 2020-12-09 VITALS
BODY MASS INDEX: 30.67 KG/M2 | SYSTOLIC BLOOD PRESSURE: 122 MMHG | WEIGHT: 166.67 LBS | HEART RATE: 106 BPM | TEMPERATURE: 97.8 F | OXYGEN SATURATION: 97 % | DIASTOLIC BLOOD PRESSURE: 86 MMHG | RESPIRATION RATE: 16 BRPM | HEIGHT: 61.85 IN

## 2020-12-09 DIAGNOSIS — T80.212A LOCAL INFECTION DUE TO CENTRAL VENOUS CATHETER, INITIAL ENCOUNTER: ICD-10-CM

## 2020-12-09 DIAGNOSIS — H91.90 UNSPECIFIED HEARING LOSS, UNSPECIFIED EAR: ICD-10-CM

## 2020-12-09 LAB
BASOPHILS # BLD AUTO: 0 K/UL — SIGNIFICANT CHANGE UP (ref 0–0.2)
BASOPHILS NFR BLD AUTO: 0 % — SIGNIFICANT CHANGE UP (ref 0–2)
EOSINOPHIL # BLD AUTO: 0 K/UL — SIGNIFICANT CHANGE UP (ref 0–0.5)
EOSINOPHIL NFR BLD AUTO: 0 % — SIGNIFICANT CHANGE UP (ref 0–6)
HCT VFR BLD CALC: 29.1 % — LOW (ref 34.5–45)
HGB BLD-MCNC: 9.6 G/DL — LOW (ref 11.5–15.5)
LYMPHOCYTES # BLD AUTO: 17 % — SIGNIFICANT CHANGE UP (ref 13–44)
LYMPHOCYTES # BLD AUTO: 2.12 K/UL — SIGNIFICANT CHANGE UP (ref 1–3.3)
MCHC RBC-ENTMCNC: 31.8 PG — SIGNIFICANT CHANGE UP (ref 27–34)
MCHC RBC-ENTMCNC: 33 G/DL — SIGNIFICANT CHANGE UP (ref 32–36)
MCV RBC AUTO: 96.4 FL — SIGNIFICANT CHANGE UP (ref 80–100)
MONOCYTES # BLD AUTO: 1.25 K/UL — HIGH (ref 0–0.9)
MONOCYTES NFR BLD AUTO: 10 % — SIGNIFICANT CHANGE UP (ref 2–14)
NEUTROPHILS # BLD AUTO: 9.1 K/UL — HIGH (ref 1.8–7.4)
NEUTROPHILS NFR BLD AUTO: 73 % — SIGNIFICANT CHANGE UP (ref 43–77)
NRBC # BLD: 0 /100 — SIGNIFICANT CHANGE UP (ref 0–0)
NRBC # BLD: SIGNIFICANT CHANGE UP /100 WBCS (ref 0–0)
PLAT MORPH BLD: NORMAL — SIGNIFICANT CHANGE UP
PLATELET # BLD AUTO: 175 K/UL — SIGNIFICANT CHANGE UP (ref 150–400)
RBC # BLD: 3.02 M/UL — LOW (ref 3.8–5.2)
RBC # FLD: 15.1 % — HIGH (ref 10.3–14.5)
RBC BLD AUTO: SIGNIFICANT CHANGE UP
WBC # BLD: 12.47 K/UL — HIGH (ref 3.8–10.5)
WBC # FLD AUTO: 12.47 K/UL — HIGH (ref 3.8–10.5)

## 2020-12-09 PROCEDURE — 99072 ADDL SUPL MATRL&STAF TM PHE: CPT

## 2020-12-09 PROCEDURE — 99215 OFFICE O/P EST HI 40 MIN: CPT

## 2020-12-09 NOTE — REASON FOR VISIT
[Follow-Up Visit] : a follow-up [Other: _____] : [unfilled] [FreeTextEntry2] : ER+ 80%,AR+ 50%, HER 2 + breast cancer

## 2020-12-09 NOTE — PHYSICAL EXAM
[Fully active, able to carry on all pre-disease performance without restriction] : Status 0 - Fully active, able to carry on all pre-disease performance without restriction [Normal] : affect appropriate [de-identified] : Port in c/d/i [de-identified] : BL breasts with mastopexy scars. Left breast with 3x3 cm hard retroareolar mass. Left nipple flattened/inverted. + Left axillary LN, No SCLn

## 2020-12-09 NOTE — HISTORY OF PRESENT ILLNESS
[de-identified] : Ms.Joanne Damon is a 60 year old female here for an evaluation of breast cancer. Her oncologic history is as follows:\par \par Patient noticed left breast mass for 4 months and presented to plastic surgeon's office. She was sent for breast imaging on 8/19/2020 BIRADS 5 which showed new irregular mass with calcifications and nipple retraction in the left retroareolar location on mammography, corresponding to a mass in the left breast 6:00 location 2 cm from the nipple on the concurrent breast ultrasound. Ultrasound-guided core biopsy is recommended for diagnosis.\par - Abnormal left axillary lymph nodes on mammography and sonography, including lymph nodes demonstrating microcalcifications. Ultrasound-guided core biopsy of a representative abnormal appearing left axillary lymph node with calcifications is recommended.\par - Faint loosely grouped calcifications in the upper central left breast and the upper central right breast which are indeterminate. Sampling stereotactic biopsy of both breasts is recommended.\par - Loosely grouped predominantly coarse dystrophic calcifications associated with areas of central lucency in the posterior medial right breast which are thought to represent fat necrosis. Six-month follow-up right diagnostic mammography is recommended. She underwent New irregular mass with calcifications and nipple retraction in the left retroareolar location on mammography, corresponding to a mass in the left breast 6:00 location 2 cm from the nipple on the concurrent breast ultrasound. Ultrasound-guided core biopsy is recommended for diagnosis.\par  She had a left, 6 o'clock, 2 cm FN, core biopsy on on 8/28/2020 which showed Invasive poorly differentiated ductal carcinoma with areas of micropapillary features\par - Banner score 9/9 (3 + 3 + 3)\par - Invasive tumor measures at least 1.0 cm\par - Ductal carcinoma in situ (focal), cribriform pattern with\par high nuclear grade and central necrosis\par \par  Breast, left, axillary lymph node, core biopsy\par - Invasive poorly differentiated ductal carcinoma with mild lymphocytic infiltrate\par ER+ 80%,TX+ 50%, HER 2 + breast cancer\par \par She has been on Prempro x 7 years for hot flashes. She is off now x 2 weeks. Hot flashes are ok\par She had breast augmentation with Dr Geiger. Implants exchanged 2019.  Benign biopsy in 2019\par \par 9/30/2020\par 9/25/2020 ECHO LVEF 57%\par PETCT [facility]  09/18/2020 No evidence of distant metastasis.\par EKG 2/2020 faxed by PCP , repeat 9/30/2020 done , Chemo consent obtained \par Right side Medport in place, catheter tip at the distal SVC [de-identified] : Ms. KELSY CRANE  is here for a follow up appt for left breast ca, dx 8/28/2020  started neoadjuvant TCHP with onpro on 9/30/2020.  She tested positive for Covid by PCR on 10/30/2020.  A repeat test at 2 weeks was positive again on 11/13/2020.  She was admitted on 11/15/2020 with port site infection.  She was treated with IV antibiotics and was discharged on Zyvox.  Subsequently Covid PCR was done x2 and was negative.  She received cycle 3 on 12/2/2020. \par She is here for cycle 3-day 8\par \par She reports mild fatigue and altered taste x 3 days. She was able to function, maintained PO intake, weight stable. She had mild nausea, took Reglan, no vomiting. SHe had diarrhea, used imodium. She reports some mouth sores, not painful. She has acneiform form rash on her chest and face. resolved now. She has mild bone pain, no neuropathy, no fevers. No cardio pulm sx.  \par Today she reports mild erythema at the port site. we discussed to use peripheral IV today and use Keflex for 7 days.  If she continues to have recurrent Port site infections, we will get ID evaluation.  She also reports mild hearing loss in the right ear.  Concerned related to carboplatin vs wax. Carbo causes ototoxicity in 1-5% cases.  Recommend urgent ENT evaluation.  Coordinators notified to schedule urgent appointment.  If related to carbo, would consider dose reduction.\par postmenopausal\par breast mass shrinking but still sizeable

## 2020-12-09 NOTE — ASSESSMENT
[Curative] : Goals of care discussed with patient: Curative [FreeTextEntry1] : Ms. KELSY CRANE is a 60 year old female with clinically T2 N1 Mx poorly differentiated invasive ductal carcinoma ER/LA positive and HER-2/lazara POSITIVE. PET/CT 9/2020 ALEXX. Neoadj TCHP started 9/30/2020. \par \par -Breast ca: Ms. KELSY CRANE is on TCHP C3D8. Tolerating chemo with expected toxicity. Clinically good response. Counts good. Monitor for neuropathy, pulm and cardiotoxicity. Check LFT and lytes today\par - Today she reports mild erythema at the port site. we discussed to use peripheral IV today and use Keflex for 7 days.  If she continues to have recurrent Port site infections, we will get ID evaluation. \par -  She also reports mild hearing loss in the right ear.  Concerned related to carboplatin vs wax. Carbo causes ototoxicity in 1-5% cases.  Recommend urgent ENT evaluation.  Coordinators notified to schedule urgent appointment.  If related to carbo, would consider dose reduction.\par - At risk for chemo induced cardiomyopathy: Check ECHO Q3m.LAst echo: 9/25/2020 LVEF 57% next due DEC\par - Chemo induced anemia- Grade 1. No transfusion needed. Monitor counts\par - Mild leukocytosis secondary to ONPRO. No neutropenia or thrombocytopenia. \par - Mild mucositis/ oral thrush-  Start Nystatin swish and spit 5cc q 8rs for 4 days. Continue miracle mouth wash or Carafate.  \par - Chemo induced diarrhea- Continue Imodium prn. IV fluids if sx worsen\par - Chemo induced N/V- Will get premedications with Emend, dexamethasone and Aloxi. she will continue dexamethasone for next 3 days for antinausea prophylaxis. She will use Reglan, Compazine as needed. IV fluid today.\par - GF induced bone pain- take Claritin. \par - Chemo induced dysgeusia and fatigue: Encourage p.o. fluids, small frequent meals. 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 \par - Chemo induced dermatitis: She has acneiform rash on her chest and face. Improved\par - Instructed to call office and go directly to the emergency room with fever more than 100.4, shaking chills, productive cough, sore throat, shortness of breath or urinary symptoms. Patient verbalized understanding and agreement.\par - Emotional support provided, all questions answered.\par - She will undergo surgery after 6 cycles. +/- RT. Candidate for endocrine therapy.\par - We also discussed genetic testing.  She will be referred for genetic testing at a later time.\par The patient had plenty of time to ask questions and all of his questions were answered to satisfaction. I gave my office phone number and encouraged to call with any questions or additional information. \par \par RTO D8 each cycle  for MD IV fluids  q 3wks for tx\par

## 2020-12-10 ENCOUNTER — NON-APPOINTMENT (OUTPATIENT)
Age: 60
End: 2020-12-10

## 2020-12-14 NOTE — CHART NOTE - NSCHARTNOTESELECT_GEN_ALL_CORE
PHYSICIAN NEXT STEPS:  Call the Patient    CHIEF COMPLAINT:  Chief Complaint/Protocol Used: Inappropriate Transfer  Onset: N/A      ASSESSMENT:  ? Onset: N/A  ? Normal True  ? Normal, no trouble breathing True  -------------------------------------------------------    DISPOSITION:  Disposition Recommendation: Unspecified  Patient Directed To: Unspecified  Patient Intended Action: Talk with my doctor      CALL NOTES:  12/14/2020 at 11:22 AM by Jeni Dorsey  ? Patient called the [facility] s/p ED visit at [facility] ED today at 2:00 AM for left side of the body numbness and tingling of the face, arm, and leg;  was told it was stress-related, gave her Ativan in the ED. Grandmother passed away on Friday. Patient denies   new or worsening symptoms since ED visit; has persistent numbness. Patient is seeking a same day appointment. Patient tested positive for COVID-19 on 12/07/2020; no virtual visits available today. Patient is requesting a call back from PCP, Dr. Baudilio Roth to advise on what to do. PCP paged via perfect serve.     DISPOSITION OVERRIDE/PROVIDER CONSULT:  Disposition Override: N/A  Override Source: Unspecified  Consulted with PCP: No  Consulted with On-Call Physician: No    CALLER CONTACT INFO:  Name: Indigo Blake (Self)  Phone 1: [phone] (Mobile)      ENCOUNTER STARTED:  12/14/20 11:18:22 AM  ENCOUNTER ASSIGNED TO/CLOSED BY:  Jeni Dorsey @ 12/14/20 11:22:41 AM      -------------------------------------------------------    UNDERSTANDS CARE ADVICE: Yes    AGREES WITH CARE ADVICE: Yes    WILL FOLLOW CARE ADVICE: Yes    -------------------------------------------------------
ENT/Event Note
Event Note
Oncology

## 2020-12-17 ENCOUNTER — OUTPATIENT (OUTPATIENT)
Dept: OUTPATIENT SERVICES | Facility: HOSPITAL | Age: 60
LOS: 1 days | Discharge: HOME | End: 2020-12-17

## 2020-12-17 DIAGNOSIS — C50.919 MALIGNANT NEOPLASM OF UNSPECIFIED SITE OF UNSPECIFIED FEMALE BREAST: ICD-10-CM

## 2020-12-17 DIAGNOSIS — Z86.39 PERSONAL HISTORY OF OTHER ENDOCRINE, NUTRITIONAL AND METABOLIC DISEASE: Chronic | ICD-10-CM

## 2020-12-17 DIAGNOSIS — Z98.890 OTHER SPECIFIED POSTPROCEDURAL STATES: Chronic | ICD-10-CM

## 2020-12-20 ENCOUNTER — INPATIENT (INPATIENT)
Facility: HOSPITAL | Age: 60
LOS: 7 days | Discharge: HOME HEALTH | End: 2020-12-28
Attending: STUDENT IN AN ORGANIZED HEALTH CARE EDUCATION/TRAINING PROGRAM | Admitting: STUDENT IN AN ORGANIZED HEALTH CARE EDUCATION/TRAINING PROGRAM
Payer: COMMERCIAL

## 2020-12-20 VITALS
SYSTOLIC BLOOD PRESSURE: 134 MMHG | RESPIRATION RATE: 18 BRPM | DIASTOLIC BLOOD PRESSURE: 78 MMHG | HEART RATE: 130 BPM | OXYGEN SATURATION: 97 % | HEIGHT: 62 IN | TEMPERATURE: 100 F

## 2020-12-20 DIAGNOSIS — Z98.890 OTHER SPECIFIED POSTPROCEDURAL STATES: Chronic | ICD-10-CM

## 2020-12-20 DIAGNOSIS — Z86.39 PERSONAL HISTORY OF OTHER ENDOCRINE, NUTRITIONAL AND METABOLIC DISEASE: Chronic | ICD-10-CM

## 2020-12-20 LAB
ALBUMIN SERPL ELPH-MCNC: 4.1 G/DL — SIGNIFICANT CHANGE UP (ref 3.3–5)
ALP SERPL-CCNC: 71 U/L — SIGNIFICANT CHANGE UP (ref 40–120)
ALT FLD-CCNC: 27 U/L — SIGNIFICANT CHANGE UP (ref 4–33)
ANION GAP SERPL CALC-SCNC: 13 MMOL/L — SIGNIFICANT CHANGE UP (ref 7–14)
APTT BLD: 27.8 SEC — SIGNIFICANT CHANGE UP (ref 27–36.3)
AST SERPL-CCNC: 20 U/L — SIGNIFICANT CHANGE UP (ref 4–32)
BASE EXCESS BLDV CALC-SCNC: 1.4 MMOL/L — SIGNIFICANT CHANGE UP (ref -3–2)
BASOPHILS # BLD AUTO: 0.03 K/UL — SIGNIFICANT CHANGE UP (ref 0–0.2)
BASOPHILS NFR BLD AUTO: 0.3 % — SIGNIFICANT CHANGE UP (ref 0–2)
BILIRUB SERPL-MCNC: 0.2 MG/DL — SIGNIFICANT CHANGE UP (ref 0.2–1.2)
BLOOD GAS VENOUS - CREATININE: 0.8 MG/DL — SIGNIFICANT CHANGE UP (ref 0.5–1.3)
BLOOD GAS VENOUS COMPREHENSIVE RESULT: SIGNIFICANT CHANGE UP
BUN SERPL-MCNC: 12 MG/DL — SIGNIFICANT CHANGE UP (ref 7–23)
CALCIUM SERPL-MCNC: 9.4 MG/DL — SIGNIFICANT CHANGE UP (ref 8.4–10.5)
CHLORIDE BLDV-SCNC: 108 MMOL/L — SIGNIFICANT CHANGE UP (ref 96–108)
CHLORIDE SERPL-SCNC: 104 MMOL/L — SIGNIFICANT CHANGE UP (ref 98–107)
CO2 SERPL-SCNC: 23 MMOL/L — SIGNIFICANT CHANGE UP (ref 22–31)
CREAT SERPL-MCNC: 0.81 MG/DL — SIGNIFICANT CHANGE UP (ref 0.5–1.3)
EOSINOPHIL # BLD AUTO: 0 K/UL — SIGNIFICANT CHANGE UP (ref 0–0.5)
EOSINOPHIL NFR BLD AUTO: 0 % — SIGNIFICANT CHANGE UP (ref 0–6)
GAS PNL BLDV: 138 MMOL/L — SIGNIFICANT CHANGE UP (ref 136–146)
GLUCOSE BLDV-MCNC: 104 MG/DL — HIGH (ref 70–99)
GLUCOSE SERPL-MCNC: 104 MG/DL — HIGH (ref 70–99)
HCO3 BLDV-SCNC: 26 MMOL/L — SIGNIFICANT CHANGE UP (ref 20–27)
HCT VFR BLD CALC: 27.5 % — LOW (ref 34.5–45)
HCT VFR BLDA CALC: 28.9 % — LOW (ref 34.5–46.5)
HGB BLD CALC-MCNC: 9.3 G/DL — LOW (ref 11.5–15.5)
HGB BLD-MCNC: 8.9 G/DL — LOW (ref 11.5–15.5)
IANC: 7.65 K/UL — SIGNIFICANT CHANGE UP (ref 1.5–8.5)
IMM GRANULOCYTES NFR BLD AUTO: 0.4 % — SIGNIFICANT CHANGE UP (ref 0–1.5)
INR BLD: 1.12 RATIO — SIGNIFICANT CHANGE UP (ref 0.88–1.17)
LACTATE BLDV-MCNC: 1.4 MMOL/L — SIGNIFICANT CHANGE UP (ref 0.5–2)
LYMPHOCYTES # BLD AUTO: 0.78 K/UL — LOW (ref 1–3.3)
LYMPHOCYTES # BLD AUTO: 8.3 % — LOW (ref 13–44)
MCHC RBC-ENTMCNC: 31.4 PG — SIGNIFICANT CHANGE UP (ref 27–34)
MCHC RBC-ENTMCNC: 32.4 GM/DL — SIGNIFICANT CHANGE UP (ref 32–36)
MCV RBC AUTO: 97.2 FL — SIGNIFICANT CHANGE UP (ref 80–100)
MONOCYTES # BLD AUTO: 0.87 K/UL — SIGNIFICANT CHANGE UP (ref 0–0.9)
MONOCYTES NFR BLD AUTO: 9.3 % — SIGNIFICANT CHANGE UP (ref 2–14)
NEUTROPHILS # BLD AUTO: 7.65 K/UL — HIGH (ref 1.8–7.4)
NEUTROPHILS NFR BLD AUTO: 81.7 % — HIGH (ref 43–77)
NRBC # BLD: 0 /100 WBCS — SIGNIFICANT CHANGE UP
NRBC # FLD: 0 K/UL — SIGNIFICANT CHANGE UP
PCO2 BLDV: 38 MMHG — LOW (ref 41–51)
PH BLDV: 7.44 — HIGH (ref 7.32–7.43)
PLATELET # BLD AUTO: 207 K/UL — SIGNIFICANT CHANGE UP (ref 150–400)
PO2 BLDV: 66 MMHG — HIGH (ref 35–40)
POTASSIUM BLDV-SCNC: 3.4 MMOL/L — SIGNIFICANT CHANGE UP (ref 3.4–4.5)
POTASSIUM SERPL-MCNC: 3.6 MMOL/L — SIGNIFICANT CHANGE UP (ref 3.5–5.3)
POTASSIUM SERPL-SCNC: 3.6 MMOL/L — SIGNIFICANT CHANGE UP (ref 3.5–5.3)
PROT SERPL-MCNC: 6.7 G/DL — SIGNIFICANT CHANGE UP (ref 6–8.3)
PROTHROM AB SERPL-ACNC: 12.8 SEC — SIGNIFICANT CHANGE UP (ref 9.8–13.1)
RBC # BLD: 2.83 M/UL — LOW (ref 3.8–5.2)
RBC # FLD: 16.4 % — HIGH (ref 10.3–14.5)
SAO2 % BLDV: 93.1 % — HIGH (ref 60–85)
SODIUM SERPL-SCNC: 140 MMOL/L — SIGNIFICANT CHANGE UP (ref 135–145)
WBC # BLD: 9.37 K/UL — SIGNIFICANT CHANGE UP (ref 3.8–10.5)
WBC # FLD AUTO: 9.37 K/UL — SIGNIFICANT CHANGE UP (ref 3.8–10.5)

## 2020-12-20 PROCEDURE — 99285 EMERGENCY DEPT VISIT HI MDM: CPT

## 2020-12-20 PROCEDURE — 71045 X-RAY EXAM CHEST 1 VIEW: CPT | Mod: 26

## 2020-12-20 RX ORDER — CEFEPIME 1 G/1
2000 INJECTION, POWDER, FOR SOLUTION INTRAMUSCULAR; INTRAVENOUS ONCE
Refills: 0 | Status: COMPLETED | OUTPATIENT
Start: 2020-12-20 | End: 2020-12-20

## 2020-12-20 RX ORDER — SODIUM CHLORIDE 9 MG/ML
1000 INJECTION INTRAMUSCULAR; INTRAVENOUS; SUBCUTANEOUS ONCE
Refills: 0 | Status: COMPLETED | OUTPATIENT
Start: 2020-12-20 | End: 2020-12-20

## 2020-12-20 RX ORDER — VANCOMYCIN HCL 1 G
1000 VIAL (EA) INTRAVENOUS ONCE
Refills: 0 | Status: COMPLETED | OUTPATIENT
Start: 2020-12-20 | End: 2020-12-20

## 2020-12-20 RX ORDER — ACETAMINOPHEN 500 MG
975 TABLET ORAL ONCE
Refills: 0 | Status: COMPLETED | OUTPATIENT
Start: 2020-12-20 | End: 2020-12-20

## 2020-12-20 RX ADMIN — CEFEPIME 100 MILLIGRAM(S): 1 INJECTION, POWDER, FOR SOLUTION INTRAMUSCULAR; INTRAVENOUS at 22:24

## 2020-12-20 RX ADMIN — Medication 975 MILLIGRAM(S): at 22:06

## 2020-12-20 RX ADMIN — SODIUM CHLORIDE 1000 MILLILITER(S): 9 INJECTION INTRAMUSCULAR; INTRAVENOUS; SUBCUTANEOUS at 22:06

## 2020-12-20 RX ADMIN — Medication 250 MILLIGRAM(S): at 22:54

## 2020-12-20 NOTE — ED PROVIDER NOTE - CLINICAL SUMMARY MEDICAL DECISION MAKING FREE TEXT BOX
59y/o obese F w/ h/o breast CA s/p b/l partial mastectomy on chemotherapy p/w fever. Tachycardic and febrile in ED w/ tenderness over chest wall port. Will do sepsis w/u, start IVF, tylenol and abx and admit for neutropenic fever. 61y/o obese F w/ h/o breast CA s/p b/l partial mastectomy on chemotherapy p/w fever and erythema to port site unreposnive to oral abx. Tachycardic and febrile in ED w/ tenderness over chest wall port. Will do sepsis w/u, start IVF, tylenol and abx and admit for neutropenic fever. 61y/o obese F w/ h/o breast CA s/p b/l partial mastectomy on chemotherapy p/w fever and erythema to port site unresponsive to oral abx. Tachycardic and febrile in ED w/ tenderness over chest wall port. Will do sepsis w/u, start IVF, tylenol and abx and admit for neutropenic fever.

## 2020-12-20 NOTE — ED ADULT NURSE NOTE - OBJECTIVE STATEMENT
Pt is a 60yr old female, A&OX4 and ambulatory, PMH of breast CA (on chemo- last chemo Nov. 18th), complaining of redness/tenderness to the R chest wall port since Nov. 24th and subjective fevers (tmax 101.7F- took Tylenol before arrival) x1 day. Pt states "I was on antibiotics because I told my oncologist about my infected port" unsure of the abx name, last dose taken on Wednesday. R chest wall port noted to be red and warm- no noted drainage. Resp. even and unlabored. Denies CP, SOB, HA, dizziness, abd. pain, N/V, chills, cough, and urinary symptoms. VS as noted. Sinus tachycardia on the CM. NAD. Will continue to monitor.

## 2020-12-20 NOTE — ED PROVIDER NOTE - OBJECTIVE STATEMENT
59y/o obese F w/ h/o breast CA s/p b/l partial mastectomy on chemotherapy (last dose Nov 18) p/w fever. Pt states that she took temperature at home was 101.7, took 325mg Tylenol at home now 100.3. Pt was recently admitted for neutropenic fever on antibiotics with possible source from chemo port. Has had erythema around chest port since November 25. Denies nausea, vomiting, chest pain, cough, sob, abdominal pain, back pain, dysuria, numbness, tingling. 59y/o obese F w/ h/o breast CA s/p b/l partial mastectomy on chemotherapy (last dose Nov 18) p/w fever and erythema to port site. Pt states that she took temperature at home was 101.7, took 325mg Tylenol at home now 100.3. Pt was recently admitted for neutropenic fever on antibiotics with possible source from chemo port cellulitis. Has had erythema around chest port since November 25 and was treated with abx for cellulitis with oral abx. Denies nausea, vomiting, chest pain, cough, sob, abdominal pain, back pain, dysuria, numbness, tingling.

## 2020-12-20 NOTE — ED PROVIDER NOTE - ATTENDING CONTRIBUTION TO CARE
I performed a history and physical exam of the patient and discussed their management with the resident. I reviewed the resident's note and agree with the documented findings and plan of care. I have edited as appropriate. My medical decision making and observations are found above.  NAD, erythema with TTP overlying chest port but no fluctuance, has had prior cellulitis over port treated with iv abx, concern for recurrence. Abx, admit

## 2020-12-20 NOTE — ED ADULT TRIAGE NOTE - CHIEF COMPLAINT QUOTE
Pt st " I have an infusaport and I think it is infected. I have breast cancer on Chemo last treatment Nov 23rd." "Took tylenol around 6:30, fever was 101.7"

## 2020-12-21 DIAGNOSIS — E78.5 HYPERLIPIDEMIA, UNSPECIFIED: ICD-10-CM

## 2020-12-21 DIAGNOSIS — A41.9 SEPSIS, UNSPECIFIED ORGANISM: ICD-10-CM

## 2020-12-21 DIAGNOSIS — R50.9 FEVER, UNSPECIFIED: ICD-10-CM

## 2020-12-21 DIAGNOSIS — Z29.9 ENCOUNTER FOR PROPHYLACTIC MEASURES, UNSPECIFIED: ICD-10-CM

## 2020-12-21 DIAGNOSIS — L03.90 CELLULITIS, UNSPECIFIED: ICD-10-CM

## 2020-12-21 DIAGNOSIS — C50.912 MALIGNANT NEOPLASM OF UNSPECIFIED SITE OF LEFT FEMALE BREAST: ICD-10-CM

## 2020-12-21 LAB
ADD ON TEST-SPECIMEN IN LAB: SIGNIFICANT CHANGE UP
ANION GAP SERPL CALC-SCNC: 11 MMOL/L — SIGNIFICANT CHANGE UP (ref 7–14)
APPEARANCE UR: CLEAR — SIGNIFICANT CHANGE UP
BILIRUB UR-MCNC: NEGATIVE — SIGNIFICANT CHANGE UP
BUN SERPL-MCNC: 9 MG/DL — SIGNIFICANT CHANGE UP (ref 7–23)
CALCIUM SERPL-MCNC: 8.7 MG/DL — SIGNIFICANT CHANGE UP (ref 8.4–10.5)
CHLORIDE SERPL-SCNC: 108 MMOL/L — HIGH (ref 98–107)
CO2 SERPL-SCNC: 22 MMOL/L — SIGNIFICANT CHANGE UP (ref 22–31)
COLOR SPEC: COLORLESS — SIGNIFICANT CHANGE UP
CREAT SERPL-MCNC: 0.75 MG/DL — SIGNIFICANT CHANGE UP (ref 0.5–1.3)
DIFF PNL FLD: NEGATIVE — SIGNIFICANT CHANGE UP
GLUCOSE SERPL-MCNC: 118 MG/DL — HIGH (ref 70–99)
GLUCOSE UR QL: NEGATIVE — SIGNIFICANT CHANGE UP
GRAM STN FLD: SIGNIFICANT CHANGE UP
HCT VFR BLD CALC: 25.9 % — LOW (ref 34.5–45)
HGB BLD-MCNC: 8.1 G/DL — LOW (ref 11.5–15.5)
KETONES UR-MCNC: NEGATIVE — SIGNIFICANT CHANGE UP
LEUKOCYTE ESTERASE UR-ACNC: NEGATIVE — SIGNIFICANT CHANGE UP
MCHC RBC-ENTMCNC: 31.3 GM/DL — LOW (ref 32–36)
MCHC RBC-ENTMCNC: 31.8 PG — SIGNIFICANT CHANGE UP (ref 27–34)
MCV RBC AUTO: 101.6 FL — HIGH (ref 80–100)
METHOD TYPE: SIGNIFICANT CHANGE UP
MSSA DNA SPEC QL NAA+PROBE: SIGNIFICANT CHANGE UP
NITRITE UR-MCNC: NEGATIVE — SIGNIFICANT CHANGE UP
NRBC # BLD: 0 /100 WBCS — SIGNIFICANT CHANGE UP
NRBC # FLD: 0 K/UL — SIGNIFICANT CHANGE UP
PH UR: 6 — SIGNIFICANT CHANGE UP (ref 5–8)
PLATELET # BLD AUTO: 190 K/UL — SIGNIFICANT CHANGE UP (ref 150–400)
POTASSIUM SERPL-MCNC: 3.5 MMOL/L — SIGNIFICANT CHANGE UP (ref 3.5–5.3)
POTASSIUM SERPL-SCNC: 3.5 MMOL/L — SIGNIFICANT CHANGE UP (ref 3.5–5.3)
PROT UR-MCNC: NEGATIVE — SIGNIFICANT CHANGE UP
RBC # BLD: 2.55 M/UL — LOW (ref 3.8–5.2)
RBC # FLD: 17 % — HIGH (ref 10.3–14.5)
SARS-COV-2 IGG SERPL QL IA: POSITIVE
SARS-COV-2 IGM SERPL IA-ACNC: 4.36 INDEX — HIGH
SARS-COV-2 RNA SPEC QL NAA+PROBE: SIGNIFICANT CHANGE UP
SODIUM SERPL-SCNC: 141 MMOL/L — SIGNIFICANT CHANGE UP (ref 135–145)
SP GR SPEC: 1.01 — LOW (ref 1.01–1.02)
SPECIMEN SOURCE: SIGNIFICANT CHANGE UP
UROBILINOGEN FLD QL: SIGNIFICANT CHANGE UP
WBC # BLD: 7.56 K/UL — SIGNIFICANT CHANGE UP (ref 3.8–10.5)
WBC # FLD AUTO: 7.56 K/UL — SIGNIFICANT CHANGE UP (ref 3.8–10.5)

## 2020-12-21 PROCEDURE — 99223 1ST HOSP IP/OBS HIGH 75: CPT

## 2020-12-21 PROCEDURE — 12345: CPT | Mod: NC

## 2020-12-21 PROCEDURE — 99254 IP/OBS CNSLTJ NEW/EST MOD 60: CPT | Mod: GC

## 2020-12-21 RX ORDER — CEFEPIME 1 G/1
2000 INJECTION, POWDER, FOR SOLUTION INTRAMUSCULAR; INTRAVENOUS EVERY 12 HOURS
Refills: 0 | Status: DISCONTINUED | OUTPATIENT
Start: 2020-12-21 | End: 2020-12-22

## 2020-12-21 RX ORDER — SODIUM CHLORIDE 9 MG/ML
1000 INJECTION INTRAMUSCULAR; INTRAVENOUS; SUBCUTANEOUS
Refills: 0 | Status: DISCONTINUED | OUTPATIENT
Start: 2020-12-21 | End: 2020-12-27

## 2020-12-21 RX ORDER — SIMVASTATIN 20 MG/1
20 TABLET, FILM COATED ORAL AT BEDTIME
Refills: 0 | Status: DISCONTINUED | OUTPATIENT
Start: 2020-12-21 | End: 2020-12-28

## 2020-12-21 RX ORDER — CHLORHEXIDINE GLUCONATE 213 G/1000ML
1 SOLUTION TOPICAL DAILY
Refills: 0 | Status: DISCONTINUED | OUTPATIENT
Start: 2020-12-21 | End: 2020-12-28

## 2020-12-21 RX ORDER — CEFEPIME 1 G/1
2000 INJECTION, POWDER, FOR SOLUTION INTRAMUSCULAR; INTRAVENOUS EVERY 8 HOURS
Refills: 0 | Status: DISCONTINUED | OUTPATIENT
Start: 2020-12-21 | End: 2020-12-21

## 2020-12-21 RX ORDER — PANTOPRAZOLE SODIUM 20 MG/1
40 TABLET, DELAYED RELEASE ORAL
Refills: 0 | Status: DISCONTINUED | OUTPATIENT
Start: 2020-12-21 | End: 2020-12-28

## 2020-12-21 RX ORDER — VANCOMYCIN HCL 1 G
1000 VIAL (EA) INTRAVENOUS EVERY 12 HOURS
Refills: 0 | Status: DISCONTINUED | OUTPATIENT
Start: 2020-12-21 | End: 2020-12-22

## 2020-12-21 RX ORDER — ACETAMINOPHEN 500 MG
650 TABLET ORAL EVERY 6 HOURS
Refills: 0 | Status: DISCONTINUED | OUTPATIENT
Start: 2020-12-21 | End: 2020-12-22

## 2020-12-21 RX ORDER — IBUPROFEN 200 MG
400 TABLET ORAL ONCE
Refills: 0 | Status: COMPLETED | OUTPATIENT
Start: 2020-12-21 | End: 2020-12-21

## 2020-12-21 RX ORDER — ENOXAPARIN SODIUM 100 MG/ML
40 INJECTION SUBCUTANEOUS DAILY
Refills: 0 | Status: DISCONTINUED | OUTPATIENT
Start: 2020-12-21 | End: 2020-12-21

## 2020-12-21 RX ORDER — ONDANSETRON 8 MG/1
4 TABLET, FILM COATED ORAL ONCE
Refills: 0 | Status: COMPLETED | OUTPATIENT
Start: 2020-12-21 | End: 2020-12-21

## 2020-12-21 RX ADMIN — PANTOPRAZOLE SODIUM 40 MILLIGRAM(S): 20 TABLET, DELAYED RELEASE ORAL at 06:37

## 2020-12-21 RX ADMIN — Medication 250 MILLIGRAM(S): at 09:34

## 2020-12-21 RX ADMIN — Medication 650 MILLIGRAM(S): at 13:08

## 2020-12-21 RX ADMIN — CEFEPIME 100 MILLIGRAM(S): 1 INJECTION, POWDER, FOR SOLUTION INTRAMUSCULAR; INTRAVENOUS at 05:40

## 2020-12-21 RX ADMIN — Medication 650 MILLIGRAM(S): at 21:55

## 2020-12-21 RX ADMIN — Medication 975 MILLIGRAM(S): at 01:59

## 2020-12-21 RX ADMIN — Medication 650 MILLIGRAM(S): at 03:54

## 2020-12-21 RX ADMIN — SIMVASTATIN 20 MILLIGRAM(S): 20 TABLET, FILM COATED ORAL at 21:56

## 2020-12-21 RX ADMIN — Medication 650 MILLIGRAM(S): at 14:08

## 2020-12-21 RX ADMIN — ENOXAPARIN SODIUM 40 MILLIGRAM(S): 100 INJECTION SUBCUTANEOUS at 13:08

## 2020-12-21 RX ADMIN — CEFEPIME 100 MILLIGRAM(S): 1 INJECTION, POWDER, FOR SOLUTION INTRAMUSCULAR; INTRAVENOUS at 13:08

## 2020-12-21 RX ADMIN — CHLORHEXIDINE GLUCONATE 1 APPLICATION(S): 213 SOLUTION TOPICAL at 18:42

## 2020-12-21 RX ADMIN — Medication 400 MILLIGRAM(S): at 14:48

## 2020-12-21 RX ADMIN — Medication 650 MILLIGRAM(S): at 04:54

## 2020-12-21 RX ADMIN — Medication 650 MILLIGRAM(S): at 22:45

## 2020-12-21 RX ADMIN — ONDANSETRON 4 MILLIGRAM(S): 8 TABLET, FILM COATED ORAL at 09:33

## 2020-12-21 RX ADMIN — Medication 400 MILLIGRAM(S): at 15:20

## 2020-12-21 RX ADMIN — SODIUM CHLORIDE 150 MILLILITER(S): 9 INJECTION INTRAMUSCULAR; INTRAVENOUS; SUBCUTANEOUS at 02:10

## 2020-12-21 RX ADMIN — Medication 250 MILLIGRAM(S): at 21:56

## 2020-12-21 RX ADMIN — SODIUM CHLORIDE 150 MILLILITER(S): 9 INJECTION INTRAMUSCULAR; INTRAVENOUS; SUBCUTANEOUS at 18:42

## 2020-12-21 NOTE — H&P ADULT - PROBLEM SELECTOR PLAN 3
resume simvastatin On chemo, last treatment appears to be 12/2. Follows with Dr. Cassie Bell. Per patient due to get chemo again next week?

## 2020-12-21 NOTE — CONSULT NOTE ADULT - ATTENDING COMMENTS
Huan Soares  Pager: [phone]. If no response or past 5 pm call [phone].
I have seen the patient, reviewed and discussed the case in detail with ANATOLY Troy and the oncology team, and agree with the assessment and plan of care as outlined in the note. Patient with breast cancer on active chemotherapy admitted with erythema and tenderness at site of mediport. Agree with antibiotics per ID, f/u cultures and removal of mediport by IR. Will follow with you.

## 2020-12-21 NOTE — PATIENT PROFILE ADULT - NSTRANSFERBELONGINGSRESP_GEN_A_NUR
Reason for Disposition   Caller has already spoken with another triager and has no further questions.    Protocols used: ST NO CONTACT OR DUPLICATE CONTACT CALL-A-AH     no

## 2020-12-21 NOTE — CONSULT NOTE ADULT - SUBJECTIVE AND OBJECTIVE BOX
INFECTIOUS DISEASE SERVICE INITIAL CONSULTATION NOTE    HPI:  60F with PMHx breast cancer on chemotherapy, HLD presenting with erythema, pain, fever to right chest port site x1 week. Pt admitted 11/15- for right chest port infection and on vancomycin IV, seen by ID and danny linezolid on discharge for 7 days. Pt notes compliance and some improvement in pain and erythema. However, about 2 weeks ago she began having returning of symptoms. Per allscripts note patient was seen by onc  and noted to have chest port erythema and pain and was prescribed keflex x7 days. Patient completed course about 4 days ago but still had pain and redness. She tried to call to get abx course extended but was unable to do so. Today she had a fever to 101.7 and decided to present to ED. Denies chest pain, SOB, abdominal pain, vomiting, diarrhea, urinary sx, HA, stiff neck. Believes she is due for chemo once again next week. Appears last chemo was done  per records.    In ED, low grade temp 100.3 and tachycardic to 120s. Lactate normal. Given tylenol with improvement, 2L IVF, cefepime 2g and vanc 1g (21 Dec 2020 00:39)      PAST MEDICAL & SURGICAL HISTORY:  Breast cancer, left    Obesity    Dyslipidemia    History of obesity  Endoscopic balloon procedure;  2018    History of augmentation of both breasts  2004        REVIEW OF SYSTEMS:  Constitutional: no weakness, no fevers, no chills  Dermatologic: no rash  Respiratory: no SOB, no cough  Cardiovascular: no chest pain, no palpitations  Gastrointestinal: no nausea, no vomiting, no diarrhea  Genitourinary: no dysuria, no urinary frequency, no hematuria, no urinary retention  Musculoskeletal:	no weakness, no joint swelling/pain  Neurological: no focal weakness or numbness  Endocrine: no polyuria, no polydipsia    ACTIVE ANTIMICROBIAL/ANTIBIOTIC MEDICATIONS:  cefepime   IVPB 2000 milliGRAM(s) IV Intermittent every 8 hours  vancomycin  IVPB 1000 milliGRAM(s) IV Intermittent every 12 hours      OTHER MEDICATIONS:  acetaminophen   Tablet .. 650 milliGRAM(s) Oral every 6 hours PRN  enoxaparin Injectable 40 milliGRAM(s) SubCutaneous daily  pantoprazole    Tablet 40 milliGRAM(s) Oral before breakfast  simvastatin 20 milliGRAM(s) Oral at bedtime  sodium chloride 0.9%. 1000 milliLiter(s) IV Continuous <Continuous>      ALLERGIES:  Allergies    No Known Allergies    Intolerances        SOCIAL HISTORY:      FAMILY HISTORY:  FAMILY HISTORY:  FH: lung cancer  mother        VITAL SIGNS:  ICU Vital Signs Last 24 Hrs  T(C): 36.8 (21 Dec 2020 10:03), Max: 38.5 (21 Dec 2020 03:44)  T(F): 98.2 (21 Dec 2020 10:03), Max: 101.3 (21 Dec 2020 03:44)  HR: 123 (21 Dec 2020 06:35) (116 - 130)  BP: 101/57 (21 Dec 2020 06:35) (101/57 - 142/71)  BP(mean): --  ABP: --  ABP(mean): --  RR: 18 (21 Dec 2020 06:35) (17 - 18)  SpO2: 95% (21 Dec 2020 06:35) (95% - 100%)      PHYSICAL EXAM:  Constitutional: WDWN  Head: NC/AT  Eyes: PERRLA, EOMI; anicteric slcera  ENMT: no rhinorrhea; no sinus tenderness on palpation; no oropharyngeal lesions, erythema, or exudates	  Neck: supple; no JVD or LAD  Respiratory: CTA B/L  Cardiovascular: +S1/S2, RRR; no appreciable murmurs  Gastrointestinal: soft, NT/ND; +BSx4, no HSM  Extremities: WWP; no clubbing, cyanosis, or edema  Vascular: 2+ radial, DP/PT pulses B/L  Dermatologic: skin warm and dry; no visible rashes or lesions  Neurologic: AAOx3; no focal deficits    LABS:                        8.1    7.56  )-----------( 190      ( 21 Dec 2020 08:52 )             25.9     12-21    141  |  108<H>  |  9   ----------------------------<  118<H>  3.5   |  22  |  0.75    Ca    8.7      21 Dec 2020 08:52    TPro  6.7  /  Alb  4.1  /  TBili  0.2  /  DBili  x   /  AST  20  /  ALT  27  /  AlkPhos  71  12-20    PT/INR - ( 20 Dec 2020 22:20 )   PT: 12.8 sec;   INR: 1.12 ratio         PTT - ( 20 Dec 2020 22:20 )  PTT:27.8 sec  Urinalysis Basic - ( 21 Dec 2020 02:16 )    Color: Colorless / Appearance: Clear / S.008 / pH: x  Gluc: x / Ketone: Negative  / Bili: Negative / Urobili: <2 mg/dL   Blood: x / Protein: Negative / Nitrite: Negative   Leuk Esterase: Negative / RBC: x / WBC x   Sq Epi: x / Non Sq Epi: x / Bacteria: x        MICROBIOLOGY:      RADIOLOGY & ADDITIONAL STUDIES: INFECTIOUS DISEASE SERVICE INITIAL CONSULTATION NOTE    HPI:  60F with PMHx breast cancer on chemotherapy, HLD presenting with erythema, pain, fever to right chest port site x1 week. Pt admitted 11/15- for right chest port infection and on vancomycin IV, seen by ID and rec'd linezolid on discharge for 7 days. Pt notes compliance and some improvement in pain and erythema. However, about 2 weeks ago she began having returning of symptoms. Per allscripts note patient was seen by onc  and noted to have chest port erythema and pain and was prescribed keflex x7 days. Patient completed course about 4 days ago but still had pain and redness. She tried to call to get abx course extended but was unable to do so. Today she had a fever to 101.7 and decided to present to ED. Denies chest pain, SOB, abdominal pain, vomiting, diarrhea, urinary sx, HA, stiff neck. Believes she is due for chemo once again next week. Appears last chemo was done  per records.    In ED, low grade temp 100.3 and tachycardic to 120s. Lactate normal. Given tylenol with improvement, 2L IVF, cefepime 2g and vanc 1g (21 Dec 2020 00:39)  ID consulted for infected port.   The patient reports that she is feeling much more tired than usual. Was having fevers yesterday.     PAST MEDICAL & SURGICAL HISTORY:  Breast cancer, left    Obesity    Dyslipidemia    History of obesity  Endoscopic balloon procedure;  2018    History of augmentation of both breasts  2004    REVIEW OF SYSTEMS:  Constitutional: +weakness, +fevers, no chills  Dermatologic: no rash  Respiratory: no SOB, no cough  Cardiovascular: no chest pain, no palpitations  Gastrointestinal: no nausea, no vomiting, no diarrhea  Genitourinary: no dysuria, no urinary frequency, no hematuria, no urinary retention  Musculoskeletal:	no weakness, no joint swelling/pain  Neurological: no focal weakness or numbness  Endocrine: no polyuria, no polydipsia    ACTIVE ANTIMICROBIAL/ANTIBIOTIC MEDICATIONS:  cefepime   IVPB 2000 milliGRAM(s) IV Intermittent every 8 hours  vancomycin  IVPB 1000 milliGRAM(s) IV Intermittent every 12 hours      OTHER MEDICATIONS:  acetaminophen   Tablet .. 650 milliGRAM(s) Oral every 6 hours PRN  enoxaparin Injectable 40 milliGRAM(s) SubCutaneous daily  pantoprazole    Tablet 40 milliGRAM(s) Oral before breakfast  simvastatin 20 milliGRAM(s) Oral at bedtime  sodium chloride 0.9%. 1000 milliLiter(s) IV Continuous <Continuous>      ALLERGIES:  Allergies    No Known Allergies    Intolerances    SOCIAL HISTORY: Former smoker, some wine, no drugs.     FAMILY HISTORY:  FH: lung cancer  mother        VITAL SIGNS:  ICU Vital Signs Last 24 Hrs  T(C): 36.8 (21 Dec 2020 10:03), Max: 38.5 (21 Dec 2020 03:44)  T(F): 98.2 (21 Dec 2020 10:03), Max: 101.3 (21 Dec 2020 03:44)  HR: 123 (21 Dec 2020 06:35) (116 - 130)  BP: 101/57 (21 Dec 2020 06:35) (101/57 - 142/71)  BP(mean): --  ABP: --  ABP(mean): --  RR: 18 (21 Dec 2020 06:35) (17 - 18)  SpO2: 95% (21 Dec 2020 06:35) (95% - 100%)      PHYSICAL EXAM:  Constitutional: Well appearing elderly woman lying in bed comfortably   Head: NC/AT  Eyes: anicteric sclera  ENMT: no rhinorrhea; no oropharyngeal lesions, erythema, or exudates	  Neck: supple; no JVD or LAD  Respiratory: CTA B/L  Cardiovascular: +S1/S2, RRR; no appreciable murmurs  Gastrointestinal: soft, NT/ND; +BS  Extremities: WWP; no clubbing, cyanosis, or edema  Vascular: chemoport on right chest with erythema and warmth   Neurologic: AAOx3; no focal deficits    LABS:                        8.1    7.56  )-----------( 190      ( 21 Dec 2020 08:52 )             25.9     12-21    141  |  108<H>  |  9   ----------------------------<  118<H>  3.5   |  22  |  0.75    Ca    8.7      21 Dec 2020 08:52    TPro  6.7  /  Alb  4.1  /  TBili  0.2  /  DBili  x   /  AST  20  /  ALT  27  /  AlkPhos  71  12-20    PT/INR - ( 20 Dec 2020 22:20 )   PT: 12.8 sec;   INR: 1.12 ratio         PTT - ( 20 Dec 2020 22:20 )  PTT:27.8 sec  Urinalysis Basic - ( 21 Dec 2020 02:16 )    Color: Colorless / Appearance: Clear / S.008 / pH: x  Gluc: x / Ketone: Negative  / Bili: Negative / Urobili: <2 mg/dL   Blood: x / Protein: Negative / Nitrite: Negative   Leuk Esterase: Negative / RBC: x / WBC x   Sq Epi: x / Non Sq Epi: x / Bacteria: x        MICROBIOLOGY:  COVID-19 PCR . (20 @ 22:29)   COVID-19 PCR: NotDetec:     RADIOLOGY & ADDITIONAL STUDIES:    < from: Xray Chest 1 View- PORTABLE-Urgent (Xray Chest 1 View- PORTABLE-Urgent .) (20 @ 23:36) >    IMPRESSION:  Clear lungs.    < end of copied text >   INFECTIOUS DISEASE SERVICE INITIAL CONSULTATION NOTE    HPI:  60F with PMHx breast cancer on chemotherapy, HLD presenting with erythema, pain, fever to right chest port site x1 week. Pt admitted 11/15- for right chest port infection and on vancomycin IV, seen by ID and rec'd linezolid on discharge for 7 days. Pt notes compliance and some improvement in pain and erythema. However, about 2 weeks ago she began having returning of symptoms. Per allscripts note patient was seen by onc  and noted to have chest port erythema and pain and was prescribed keflex x7 days. Patient completed course about 4 days ago but still had pain and redness. She tried to call to get abx course extended but was unable to do so. Today she had a fever to 101.7 and decided to present to ED. Denies chest pain, SOB, abdominal pain, vomiting, diarrhea, urinary sx, HA, stiff neck. Believes she is due for chemo once again next week. Appears last chemo was done  per records.    In ED, low grade temp 100.3 and tachycardic to 120s. Lactate normal. Given tylenol with improvement, 2L IVF, cefepime 2g and vanc 1g (21 Dec 2020 00:39)  ID consulted for infected port.   The patient reports that she is feeling much more tired than usual. Was having fevers yesterday. According to pt she had complete resolution of erythema after discharge. Pt says she received her chemo without issues but after she received iv fluids through the port, after that the she started experiencing erythema, pain at the port site. She had some improvement in port site erythema after keflex but not complete resolution an that's when she called for more abx but did not get a response back so came in to ER due to fever.       PAST MEDICAL & SURGICAL HISTORY:  Breast cancer, left    Obesity    Dyslipidemia    History of obesity  Endoscopic balloon procedure;  2018    History of augmentation of both breasts  2004      REVIEW OF SYSTEMS:  Constitutional: +weakness, +fevers, no chills  HEENT: no discharge, no sore throat, no nasal congestion  Respiratory: no SOB, no cough  Cardiovascular: no chest pain, no palpitations  Gastrointestinal: no nausea, no vomiting, no diarrhea  Genitourinary: no dysuria, no urinary frequency,  Musculoskeletal:	no joint swelling/pain  Neurological: no focal weakness or numbness  Endocrine: no polyuria, no polydipsia  Extremities: no swelling  Skin: per HPI.         ACTIVE ANTIMICROBIAL/ANTIBIOTIC MEDICATIONS:  cefepime   IVPB 2000 milliGRAM(s) IV Intermittent every 8 hours  vancomycin  IVPB 1000 milliGRAM(s) IV Intermittent every 12 hours      OTHER MEDICATIONS:  acetaminophen   Tablet .. 650 milliGRAM(s) Oral every 6 hours PRN  enoxaparin Injectable 40 milliGRAM(s) SubCutaneous daily  pantoprazole    Tablet 40 milliGRAM(s) Oral before breakfast  simvastatin 20 milliGRAM(s) Oral at bedtime  sodium chloride 0.9%. 1000 milliLiter(s) IV Continuous <Continuous>      ALLERGIES:  No Known Allergies      SOCIAL HISTORY: Former smoker, some wine, no drugs.       FAMILY HISTORY:  FH: lung cancer  mother        VITAL SIGNS:  ICU Vital Signs Last 24 Hrs  T(C): 36.8 (21 Dec 2020 10:03), Max: 38.5 (21 Dec 2020 03:44)  T(F): 98.2 (21 Dec 2020 10:03), Max: 101.3 (21 Dec 2020 03:44)  HR: 123 (21 Dec 2020 06:35) (116 - 130)  BP: 101/57 (21 Dec 2020 06:35) (101/57 - 142/71)  BP(mean): --  ABP: --  ABP(mean): --  RR: 18 (21 Dec 2020 06:35) (17 - 18)  SpO2: 95% (21 Dec 2020 06:35) (95% - 100%)      PHYSICAL EXAM:  Constitutional: Well appearing elderly woman lying in bed comfortably   Head: NC/AT  Eyes: anicteric sclera  ENMT: no rhinorrhea; no oropharyngeal exudates	  Neck: supple;  Respiratory: CTA B/L  Cardiovascular: +S1/S2, RRR; no appreciable murmurs  Gastrointestinal: soft, NT/ND; +BS  : No CVA tenderness  Extremities: WWP; no edema  Vascular: chemoport on right chest with erythema and warmth extending to the left shoulder, up the neck.   Neurologic: AAOx3; no focal deficits  Skin: No other rash.       LABS:                        8.1    7.56  )-----------( 190      ( 21 Dec 2020 08:52 )             25.9     12-21    141  |  108<H>  |  9   ----------------------------<  118<H>  3.5   |  22  |  0.75    Ca    8.7      21 Dec 2020 08:52    TPro  6.7  /  Alb  4.1  /  TBili  0.2  /  DBili  x   /  AST  20  /  ALT  27  /  AlkPhos  71  12-20    PT/INR - ( 20 Dec 2020 22:20 )   PT: 12.8 sec;   INR: 1.12 ratio         PTT - ( 20 Dec 2020 22:20 )  PTT:27.8 sec  Urinalysis Basic - ( 21 Dec 2020 02:16 )    Color: Colorless / Appearance: Clear / S.008 / pH: x  Gluc: x / Ketone: Negative  / Bili: Negative / Urobili: <2 mg/dL   Blood: x / Protein: Negative / Nitrite: Negative   Leuk Esterase: Negative / RBC: x / WBC x   Sq Epi: x / Non Sq Epi: x / Bacteria: x        MICROBIOLOGY:  COVID-19 PCR . (20 @ 22:29)   COVID-19 PCR: NotDetec:       RADIOLOGY & ADDITIONAL STUDIES: Imaging reviewed personally and interpretation as mentioned below.     < from: Xray Chest 1 View- PORTABLE-Urgent (Xray Chest 1 View- PORTABLE-Urgent .) (20 @ 23:36) >    IMPRESSION:  Clear lungs.

## 2020-12-21 NOTE — H&P ADULT - ASSESSMENT
60F with PMHx breast cancer on chemotherapy, HLD presenting with erythema, pain, fever to right chest port site x1 week. Suspected cellulitis/port infection

## 2020-12-21 NOTE — CONSULT NOTE ADULT - ASSESSMENT
Mrs Damon is a 60 year old woman with breast cancer who presented with erythema at her port site and fevers. Had a local port infection in November as well.  Febrile to 101.3. No leukocytosis.   Will need port removal.    Port infection  - Continue vancomycin 1g q 12 hours   - Check vancomycin trough prior to 4th dose   - Continue cefepime - change dose to 2g q 12 hours   - IR for port removal  - Follow up blood cultures  - Monitor for fevers  - Trend WBCs    Amalia Castillo, PGY-5  Infectious Disease Fellow   Pager: [phone]  After 5pm/weekends: [phone]   Mrs Damon is a 60 year old woman with breast cancer who presented with erythema at her port site and fevers. Had a local port infection in November as well.  Febrile to 101.3. No leukocytosis.   Will need port removal.    Overall sepsis, fever, tachy, secondary to port infection with surrounding cellulitis.       Plan:   - Continue vancomycin 1g q 12 hours   - monitor vancomycin trough and creatinine to avoid nephrotoxicity and ensure efficacy   - Continue cefepime - change dose to 2g q 12 hours   - IR for port removal, send cx from port   - Follow up blood cultures  - Trend WBCs      Amalia Castillo, PGY-5  Infectious Disease Fellow   Pager: [phone]  After 5pm/weekends: [phone]

## 2020-12-21 NOTE — H&P ADULT - PROBLEM SELECTOR PROBLEM 3
Dyslipidemia Malignant neoplasm of left female breast, unspecified estrogen receptor status, unspecified site of breast

## 2020-12-21 NOTE — H&P ADULT - NSHPLABSRESULTS_GEN_ALL_CORE
Director, Alesia(Attending)
I have personally reviewed this patient's labs below:                        8.9    9.37  )-----------( 207      ( 20 Dec 2020 22:20 )             27.5     12-20-20 @ 22:20    140  |  104  |  12             --------------------------< 104<H>     3.6  |  23  | 0.81    eGFR AA: 90  eGFR N-AA: 78    Calcium: 9.4  Phosphorus: --  Magnesium: --    AST: 20    ALT: 27  AlkPhos: 71  Protein: 6.7  Albumin: 4.1  TBili: 0.2  D-Bili: --    VBG - ( 20 Dec 2020 22:20 )  pH: 7.44  /  pCO2: 38    /  pO2: 66    / HCO3: 26    / Base Excess: 1.4   /  SvO2: 93.1  / Lactate: 1.4            I have personally reviewed this patient's EKG and my independent interpretation is sinus tachycardia @123bpm with nonspecific T wave flattening similar compared to prior EKG    I have personally reviewed this patient's CXR and my independent interpretation is clear lungs

## 2020-12-21 NOTE — CHART NOTE - NSCHARTNOTEFT_GEN_A_CORE
Pre-Interventional Radiology Procedure Note    60y    Female    Procedure: Port Removal    Diagnosis/Indication: Patient is a 60y old  Female who presents with a chief complaint of erythema, pain to right chest port site x1 week (21 Dec 2020 16:54)      Interventional Radiology Attending Physician: Dr. Thompson    Ordering Attending Physician: Dr. Laird    PAST MEDICAL & SURGICAL HISTORY:  Breast cancer, left    Obesity    Dyslipidemia    History of obesity  Endoscopic balloon procedure;  05/2018    History of augmentation of both breasts  02/2004         CBC Full  -  ( 21 Dec 2020 08:52 )  WBC Count : 7.56 K/uL  RBC Count : 2.55 M/uL  Hemoglobin : 8.1 g/dL  Hematocrit : 25.9 %  Platelet Count - Automated : 190 K/uL  Mean Cell Volume : 101.6 fL  Mean Cell Hemoglobin : 31.8 pg  Mean Cell Hemoglobin Concentration : 31.3 gm/dL  Auto Neutrophil # : x  Auto Lymphocyte # : x  Auto Monocyte # : x  Auto Eosinophil # : x  Auto Basophil # : x  Auto Neutrophil % : x  Auto Lymphocyte % : x  Auto Monocyte % : x  Auto Eosinophil % : x  Auto Basophil % : x    12-21    141  |  108<H>  |  9   ----------------------------<  118<H>  3.5   |  22  |  0.75    Ca    8.7      21 Dec 2020 08:52    TPro  6.7  /  Alb  4.1  /  TBili  0.2  /  DBili  x   /  AST  20  /  ALT  27  /  AlkPhos  71  12-20    PT/INR - ( 20 Dec 2020 22:20 )   PT: 12.8 sec;   INR: 1.12 ratio         PTT - ( 20 Dec 2020 22:20 )  PTT:27.8 sec

## 2020-12-21 NOTE — CONSULT NOTE ADULT - REASON FOR ADMISSION
erythema, pain to right chest port site x1 week

## 2020-12-21 NOTE — CONSULT NOTE ADULT - SUBJECTIVE AND OBJECTIVE BOX
60F with PMHx breast cancer on chemotherapy, HLD presenting with erythema, pain, fever to right chest port site x1 week. Pt admitted 11/15-11/18 for right chest port infection and on vancomycin IV, seen by ID and milan'farhana linezolid on discharge for 7 days. Pt notes compliance and some improvement in pain and erythema. However, about 2 weeks ago she began having returning of symptoms. Per allscripts note patient was seen by onc 12/9 and noted to have chest port erythema and pain and was prescribed keflex x7 days. Patient completed course about 4 days ago but still had pain and redness.

## 2020-12-21 NOTE — H&P ADULT - NSHPREVIEWOFSYSTEMS_GEN_ALL_CORE
ROS:    Constitutional: [x ] fevers [ ] chills   HEENT: [ ] dry eyes [ ] eye irritation  CV: [ ] chest pain [ ] orthopnea [ ] palpitations [ ] murmur  Resp: [ ] cough [ ] shortness of breath [ ] dyspnea   GI: [ ] nausea [ ] vomiting [ ] diarrhea [ ] constipation [ ] abd pain   : [ ] dysuria [ ] nocturia [ ] hematuria [ ] increased urinary frequency  Musculoskeletal: [ ] back pain [ ] myalgias [ ] arthralgias [ ] fracture  Skin: [ ] rash [ ] itch [x] redness to port site  Neurological: [ ] headache [ ] dizziness [ ] syncope [ ] weakness [ ] numbness  Psychiatric: [ ] anxiety [ ] depression  Endocrine: [ ] diabetes [ ] thyroid problem  Hematologic/Lymphatic: [ ] anemia [ ] bleeding problem  Allergic/Immunologic: [ ] itchy eyes [ ] nasal discharge [ ] hives [ ] angioedema  [x ] All other systems negative

## 2020-12-21 NOTE — CONSULT NOTE ADULT - ASSESSMENT
60F with PMHx breast cancer on chemotherapy, HLD presenting with erythema, pain, fever to right chest port site x1 week. Pt admitted 11/15-11/18 for right chest port infection and on vancomycin IV, seen by ID and rec'd linezolid on discharge for 7 days. Pt notes compliance and some improvement in pain and erythema. However, about 2 weeks ago she began having returning of symptoms. Per allscripts note patient was seen by onc 12/9 and noted to have chest port erythema and pain and was prescribed keflex x7 days. Patient completed course about 4 days ago but still had pain and redness. IR consulted for port removal per ID and hem/onc recs.    - Reviewed chart and imaging  - Place IR procedure order under Dr. Thompson, Chest port removal will be performed on 12/22  - NPO MN  - Make sure CBC, bmp, coags are up to date  - Hold lovenox prior to procedure

## 2020-12-21 NOTE — CONSULT NOTE ADULT - SUBJECTIVE AND OBJECTIVE BOX
Patient is a 60y old  Female who presents with a chief complaint of erythema, pain to right chest port site x1 week (21 Dec 2020 14:02)      HPI:  60F with PMHx breast cancer on chemotherapy, HLD presenting with erythema, pain, fever to right chest port site x1 week. Pt admitted 11/15-11/18 for right chest port infection and on vancomycin IV, seen by ID and milan'farhana linezolid on discharge for 7 days. Pt notes compliance and some improvement in pain and erythema. However, about 2 weeks ago she began having returning of symptoms. Per allscripts note patient was seen by onc 12/9 and noted to have chest port erythema and pain and was prescribed keflex x7 days. Patient completed course about 4 days ago but still had pain and redness. She tried to call to get abx course extended but was unable to do so. Today she had a fever to 101.7 and decided to present to ED. Denies chest pain, SOB, abdominal pain, vomiting, diarrhea, urinary sx, HA, stiff neck. Believes she is due for chemo once again next week. Appears last chemo was done 12/2 per records.    In ED, low grade temp 100.3 and tachycardic to 120s. Lactate normal. Given tylenol with improvement, 2L IVF, cefepime 2g and vanc 1g (21 Dec 2020 00:39)       Oncologic History:      ROS: as above     PAST MEDICAL & SURGICAL HISTORY:  Breast cancer, left    Obesity    Dyslipidemia    History of obesity  Endoscopic balloon procedure;  05/2018    History of augmentation of both breasts  02/2004        SOCIAL HISTORY:    FAMILY HISTORY:  FH: lung cancer  mother        MEDICATIONS  (STANDING):  cefepime   IVPB 2000 milliGRAM(s) IV Intermittent every 8 hours  enoxaparin Injectable 40 milliGRAM(s) SubCutaneous daily  ibuprofen  Tablet. 400 milliGRAM(s) Oral once  pantoprazole    Tablet 40 milliGRAM(s) Oral before breakfast  simvastatin 20 milliGRAM(s) Oral at bedtime  sodium chloride 0.9%. 1000 milliLiter(s) (150 mL/Hr) IV Continuous <Continuous>  vancomycin  IVPB 1000 milliGRAM(s) IV Intermittent every 12 hours    MEDICATIONS  (PRN):  acetaminophen   Tablet .. 650 milliGRAM(s) Oral every 6 hours PRN Temp greater or equal to 38C (100.4F)      Allergies    No Known Allergies    Intolerances        Vital Signs Last 24 Hrs  T(C): 39.4 (21 Dec 2020 14:15), Max: 39.4 (21 Dec 2020 14:15)  T(F): 102.9 (21 Dec 2020 14:15), Max: 102.9 (21 Dec 2020 14:15)  HR: 117 (21 Dec 2020 13:03) (116 - 130)  BP: 123/71 (21 Dec 2020 13:03) (101/57 - 142/71)  BP(mean): --  RR: 18 (21 Dec 2020 06:35) (17 - 18)  SpO2: 95% (21 Dec 2020 13:03) (95% - 100%)    PHYSICAL EXAM  General: adult in NAD  HEENT: clear oropharynx, anicteric sclera, pink conjunctiva  Neck: supple  CV: normal S1/S2 with no murmur rubs or gallops  Lungs: positive air movement b/l ant lungs, clear to auscultation, no wheezes, no rales  Abdomen: soft non-tender non-distended, no hepatosplenomegaly  Ext: no clubbing cyanosis or edema  Skin: no rashes and no petechiae  Neuro: alert and oriented X 3, none focal    LABS:                          8.1    7.56  )-----------( 190      ( 21 Dec 2020 08:52 )             25.9         Mean Cell Volume : 101.6 fL  Mean Cell Hemoglobin : 31.8 pg  Mean Cell Hemoglobin Concentration : 31.3 gm/dL  Auto Neutrophil # : x  Auto Lymphocyte # : x  Auto Monocyte # : x  Auto Eosinophil # : x  Auto Basophil # : x  Auto Neutrophil % : x  Auto Lymphocyte % : x  Auto Monocyte % : x  Auto Eosinophil % : x  Auto Basophil % : x      Serial CBC's  12-21 @ 08:52  Hct-25.9 / Hgb-8.1 / Plat-190 / RBC-2.55 / WBC-7.56  Serial CBC's  12-20 @ 22:20  Hct-27.5 / Hgb-8.9 / Plat-207 / RBC-2.83 / WBC-9.37      12-21    141  |  108<H>  |  9   ----------------------------<  118<H>  3.5   |  22  |  0.75    Ca    8.7      21 Dec 2020 08:52    TPro  6.7  /  Alb  4.1  /  TBili  0.2  /  DBili  x   /  AST  20  /  ALT  27  /  AlkPhos  71  12-20      PT/INR - ( 20 Dec 2020 22:20 )   PT: 12.8 sec;   INR: 1.12 ratio         PTT - ( 20 Dec 2020 22:20 )  PTT:27.8 sec                RADIOLOGY & ADDITIONAL STUDIES:     Patient is a 60y old  Female who presents with a chief complaint of erythema, pain to right chest port site x1 week (21 Dec 2020 14:02)      HPI:  60F with PMHx breast cancer on chemotherapy, HLD presenting with erythema, pain, fever to right chest port site x1 week. Pt admitted 11/15-11/18 for right chest port infection and on vancomycin IV, seen by ID and milan'd linezolid on discharge for 7 days. Pt notes compliance and some improvement in pain and erythema. However, about 2 weeks ago she began having returning of symptoms. Per allscripts note patient was seen by onc 12/9 and noted to have chest port erythema and pain and was prescribed keflex x7 days. Patient completed course about 4 days ago but still had pain and redness. She tried to call to get abx course extended but was unable to do so. Today she had a fever to 101.7 and decided to present to ED. Denies chest pain, SOB, abdominal pain, vomiting, diarrhea, urinary sx, HA, stiff neck. Believes she is due for chemo once again next week. Appears last chemo was done 12/2 per records.    In ED, low grade temp 100.3 and tachycardic to 120s. Lactate normal. Given tylenol with improvement, 2L IVF, cefepime 2g and vanc 1g (21 Dec 2020 00:39)       Oncologic History:  Initially presented on breast imaging in 8/19/2020 with BIRADS 5 showing new irregular mass with calcifications and nipple retraction in the left retroareolar location on mammography, corresponding to a mass in the left breast 6:00 location 2 cm from the nipple on the concurrent breast ultrasound. Ultrasound-guided core biopsy was recommended for diagnosis. She had a left, 6 o'clock, 2 cm FN, core biopsy on on 8/28/2020 which showed Invasive poorly differentiated ductal carcinoma with areas of micropapillary features. Patient has clinically T2 N1 Mx poorly differentiated invasive ductal carcinoma ER/MS positive and HER-2/lazara POSITIVE.  She was started neoadjuvant TCHP with onpro on 9/30/2020. She tested positive for Covid by PCR on 10/30/2020. A repeat test at 2 weeks was positive again on 11/13/2020. She was admitted on 11/15/2020 with port site infection. She was treated with IV antibiotics and was discharged on Zyvox. Subsequently Covid PCR was done x2 and was negative. She received cycle 3 on 12/2/2020. Plan is to undergo surgery after 6 cycles. +/- RT. Candidate for endocrine therapy.      ROS: as above     PAST MEDICAL & SURGICAL HISTORY:  Breast cancer, left    Obesity    Dyslipidemia    History of obesity  Endoscopic balloon procedure;  05/2018    History of augmentation of both breasts  02/2004        SOCIAL HISTORY:    FAMILY HISTORY:  FH: lung cancer  mother        MEDICATIONS  (STANDING):  cefepime   IVPB 2000 milliGRAM(s) IV Intermittent every 8 hours  enoxaparin Injectable 40 milliGRAM(s) SubCutaneous daily  ibuprofen  Tablet. 400 milliGRAM(s) Oral once  pantoprazole    Tablet 40 milliGRAM(s) Oral before breakfast  simvastatin 20 milliGRAM(s) Oral at bedtime  sodium chloride 0.9%. 1000 milliLiter(s) (150 mL/Hr) IV Continuous <Continuous>  vancomycin  IVPB 1000 milliGRAM(s) IV Intermittent every 12 hours    MEDICATIONS  (PRN):  acetaminophen   Tablet .. 650 milliGRAM(s) Oral every 6 hours PRN Temp greater or equal to 38C (100.4F)      Allergies    No Known Allergies    Intolerances        Vital Signs Last 24 Hrs  T(C): 39.4 (21 Dec 2020 14:15), Max: 39.4 (21 Dec 2020 14:15)  T(F): 102.9 (21 Dec 2020 14:15), Max: 102.9 (21 Dec 2020 14:15)  HR: 117 (21 Dec 2020 13:03) (116 - 130)  BP: 123/71 (21 Dec 2020 13:03) (101/57 - 142/71)  BP(mean): --  RR: 18 (21 Dec 2020 06:35) (17 - 18)  SpO2: 95% (21 Dec 2020 13:03) (95% - 100%)    PHYSICAL EXAM  General: adult in NAD  HEENT: clear oropharynx, anicteric sclera, pink conjunctiva  Neck: supple  CV: normal S1/S2 with no murmur rubs or gallops  Lungs: positive air movement b/l ant lungs, clear to auscultation, no wheezes, no rales  Abdomen: soft non-tender non-distended, no hepatosplenomegaly  Ext: no clubbing cyanosis or edema  Skin: no rashes and no petechiae  Neuro: alert and oriented X 3, none focal    LABS:                          8.1    7.56  )-----------( 190      ( 21 Dec 2020 08:52 )             25.9         Mean Cell Volume : 101.6 fL  Mean Cell Hemoglobin : 31.8 pg  Mean Cell Hemoglobin Concentration : 31.3 gm/dL  Auto Neutrophil # : x  Auto Lymphocyte # : x  Auto Monocyte # : x  Auto Eosinophil # : x  Auto Basophil # : x  Auto Neutrophil % : x  Auto Lymphocyte % : x  Auto Monocyte % : x  Auto Eosinophil % : x  Auto Basophil % : x      Serial CBC's  12-21 @ 08:52  Hct-25.9 / Hgb-8.1 / Plat-190 / RBC-2.55 / WBC-7.56  Serial CBC's  12-20 @ 22:20  Hct-27.5 / Hgb-8.9 / Plat-207 / RBC-2.83 / WBC-9.37      12-21    141  |  108<H>  |  9   ----------------------------<  118<H>  3.5   |  22  |  0.75    Ca    8.7      21 Dec 2020 08:52    TPro  6.7  /  Alb  4.1  /  TBili  0.2  /  DBili  x   /  AST  20  /  ALT  27  /  AlkPhos  71  12-20      PT/INR - ( 20 Dec 2020 22:20 )   PT: 12.8 sec;   INR: 1.12 ratio         PTT - ( 20 Dec 2020 22:20 )  PTT:27.8 sec                RADIOLOGY & ADDITIONAL STUDIES:

## 2020-12-21 NOTE — H&P ADULT - PROBLEM SELECTOR PROBLEM 2
Malignant neoplasm of left female breast, unspecified estrogen receptor status, unspecified site of breast Sepsis, due to unspecified organism, unspecified whether acute organ dysfunction present

## 2020-12-21 NOTE — H&P ADULT - PROBLEM SELECTOR PLAN 1
Fever 101.7, tachycardic to 120s. Given cefepime and vanc, IVF in ED  Suspect cellulitis over right chest wall port area, TTP, erythema  -after 2L NS finished, start NS gtt @150cc/hr and assess HR response  -tylenol PRN  -vanc 1g q12 and check trough before 4th dose  -cefepime 2g q8hrs for now pending ID consult  -ID consult in AM  -depending on discussion with ID and oncology - patient may require port replacement given this appears to be a recurrent infection (last admission in November on vanc and discharged on linezolid). On last admission port was left in place  -f/u ESR, CRP  -discuss with ID if further imaging such as TTE or CT rec'd though no fluctuance on exam, does not appear to have palpable abscess but rather cellulitis  -f/u BCx x2 - one was sent from port (on prior admission there was no growth)  -f/u UA, UCx  -CXR clear lungs

## 2020-12-21 NOTE — PROGRESS NOTE ADULT - PROBLEM SELECTOR PLAN 1
On admission Fever 101.7, tachycardic to 120s. Given cefepime and vanc, IVF in ED  Suspect cellulitis over right chest wall port area, TTP, erythema  Continue sepsis, f/u cultures, ID consulted

## 2020-12-21 NOTE — H&P ADULT - HISTORY OF PRESENT ILLNESS
60F with PMHx breast cancer on chemotherapy, HLD presenting with erythema, pain, fever to right chest port site x1 week. Pt admitted 11/15-11/18 for right chest port infection and on vancomycin IV, seen by ID and milan'farhana linezolid on discharge for 7 days. Pt notes compliance and some improvement in pain and erythema. However, about 2 weeks ago she began having returning of symptoms. Per allscripts note patient was seen by onc 12/9 and noted to have chest port erythema and pain and was prescribed keflex x7 days. Patient completed course about 4 days ago but still had pain and redness. She tried to call to get abx course extended but was unable to do so. Today she had a fever to 101.7 and decided to present to ED. Denies chest pain, SOB, abdominal pain, vomiting, diarrhea, urinary sx, HA, stiff neck. Believes she is due for chemo once again next week. Appears last chemo was done 12/2 per records.    In ED, low grade temp 100.3 and tachycardic to 120s. Lactate normal. Given tylenol with improvement, 2L IVF, cefepime 2g and vanc 1g

## 2020-12-21 NOTE — H&P ADULT - PROBLEM SELECTOR PLAN 2
On chemo, last treatment appears to be 12/2. Follows with Dr. Cassie Bell. Per patient due to get chemo again next week? Fever, tachycardia in ED. Given cefepime, vanc, tylenol, IVF  -likely cellulitis  -continue vanc/cefepime for now, ID consult in AM, f/u BCx x2 and UCx  -CXR clear

## 2020-12-21 NOTE — CONSULT NOTE ADULT - ASSESSMENT
60F with PMHx breast cancer on chemotherapy, HLD presenting with erythema, pain, fever to right chest port site x1 week.     -  -  -  -  -  -Patient to followup with Dr. Cassie Bell ([facility]) upon discharge  -C/w Supportive care, pain control, Nutrition, PT, DVT ppx  -Oncology will continue to follow with you      Case d/w Dr. Pj LEDBETTER  Oncology Physician Assistant  Shirley THOMAS/[facility]  Pager [phone]    If after 5pm or weekends please page On-call Oncology Fellow   60F with PMHx breast cancer on chemotherapy, HLD presenting with erythema, pain, fever to right chest port site x1 week.     -Infectious workup ongoing  -On Vanc/Cefepime  -Appreciate ID consult recs  -Plan for port removal  -Rest of care as primary team  -Patient to followup with Dr. Cassie Bell ([facility]) upon discharge  -C/w Supportive care, pain control, Nutrition, PT, DVT ppx  -Oncology will continue to follow with you      Case d/w Dr. Pj LEDBETTER  Oncology Physician Assistant  [facility] THOMAS/[facility]  Pager [phone]    If after 5pm or weekends please page On-call Oncology Fellow

## 2020-12-22 LAB
ANION GAP SERPL CALC-SCNC: 11 MMOL/L — SIGNIFICANT CHANGE UP (ref 7–14)
BASOPHILS # BLD AUTO: 0.01 K/UL — SIGNIFICANT CHANGE UP (ref 0–0.2)
BASOPHILS NFR BLD AUTO: 0.1 % — SIGNIFICANT CHANGE UP (ref 0–2)
BLD GP AB SCN SERPL QL: NEGATIVE — SIGNIFICANT CHANGE UP
BUN SERPL-MCNC: 7 MG/DL — SIGNIFICANT CHANGE UP (ref 7–23)
CALCIUM SERPL-MCNC: 8.7 MG/DL — SIGNIFICANT CHANGE UP (ref 8.4–10.5)
CHLORIDE SERPL-SCNC: 105 MMOL/L — SIGNIFICANT CHANGE UP (ref 98–107)
CO2 SERPL-SCNC: 24 MMOL/L — SIGNIFICANT CHANGE UP (ref 22–31)
CREAT SERPL-MCNC: 0.72 MG/DL — SIGNIFICANT CHANGE UP (ref 0.5–1.3)
CULTURE RESULTS: SIGNIFICANT CHANGE UP
EOSINOPHIL # BLD AUTO: 0 K/UL — SIGNIFICANT CHANGE UP (ref 0–0.5)
EOSINOPHIL NFR BLD AUTO: 0 % — SIGNIFICANT CHANGE UP (ref 0–6)
GLUCOSE SERPL-MCNC: 117 MG/DL — HIGH (ref 70–99)
GRAM STN FLD: SIGNIFICANT CHANGE UP
GRAM STN FLD: SIGNIFICANT CHANGE UP
HCT VFR BLD CALC: 25 % — LOW (ref 34.5–45)
HGB BLD-MCNC: 8.1 G/DL — LOW (ref 11.5–15.5)
IANC: 5.88 K/UL — SIGNIFICANT CHANGE UP (ref 1.5–8.5)
IMM GRANULOCYTES NFR BLD AUTO: 0.6 % — SIGNIFICANT CHANGE UP (ref 0–1.5)
INR BLD: 1.37 RATIO — HIGH (ref 0.88–1.17)
LYMPHOCYTES # BLD AUTO: 0.35 K/UL — LOW (ref 1–3.3)
LYMPHOCYTES # BLD AUTO: 5.2 % — LOW (ref 13–44)
MAGNESIUM SERPL-MCNC: 1.5 MG/DL — LOW (ref 1.6–2.6)
MCHC RBC-ENTMCNC: 32 PG — SIGNIFICANT CHANGE UP (ref 27–34)
MCHC RBC-ENTMCNC: 32.4 GM/DL — SIGNIFICANT CHANGE UP (ref 32–36)
MCV RBC AUTO: 98.8 FL — SIGNIFICANT CHANGE UP (ref 80–100)
MONOCYTES # BLD AUTO: 0.43 K/UL — SIGNIFICANT CHANGE UP (ref 0–0.9)
MONOCYTES NFR BLD AUTO: 6.4 % — SIGNIFICANT CHANGE UP (ref 2–14)
NEUTROPHILS # BLD AUTO: 5.88 K/UL — SIGNIFICANT CHANGE UP (ref 1.8–7.4)
NEUTROPHILS NFR BLD AUTO: 87.7 % — HIGH (ref 43–77)
NRBC # BLD: 0 /100 WBCS — SIGNIFICANT CHANGE UP
NRBC # FLD: 0 K/UL — SIGNIFICANT CHANGE UP
PHOSPHATE SERPL-MCNC: 1.9 MG/DL — LOW (ref 2.5–4.5)
PLATELET # BLD AUTO: 194 K/UL — SIGNIFICANT CHANGE UP (ref 150–400)
POTASSIUM SERPL-MCNC: 3.2 MMOL/L — LOW (ref 3.5–5.3)
POTASSIUM SERPL-SCNC: 3.2 MMOL/L — LOW (ref 3.5–5.3)
PROTHROM AB SERPL-ACNC: 15.4 SEC — HIGH (ref 9.8–13.1)
RBC # BLD: 2.53 M/UL — LOW (ref 3.8–5.2)
RBC # FLD: 17 % — HIGH (ref 10.3–14.5)
RH IG SCN BLD-IMP: POSITIVE — SIGNIFICANT CHANGE UP
SODIUM SERPL-SCNC: 140 MMOL/L — SIGNIFICANT CHANGE UP (ref 135–145)
SPECIMEN SOURCE: SIGNIFICANT CHANGE UP
VANCOMYCIN FLD-MCNC: 9.1 UG/ML — SIGNIFICANT CHANGE UP
WBC # BLD: 6.71 K/UL — SIGNIFICANT CHANGE UP (ref 3.8–10.5)
WBC # FLD AUTO: 6.71 K/UL — SIGNIFICANT CHANGE UP (ref 3.8–10.5)

## 2020-12-22 PROCEDURE — 77001 FLUOROGUIDE FOR VEIN DEVICE: CPT | Mod: 26,GC

## 2020-12-22 PROCEDURE — 99233 SBSQ HOSP IP/OBS HIGH 50: CPT

## 2020-12-22 PROCEDURE — 36590 REMOVAL TUNNELED CV CATH: CPT

## 2020-12-22 RX ORDER — POTASSIUM CHLORIDE 20 MEQ
10 PACKET (EA) ORAL
Refills: 0 | Status: COMPLETED | OUTPATIENT
Start: 2020-12-22 | End: 2020-12-22

## 2020-12-22 RX ORDER — POTASSIUM PHOSPHATE, MONOBASIC POTASSIUM PHOSPHATE, DIBASIC 236; 224 MG/ML; MG/ML
15 INJECTION, SOLUTION INTRAVENOUS ONCE
Refills: 0 | Status: COMPLETED | OUTPATIENT
Start: 2020-12-22 | End: 2020-12-22

## 2020-12-22 RX ORDER — MAGNESIUM SULFATE 500 MG/ML
1 VIAL (ML) INJECTION ONCE
Refills: 0 | Status: COMPLETED | OUTPATIENT
Start: 2020-12-22 | End: 2020-12-22

## 2020-12-22 RX ORDER — CEFAZOLIN SODIUM 1 G
2000 VIAL (EA) INJECTION EVERY 8 HOURS
Refills: 0 | Status: DISCONTINUED | OUTPATIENT
Start: 2020-12-22 | End: 2020-12-28

## 2020-12-22 RX ORDER — ACETAMINOPHEN 500 MG
650 TABLET ORAL EVERY 6 HOURS
Refills: 0 | Status: DISCONTINUED | OUTPATIENT
Start: 2020-12-22 | End: 2020-12-28

## 2020-12-22 RX ADMIN — Medication 100 MILLIEQUIVALENT(S): at 09:17

## 2020-12-22 RX ADMIN — Medication 100 GRAM(S): at 09:17

## 2020-12-22 RX ADMIN — PANTOPRAZOLE SODIUM 40 MILLIGRAM(S): 20 TABLET, DELAYED RELEASE ORAL at 08:35

## 2020-12-22 RX ADMIN — POTASSIUM PHOSPHATE, MONOBASIC POTASSIUM PHOSPHATE, DIBASIC 62.5 MILLIMOLE(S): 236; 224 INJECTION, SOLUTION INTRAVENOUS at 17:34

## 2020-12-22 RX ADMIN — Medication 100 MILLIEQUIVALENT(S): at 10:11

## 2020-12-22 RX ADMIN — Medication 100 MILLIEQUIVALENT(S): at 14:47

## 2020-12-22 RX ADMIN — SIMVASTATIN 20 MILLIGRAM(S): 20 TABLET, FILM COATED ORAL at 22:20

## 2020-12-22 RX ADMIN — Medication 650 MILLIGRAM(S): at 10:50

## 2020-12-22 RX ADMIN — Medication 100 MILLIGRAM(S): at 14:47

## 2020-12-22 RX ADMIN — Medication 100 MILLIGRAM(S): at 22:20

## 2020-12-22 RX ADMIN — SODIUM CHLORIDE 150 MILLILITER(S): 9 INJECTION INTRAMUSCULAR; INTRAVENOUS; SUBCUTANEOUS at 14:46

## 2020-12-22 RX ADMIN — CEFEPIME 100 MILLIGRAM(S): 1 INJECTION, POWDER, FOR SOLUTION INTRAMUSCULAR; INTRAVENOUS at 00:18

## 2020-12-22 RX ADMIN — CHLORHEXIDINE GLUCONATE 1 APPLICATION(S): 213 SOLUTION TOPICAL at 14:47

## 2020-12-22 RX ADMIN — Medication 650 MILLIGRAM(S): at 18:30

## 2020-12-22 RX ADMIN — Medication 650 MILLIGRAM(S): at 10:11

## 2020-12-22 RX ADMIN — Medication 650 MILLIGRAM(S): at 17:34

## 2020-12-22 RX ADMIN — SODIUM CHLORIDE 150 MILLILITER(S): 9 INJECTION INTRAMUSCULAR; INTRAVENOUS; SUBCUTANEOUS at 22:20

## 2020-12-22 NOTE — PROCEDURE NOTE - PROCEDURE FINDINGS AND DETAILS
Right chest port removed. Minimal serous fluid in the port pocket with no evidence of gross infection. Fluid from port reservoir aspirated and sent for culture.

## 2020-12-22 NOTE — PROVIDER CONTACT NOTE (OTHER) - ACTION/TREATMENT ORDERED:
continue IV abx, IR to remove R chest wall port today
Tylenol was given as ordered, Ice packs to axilla as ordered, will continue to monitor
Continue to monitor, will order motrin.

## 2020-12-22 NOTE — PROVIDER CONTACT NOTE (CRITICAL VALUE NOTIFICATION) - ASSESSMENT
Aerobic bottle gram positive cocci in clusters
Pt is alert and oriented x4, and has been spiking ongoing temps since admission

## 2020-12-22 NOTE — PROVIDER CONTACT NOTE (CRITICAL VALUE NOTIFICATION) - RECOMMENDATIONS
Continue to monitor. On abx
Provider contacted and made aware. Pt is already on Vancomycin and Cefepime IV.

## 2020-12-22 NOTE — PROVIDER CONTACT NOTE (OTHER) - RECOMMENDATIONS
Continue to monitor, will order motrin.
continue IV abx, IR to remove R chest wall port today
Provider was notified and made aware, possible port removal tomorrow, Give Tylenol as ordered, continue antibiotics as ordered.

## 2020-12-22 NOTE — PROVIDER CONTACT NOTE (OTHER) - BACKGROUND
Possible port infection, breast CA, last chemo 3 weeks ago
breast ca, bacteremia
Pt with a past medical hx of Breast ca and has a chest port for chemotherapy. The chest port is possibly infected.

## 2020-12-22 NOTE — PROVIDER CONTACT NOTE (CRITICAL VALUE NOTIFICATION) - SITUATION
Received call from lab stating patient has positive Blood Cultures in Anaerobic bottle obtained 12/20/20. Pt has Gram positive cocci in clusters
Aerobic bottle gram positive cocci in clusters

## 2020-12-22 NOTE — PROVIDER CONTACT NOTE (CRITICAL VALUE NOTIFICATION) - ACTION/TREATMENT ORDERED:
Continue to monitor. On abx
No new orders were given at this time; continue with present antibiotic regimen, will continue to monitor

## 2020-12-22 NOTE — PROVIDER CONTACT NOTE (OTHER) - SITUATION
Pt presents with a temp of 102.9, rechecked 102.6
Patient with 102.9 temp s/p tylenol 1 hr ago
febrile 100.5. Tylenol given @ 1015 for headache.

## 2020-12-22 NOTE — PROVIDER CONTACT NOTE (CRITICAL VALUE NOTIFICATION) - NS PROVIDER READ BACK TO LAB
yes Rhombic Flap Text: The defect edges were debeveled with a #15 scalpel blade.  Given the location of the defect and the proximity to free margins a rhombic flap was deemed most appropriate.  Using a sterile surgical marker, an appropriate rhombic flap was drawn incorporating the defect.    The area thus outlined was incised deep to adipose tissue with a #15 scalpel blade.  The skin margins were undermined to an appropriate distance in all directions utilizing iris scissors.

## 2020-12-22 NOTE — SBIRT NOTE ADULT - NSSBIRTALCPOSREINDET_GEN_A_CORE
Pt AAOx3, euthymic mood and congruent affect, speech clear and concise, behavior appropriate to this writer. Pt receptive to engagement in consult and agreeable to reviewing healthy drinking guidelines.   Provided SBIRT services: Full screen Positive. Positive reinforcement provided given patient currently within healthy guidelines. Education materials reviewed and given to patient.

## 2020-12-22 NOTE — PROVIDER CONTACT NOTE (OTHER) - ASSESSMENT
Alert and oriented x4, patient complaining of chills; pt has temp of 102.6, 
Patient with 102.9 temp s/p tylenol 1 hr ago
temp 100.5

## 2020-12-22 NOTE — PROGRESS NOTE ADULT - PROBLEM SELECTOR PLAN 1
On admission Fever 101.7, tachycardic to 120s. Given cefepime and vanc, IVF in ED  Suspect cellulitis over right chest wall port area, TTP, erythema  Continue sepsis, f/u cultures, ID consulted On admission Fever 101.7, tachycardic to 120s. Given cefepime and vanc, IVF in ED  Suspect cellulitis over right chest wall port area, TTP, erythema  Continue sepsis, f/u cultures, ID consulted. Port to be removed by IR today

## 2020-12-23 ENCOUNTER — APPOINTMENT (OUTPATIENT)
Dept: INFUSION THERAPY | Facility: HOSPITAL | Age: 60
End: 2020-12-23

## 2020-12-23 DIAGNOSIS — R78.81 BACTEREMIA: ICD-10-CM

## 2020-12-23 LAB
-  AMPICILLIN/SULBACTAM: SIGNIFICANT CHANGE UP
-  CEFAZOLIN: SIGNIFICANT CHANGE UP
-  CLINDAMYCIN: SIGNIFICANT CHANGE UP
-  ERYTHROMYCIN: SIGNIFICANT CHANGE UP
-  GENTAMICIN: SIGNIFICANT CHANGE UP
-  OXACILLIN: SIGNIFICANT CHANGE UP
-  PENICILLIN: SIGNIFICANT CHANGE UP
-  RIFAMPIN: SIGNIFICANT CHANGE UP
-  TETRACYCLINE: SIGNIFICANT CHANGE UP
-  TRIMETHOPRIM/SULFAMETHOXAZOLE: SIGNIFICANT CHANGE UP
-  VANCOMYCIN: SIGNIFICANT CHANGE UP
ANION GAP SERPL CALC-SCNC: 9 MMOL/L — SIGNIFICANT CHANGE UP (ref 7–14)
BASOPHILS # BLD AUTO: 0.02 K/UL — SIGNIFICANT CHANGE UP (ref 0–0.2)
BASOPHILS NFR BLD AUTO: 0.6 % — SIGNIFICANT CHANGE UP (ref 0–2)
BUN SERPL-MCNC: 5 MG/DL — LOW (ref 7–23)
CALCIUM SERPL-MCNC: 8.8 MG/DL — SIGNIFICANT CHANGE UP (ref 8.4–10.5)
CHLORIDE SERPL-SCNC: 106 MMOL/L — SIGNIFICANT CHANGE UP (ref 98–107)
CO2 SERPL-SCNC: 25 MMOL/L — SIGNIFICANT CHANGE UP (ref 22–31)
CREAT SERPL-MCNC: 0.6 MG/DL — SIGNIFICANT CHANGE UP (ref 0.5–1.3)
CULTURE RESULTS: SIGNIFICANT CHANGE UP
EOSINOPHIL # BLD AUTO: 0 K/UL — SIGNIFICANT CHANGE UP (ref 0–0.5)
EOSINOPHIL NFR BLD AUTO: 0 % — SIGNIFICANT CHANGE UP (ref 0–6)
GLUCOSE SERPL-MCNC: 106 MG/DL — HIGH (ref 70–99)
GRAM STN FLD: SIGNIFICANT CHANGE UP
HCT VFR BLD CALC: 24.4 % — LOW (ref 34.5–45)
HGB BLD-MCNC: 7.9 G/DL — LOW (ref 11.5–15.5)
IANC: 2.04 K/UL — SIGNIFICANT CHANGE UP (ref 1.5–8.5)
IMM GRANULOCYTES NFR BLD AUTO: 0.3 % — SIGNIFICANT CHANGE UP (ref 0–1.5)
LYMPHOCYTES # BLD AUTO: 0.86 K/UL — LOW (ref 1–3.3)
LYMPHOCYTES # BLD AUTO: 26.8 % — SIGNIFICANT CHANGE UP (ref 13–44)
MAGNESIUM SERPL-MCNC: 1.8 MG/DL — SIGNIFICANT CHANGE UP (ref 1.6–2.6)
MCHC RBC-ENTMCNC: 31.5 PG — SIGNIFICANT CHANGE UP (ref 27–34)
MCHC RBC-ENTMCNC: 32.4 GM/DL — SIGNIFICANT CHANGE UP (ref 32–36)
MCV RBC AUTO: 97.2 FL — SIGNIFICANT CHANGE UP (ref 80–100)
METHOD TYPE: SIGNIFICANT CHANGE UP
MONOCYTES # BLD AUTO: 0.28 K/UL — SIGNIFICANT CHANGE UP (ref 0–0.9)
MONOCYTES NFR BLD AUTO: 8.7 % — SIGNIFICANT CHANGE UP (ref 2–14)
NEUTROPHILS # BLD AUTO: 2.04 K/UL — SIGNIFICANT CHANGE UP (ref 1.8–7.4)
NEUTROPHILS NFR BLD AUTO: 63.6 % — SIGNIFICANT CHANGE UP (ref 43–77)
NIGHT BLUE STAIN TISS: SIGNIFICANT CHANGE UP
NRBC # BLD: 0 /100 WBCS — SIGNIFICANT CHANGE UP
NRBC # FLD: 0 K/UL — SIGNIFICANT CHANGE UP
ORGANISM # SPEC MICROSCOPIC CNT: SIGNIFICANT CHANGE UP
PHOSPHATE SERPL-MCNC: 2.4 MG/DL — LOW (ref 2.5–4.5)
PLATELET # BLD AUTO: 199 K/UL — SIGNIFICANT CHANGE UP (ref 150–400)
POTASSIUM SERPL-MCNC: 3.4 MMOL/L — LOW (ref 3.5–5.3)
POTASSIUM SERPL-SCNC: 3.4 MMOL/L — LOW (ref 3.5–5.3)
RBC # BLD: 2.51 M/UL — LOW (ref 3.8–5.2)
RBC # FLD: 16.9 % — HIGH (ref 10.3–14.5)
SARS-COV-2 RNA SPEC QL NAA+PROBE: SIGNIFICANT CHANGE UP
SODIUM SERPL-SCNC: 140 MMOL/L — SIGNIFICANT CHANGE UP (ref 135–145)
SPECIMEN SOURCE: SIGNIFICANT CHANGE UP
WBC # BLD: 3.21 K/UL — LOW (ref 3.8–10.5)
WBC # FLD AUTO: 3.21 K/UL — LOW (ref 3.8–10.5)

## 2020-12-23 PROCEDURE — 99233 SBSQ HOSP IP/OBS HIGH 50: CPT

## 2020-12-23 RX ORDER — ENOXAPARIN SODIUM 100 MG/ML
40 INJECTION SUBCUTANEOUS DAILY
Refills: 0 | Status: DISCONTINUED | OUTPATIENT
Start: 2020-12-23 | End: 2020-12-27

## 2020-12-23 RX ORDER — POTASSIUM CHLORIDE 20 MEQ
40 PACKET (EA) ORAL ONCE
Refills: 0 | Status: COMPLETED | OUTPATIENT
Start: 2020-12-23 | End: 2020-12-23

## 2020-12-23 RX ADMIN — Medication 650 MILLIGRAM(S): at 01:51

## 2020-12-23 RX ADMIN — SIMVASTATIN 20 MILLIGRAM(S): 20 TABLET, FILM COATED ORAL at 22:09

## 2020-12-23 RX ADMIN — Medication 40 MILLIEQUIVALENT(S): at 12:33

## 2020-12-23 RX ADMIN — Medication 63.75 MILLIMOLE(S): at 12:16

## 2020-12-23 RX ADMIN — PANTOPRAZOLE SODIUM 40 MILLIGRAM(S): 20 TABLET, DELAYED RELEASE ORAL at 05:13

## 2020-12-23 RX ADMIN — SODIUM CHLORIDE 150 MILLILITER(S): 9 INJECTION INTRAMUSCULAR; INTRAVENOUS; SUBCUTANEOUS at 05:13

## 2020-12-23 RX ADMIN — Medication 650 MILLIGRAM(S): at 12:15

## 2020-12-23 RX ADMIN — CHLORHEXIDINE GLUCONATE 1 APPLICATION(S): 213 SOLUTION TOPICAL at 14:05

## 2020-12-23 RX ADMIN — Medication 100 MILLIGRAM(S): at 14:05

## 2020-12-23 RX ADMIN — ENOXAPARIN SODIUM 40 MILLIGRAM(S): 100 INJECTION SUBCUTANEOUS at 22:11

## 2020-12-23 RX ADMIN — SODIUM CHLORIDE 75 MILLILITER(S): 9 INJECTION INTRAMUSCULAR; INTRAVENOUS; SUBCUTANEOUS at 18:46

## 2020-12-23 RX ADMIN — Medication 650 MILLIGRAM(S): at 02:34

## 2020-12-23 RX ADMIN — SODIUM CHLORIDE 150 MILLILITER(S): 9 INJECTION INTRAMUSCULAR; INTRAVENOUS; SUBCUTANEOUS at 12:16

## 2020-12-23 RX ADMIN — Medication 100 MILLIGRAM(S): at 05:14

## 2020-12-23 RX ADMIN — Medication 650 MILLIGRAM(S): at 13:10

## 2020-12-23 RX ADMIN — Medication 100 MILLIGRAM(S): at 22:09

## 2020-12-23 NOTE — PROGRESS NOTE ADULT - PROBLEM SELECTOR PLAN 1
On admission Fever 101.7, tachycardic to 120s. Given cefepime and vanc, IVF in ED  Suspect cellulitis over right chest wall port area, TTP, erythema  Continue sepsis, f/u cultures, ID consulted. Port to be removed by IR on 12/22  Will a negative culture, then likely picc line for prolonged antibiotics for bacteremia

## 2020-12-24 LAB
ANION GAP SERPL CALC-SCNC: 10 MMOL/L — SIGNIFICANT CHANGE UP (ref 7–14)
BASOPHILS # BLD AUTO: 0.02 K/UL — SIGNIFICANT CHANGE UP (ref 0–0.2)
BASOPHILS NFR BLD AUTO: 0.7 % — SIGNIFICANT CHANGE UP (ref 0–2)
BUN SERPL-MCNC: 6 MG/DL — LOW (ref 7–23)
CALCIUM SERPL-MCNC: 8.8 MG/DL — SIGNIFICANT CHANGE UP (ref 8.4–10.5)
CHLORIDE SERPL-SCNC: 109 MMOL/L — HIGH (ref 98–107)
CO2 SERPL-SCNC: 23 MMOL/L — SIGNIFICANT CHANGE UP (ref 22–31)
CREAT SERPL-MCNC: 0.57 MG/DL — SIGNIFICANT CHANGE UP (ref 0.5–1.3)
CULTURE RESULTS: SIGNIFICANT CHANGE UP
EOSINOPHIL # BLD AUTO: 0.01 K/UL — SIGNIFICANT CHANGE UP (ref 0–0.5)
EOSINOPHIL NFR BLD AUTO: 0.3 % — SIGNIFICANT CHANGE UP (ref 0–6)
GLUCOSE SERPL-MCNC: 116 MG/DL — HIGH (ref 70–99)
HCT VFR BLD CALC: 25.5 % — LOW (ref 34.5–45)
HGB BLD-MCNC: 8.3 G/DL — LOW (ref 11.5–15.5)
IANC: 1.33 K/UL — LOW (ref 1.5–8.5)
IMM GRANULOCYTES NFR BLD AUTO: 0.3 % — SIGNIFICANT CHANGE UP (ref 0–1.5)
LYMPHOCYTES # BLD AUTO: 1.16 K/UL — SIGNIFICANT CHANGE UP (ref 1–3.3)
LYMPHOCYTES # BLD AUTO: 39.2 % — SIGNIFICANT CHANGE UP (ref 13–44)
MAGNESIUM SERPL-MCNC: 1.7 MG/DL — SIGNIFICANT CHANGE UP (ref 1.6–2.6)
MCHC RBC-ENTMCNC: 31.9 PG — SIGNIFICANT CHANGE UP (ref 27–34)
MCHC RBC-ENTMCNC: 32.5 GM/DL — SIGNIFICANT CHANGE UP (ref 32–36)
MCV RBC AUTO: 98.1 FL — SIGNIFICANT CHANGE UP (ref 80–100)
MONOCYTES # BLD AUTO: 0.43 K/UL — SIGNIFICANT CHANGE UP (ref 0–0.9)
MONOCYTES NFR BLD AUTO: 14.5 % — HIGH (ref 2–14)
NEUTROPHILS # BLD AUTO: 1.33 K/UL — LOW (ref 1.8–7.4)
NEUTROPHILS NFR BLD AUTO: 45 % — SIGNIFICANT CHANGE UP (ref 43–77)
NRBC # BLD: 0 /100 WBCS — SIGNIFICANT CHANGE UP
NRBC # FLD: 0 K/UL — SIGNIFICANT CHANGE UP
PHOSPHATE SERPL-MCNC: 3.5 MG/DL — SIGNIFICANT CHANGE UP (ref 2.5–4.5)
PLATELET # BLD AUTO: 219 K/UL — SIGNIFICANT CHANGE UP (ref 150–400)
POTASSIUM SERPL-MCNC: 3.6 MMOL/L — SIGNIFICANT CHANGE UP (ref 3.5–5.3)
POTASSIUM SERPL-SCNC: 3.6 MMOL/L — SIGNIFICANT CHANGE UP (ref 3.5–5.3)
RBC # BLD: 2.6 M/UL — LOW (ref 3.8–5.2)
RBC # FLD: 17.1 % — HIGH (ref 10.3–14.5)
SODIUM SERPL-SCNC: 142 MMOL/L — SIGNIFICANT CHANGE UP (ref 135–145)
SPECIMEN SOURCE: SIGNIFICANT CHANGE UP
WBC # BLD: 2.96 K/UL — LOW (ref 3.8–10.5)
WBC # FLD AUTO: 2.96 K/UL — LOW (ref 3.8–10.5)

## 2020-12-24 PROCEDURE — 99233 SBSQ HOSP IP/OBS HIGH 50: CPT

## 2020-12-24 PROCEDURE — 99232 SBSQ HOSP IP/OBS MODERATE 35: CPT

## 2020-12-24 PROCEDURE — 93306 TTE W/DOPPLER COMPLETE: CPT | Mod: 26

## 2020-12-24 RX ADMIN — Medication 100 MILLIGRAM(S): at 21:35

## 2020-12-24 RX ADMIN — PANTOPRAZOLE SODIUM 40 MILLIGRAM(S): 20 TABLET, DELAYED RELEASE ORAL at 06:35

## 2020-12-24 RX ADMIN — SODIUM CHLORIDE 75 MILLILITER(S): 9 INJECTION INTRAMUSCULAR; INTRAVENOUS; SUBCUTANEOUS at 11:31

## 2020-12-24 RX ADMIN — ENOXAPARIN SODIUM 40 MILLIGRAM(S): 100 INJECTION SUBCUTANEOUS at 21:35

## 2020-12-24 RX ADMIN — SIMVASTATIN 20 MILLIGRAM(S): 20 TABLET, FILM COATED ORAL at 21:35

## 2020-12-24 RX ADMIN — Medication 100 MILLIGRAM(S): at 13:37

## 2020-12-24 RX ADMIN — Medication 100 MILLIGRAM(S): at 06:35

## 2020-12-24 NOTE — PROGRESS NOTE ADULT - PROBLEM SELECTOR PLAN 1
On admission Fever 101.7, tachycardic to 120s. Given cefepime and vanc, IVF in ED  Suspect cellulitis over right chest wall port area, TTP, erythema  Continue sepsis, f/u cultures, ID consulted. Port to be removed by IR on 12/22  Will need a negative culture, then likely picc line for prolonged antibiotics for bacteremia

## 2020-12-24 NOTE — PROGRESS NOTE ADULT - ATTENDING COMMENTS
Huan Soares  Pager: [phone]. If no response or past 5 pm call [phone].
Huan Soares  Pager: [phone]. If no response or past 5 pm call [phone].     Please call ID service for questions over weekend at [phone].     My colleague will cover starting 12/28/20

## 2020-12-25 PROCEDURE — 99233 SBSQ HOSP IP/OBS HIGH 50: CPT

## 2020-12-25 RX ADMIN — SODIUM CHLORIDE 75 MILLILITER(S): 9 INJECTION INTRAMUSCULAR; INTRAVENOUS; SUBCUTANEOUS at 22:12

## 2020-12-25 RX ADMIN — SIMVASTATIN 20 MILLIGRAM(S): 20 TABLET, FILM COATED ORAL at 21:28

## 2020-12-25 RX ADMIN — ENOXAPARIN SODIUM 40 MILLIGRAM(S): 100 INJECTION SUBCUTANEOUS at 21:28

## 2020-12-25 RX ADMIN — PANTOPRAZOLE SODIUM 40 MILLIGRAM(S): 20 TABLET, DELAYED RELEASE ORAL at 06:25

## 2020-12-25 RX ADMIN — Medication 100 MILLIGRAM(S): at 21:28

## 2020-12-25 RX ADMIN — Medication 100 MILLIGRAM(S): at 06:25

## 2020-12-25 RX ADMIN — CHLORHEXIDINE GLUCONATE 1 APPLICATION(S): 213 SOLUTION TOPICAL at 11:10

## 2020-12-25 RX ADMIN — Medication 100 MILLIGRAM(S): at 13:16

## 2020-12-25 NOTE — PROGRESS NOTE ADULT - PROBLEM SELECTOR PLAN 1
On admission Fever 101.7, tachycardic to 120s. Given cefepime and vanc, IVF in ED  Suspect cellulitis over right chest wall port area, TTP, erythema  Continue sepsis, f/u cultures, ID consulted. Port removed by IR on 12/22  Will need a negative culture for 72 hrs then likely picc line for prolonged antibiotics for bacteremia

## 2020-12-26 LAB
ANION GAP SERPL CALC-SCNC: 11 MMOL/L — SIGNIFICANT CHANGE UP (ref 7–14)
BASOPHILS # BLD AUTO: 0.03 K/UL — SIGNIFICANT CHANGE UP (ref 0–0.2)
BASOPHILS NFR BLD AUTO: 0.8 % — SIGNIFICANT CHANGE UP (ref 0–2)
BUN SERPL-MCNC: 7 MG/DL — SIGNIFICANT CHANGE UP (ref 7–23)
CALCIUM SERPL-MCNC: 9.3 MG/DL — SIGNIFICANT CHANGE UP (ref 8.4–10.5)
CHLORIDE SERPL-SCNC: 107 MMOL/L — SIGNIFICANT CHANGE UP (ref 98–107)
CO2 SERPL-SCNC: 25 MMOL/L — SIGNIFICANT CHANGE UP (ref 22–31)
CREAT SERPL-MCNC: 0.58 MG/DL — SIGNIFICANT CHANGE UP (ref 0.5–1.3)
EOSINOPHIL # BLD AUTO: 0.02 K/UL — SIGNIFICANT CHANGE UP (ref 0–0.5)
EOSINOPHIL NFR BLD AUTO: 0.5 % — SIGNIFICANT CHANGE UP (ref 0–6)
GLUCOSE SERPL-MCNC: 99 MG/DL — SIGNIFICANT CHANGE UP (ref 70–99)
HCT VFR BLD CALC: 25.4 % — LOW (ref 34.5–45)
HGB BLD-MCNC: 8 G/DL — LOW (ref 11.5–15.5)
IANC: 1.61 K/UL — SIGNIFICANT CHANGE UP (ref 1.5–8.5)
IMM GRANULOCYTES NFR BLD AUTO: 0.5 % — SIGNIFICANT CHANGE UP (ref 0–1.5)
LYMPHOCYTES # BLD AUTO: 1.49 K/UL — SIGNIFICANT CHANGE UP (ref 1–3.3)
LYMPHOCYTES # BLD AUTO: 40.6 % — SIGNIFICANT CHANGE UP (ref 13–44)
MAGNESIUM SERPL-MCNC: 1.7 MG/DL — SIGNIFICANT CHANGE UP (ref 1.6–2.6)
MCHC RBC-ENTMCNC: 30.9 PG — SIGNIFICANT CHANGE UP (ref 27–34)
MCHC RBC-ENTMCNC: 31.5 GM/DL — LOW (ref 32–36)
MCV RBC AUTO: 98.1 FL — SIGNIFICANT CHANGE UP (ref 80–100)
MONOCYTES # BLD AUTO: 0.5 K/UL — SIGNIFICANT CHANGE UP (ref 0–0.9)
MONOCYTES NFR BLD AUTO: 13.6 % — SIGNIFICANT CHANGE UP (ref 2–14)
NEUTROPHILS # BLD AUTO: 1.61 K/UL — LOW (ref 1.8–7.4)
NEUTROPHILS NFR BLD AUTO: 44 % — SIGNIFICANT CHANGE UP (ref 43–77)
NRBC # BLD: 0 /100 WBCS — SIGNIFICANT CHANGE UP
NRBC # FLD: 0 K/UL — SIGNIFICANT CHANGE UP
PHOSPHATE SERPL-MCNC: 4.6 MG/DL — HIGH (ref 2.5–4.5)
PLATELET # BLD AUTO: 350 K/UL — SIGNIFICANT CHANGE UP (ref 150–400)
POTASSIUM SERPL-MCNC: 3.8 MMOL/L — SIGNIFICANT CHANGE UP (ref 3.5–5.3)
POTASSIUM SERPL-SCNC: 3.8 MMOL/L — SIGNIFICANT CHANGE UP (ref 3.5–5.3)
RBC # BLD: 2.59 M/UL — LOW (ref 3.8–5.2)
RBC # FLD: 17.1 % — HIGH (ref 10.3–14.5)
SODIUM SERPL-SCNC: 143 MMOL/L — SIGNIFICANT CHANGE UP (ref 135–145)
WBC # BLD: 3.67 K/UL — LOW (ref 3.8–10.5)
WBC # FLD AUTO: 3.67 K/UL — LOW (ref 3.8–10.5)

## 2020-12-26 PROCEDURE — 99233 SBSQ HOSP IP/OBS HIGH 50: CPT

## 2020-12-26 RX ADMIN — Medication 100 MILLIGRAM(S): at 06:39

## 2020-12-26 RX ADMIN — CHLORHEXIDINE GLUCONATE 1 APPLICATION(S): 213 SOLUTION TOPICAL at 11:38

## 2020-12-26 RX ADMIN — SIMVASTATIN 20 MILLIGRAM(S): 20 TABLET, FILM COATED ORAL at 21:54

## 2020-12-26 RX ADMIN — Medication 100 MILLIGRAM(S): at 21:54

## 2020-12-26 RX ADMIN — Medication 100 MILLIGRAM(S): at 13:49

## 2020-12-26 RX ADMIN — ENOXAPARIN SODIUM 40 MILLIGRAM(S): 100 INJECTION SUBCUTANEOUS at 21:54

## 2020-12-26 RX ADMIN — SODIUM CHLORIDE 75 MILLILITER(S): 9 INJECTION INTRAMUSCULAR; INTRAVENOUS; SUBCUTANEOUS at 11:38

## 2020-12-26 RX ADMIN — PANTOPRAZOLE SODIUM 40 MILLIGRAM(S): 20 TABLET, DELAYED RELEASE ORAL at 06:39

## 2020-12-26 NOTE — PROGRESS NOTE ADULT - PROBLEM SELECTOR PLAN 1
On admission Fever 101.7, tachycardic to 120s. Given cefepime and vanc, IVF in ED  Suspect cellulitis over right chest wall port area, TTP, erythema  +Staph Aureus bacteremia - now on cefepime  Continue sepsis, f/u cultures, ID consulted. Port removed by IR on 12/22  Will need a negative culture for 72 hrs then likely picc line for prolonged antibiotics for bacteremia- likely to be done on Monday, 12/28

## 2020-12-27 ENCOUNTER — TRANSCRIPTION ENCOUNTER (OUTPATIENT)
Age: 60
End: 2020-12-27

## 2020-12-27 LAB
ANION GAP SERPL CALC-SCNC: 9 MMOL/L — SIGNIFICANT CHANGE UP (ref 7–14)
BASOPHILS # BLD AUTO: 0.04 K/UL — SIGNIFICANT CHANGE UP (ref 0–0.2)
BASOPHILS NFR BLD AUTO: 0.9 % — SIGNIFICANT CHANGE UP (ref 0–2)
BUN SERPL-MCNC: 9 MG/DL — SIGNIFICANT CHANGE UP (ref 7–23)
CALCIUM SERPL-MCNC: 9.5 MG/DL — SIGNIFICANT CHANGE UP (ref 8.4–10.5)
CHLORIDE SERPL-SCNC: 107 MMOL/L — SIGNIFICANT CHANGE UP (ref 98–107)
CO2 SERPL-SCNC: 26 MMOL/L — SIGNIFICANT CHANGE UP (ref 22–31)
CREAT SERPL-MCNC: 0.62 MG/DL — SIGNIFICANT CHANGE UP (ref 0.5–1.3)
CULTURE RESULTS: NO GROWTH — SIGNIFICANT CHANGE UP
EOSINOPHIL # BLD AUTO: 0.04 K/UL — SIGNIFICANT CHANGE UP (ref 0–0.5)
EOSINOPHIL NFR BLD AUTO: 0.9 % — SIGNIFICANT CHANGE UP (ref 0–6)
GLUCOSE SERPL-MCNC: 104 MG/DL — HIGH (ref 70–99)
HCT VFR BLD CALC: 28 % — LOW (ref 34.5–45)
HGB BLD-MCNC: 8.6 G/DL — LOW (ref 11.5–15.5)
IANC: 2.17 K/UL — SIGNIFICANT CHANGE UP (ref 1.5–8.5)
IMM GRANULOCYTES NFR BLD AUTO: 0.6 % — SIGNIFICANT CHANGE UP (ref 0–1.5)
LYMPHOCYTES # BLD AUTO: 1.9 K/UL — SIGNIFICANT CHANGE UP (ref 1–3.3)
LYMPHOCYTES # BLD AUTO: 40.7 % — SIGNIFICANT CHANGE UP (ref 13–44)
MAGNESIUM SERPL-MCNC: 1.7 MG/DL — SIGNIFICANT CHANGE UP (ref 1.6–2.6)
MCHC RBC-ENTMCNC: 30.7 GM/DL — LOW (ref 32–36)
MCHC RBC-ENTMCNC: 30.9 PG — SIGNIFICANT CHANGE UP (ref 27–34)
MCV RBC AUTO: 100.7 FL — HIGH (ref 80–100)
MONOCYTES # BLD AUTO: 0.49 K/UL — SIGNIFICANT CHANGE UP (ref 0–0.9)
MONOCYTES NFR BLD AUTO: 10.5 % — SIGNIFICANT CHANGE UP (ref 2–14)
NEUTROPHILS # BLD AUTO: 2.17 K/UL — SIGNIFICANT CHANGE UP (ref 1.8–7.4)
NEUTROPHILS NFR BLD AUTO: 46.4 % — SIGNIFICANT CHANGE UP (ref 43–77)
NRBC # BLD: 0 /100 WBCS — SIGNIFICANT CHANGE UP
NRBC # FLD: 0 K/UL — SIGNIFICANT CHANGE UP
PHOSPHATE SERPL-MCNC: 4.4 MG/DL — SIGNIFICANT CHANGE UP (ref 2.5–4.5)
PLATELET # BLD AUTO: 374 K/UL — SIGNIFICANT CHANGE UP (ref 150–400)
POTASSIUM SERPL-MCNC: 3.7 MMOL/L — SIGNIFICANT CHANGE UP (ref 3.5–5.3)
POTASSIUM SERPL-SCNC: 3.7 MMOL/L — SIGNIFICANT CHANGE UP (ref 3.5–5.3)
RBC # BLD: 2.78 M/UL — LOW (ref 3.8–5.2)
RBC # FLD: 17.1 % — HIGH (ref 10.3–14.5)
SODIUM SERPL-SCNC: 142 MMOL/L — SIGNIFICANT CHANGE UP (ref 135–145)
SPECIMEN SOURCE: SIGNIFICANT CHANGE UP
WBC # BLD: 4.67 K/UL — SIGNIFICANT CHANGE UP (ref 3.8–10.5)
WBC # FLD AUTO: 4.67 K/UL — SIGNIFICANT CHANGE UP (ref 3.8–10.5)

## 2020-12-27 PROCEDURE — 99233 SBSQ HOSP IP/OBS HIGH 50: CPT

## 2020-12-27 RX ORDER — ENOXAPARIN SODIUM 100 MG/ML
40 INJECTION SUBCUTANEOUS ONCE
Refills: 0 | Status: COMPLETED | OUTPATIENT
Start: 2020-12-27 | End: 2020-12-27

## 2020-12-27 RX ADMIN — Medication 100 MILLIGRAM(S): at 14:01

## 2020-12-27 RX ADMIN — SIMVASTATIN 20 MILLIGRAM(S): 20 TABLET, FILM COATED ORAL at 21:45

## 2020-12-27 RX ADMIN — PANTOPRAZOLE SODIUM 40 MILLIGRAM(S): 20 TABLET, DELAYED RELEASE ORAL at 06:02

## 2020-12-27 RX ADMIN — Medication 100 MILLIGRAM(S): at 06:02

## 2020-12-27 RX ADMIN — CHLORHEXIDINE GLUCONATE 1 APPLICATION(S): 213 SOLUTION TOPICAL at 14:01

## 2020-12-27 RX ADMIN — Medication 100 MILLIGRAM(S): at 21:46

## 2020-12-27 RX ADMIN — ENOXAPARIN SODIUM 40 MILLIGRAM(S): 100 INJECTION SUBCUTANEOUS at 21:46

## 2020-12-27 NOTE — DISCHARGE NOTE PROVIDER - NSDCCPCAREPLAN_GEN_ALL_CORE_FT
PRINCIPAL DISCHARGE DIAGNOSIS  Diagnosis: Fever  Assessment and Plan of Treatment: Complete antibiotic therapy as directed.  Monitor for any further signs and symptoms of further infection including but not limited to fevers, rigors, chills and or difficulty breathing.      SECONDARY DISCHARGE DIAGNOSES  Diagnosis: Bacteremia  Assessment and Plan of Treatment: Your mediport was removed due to concern for infection. plan as above.    Diagnosis: Malignant neoplasm of left female breast, unspecified estrogen receptor status, unspecified site of breast  Assessment and Plan of Treatment: Outpatient follow up with your oncologist Dr. Bell at [facility]     PRINCIPAL DISCHARGE DIAGNOSIS  Diagnosis: Fever  Assessment and Plan of Treatment: Complete antibiotic therapy as directed: IV Cefazolin 2g every 8 hours felicia 1/20/2021. Your mediport was removed by interventional radiology on 12/22/2020. You had a PICC line placed on 12/28. Your home care for PICC line/IV antibiotics will begin on 12/29/2020. Weekly lab work recommened (CBC, CMP) faxed to infectious disease office (Fax# [phone]). Monitor for any further signs and symptoms of further infection including but not limited to fevers, rigors, chills and or difficulty breathing.      SECONDARY DISCHARGE DIAGNOSES  Diagnosis: Bacteremia  Assessment and Plan of Treatment: Your mediport was removed due to concern for infection. plan as above.    Diagnosis: Malignant neoplasm of left female breast, unspecified estrogen receptor status, unspecified site of breast  Assessment and Plan of Treatment: Outpatient follow up with your oncologist Dr. Bell at [facility] for further treatment/management.     PRINCIPAL DISCHARGE DIAGNOSIS  Diagnosis: Fever  Assessment and Plan of Treatment: Complete antibiotic therapy as directed: IV Cefazolin 2g every 8 hours felicia 1/20/2021. Your mediport was removed by interventional radiology on 12/22/2020. You had a PICC line placed on 12/28. Your home care for PICC line/IV antibiotics will begin on 12/29/2020. Weekly lab work recommended (CBC, CMP) faxed to infectious disease office (Fax# [phone]). Monitor for any further signs and symptoms of further infection including but not limited to fevers, rigors, chills and or difficulty breathing, pain/swelling/discharge from PICC line site.      SECONDARY DISCHARGE DIAGNOSES  Diagnosis: Bacteremia  Assessment and Plan of Treatment: Your mediport was removed due to concern for infection. plan as above.    Diagnosis: Malignant neoplasm of left female breast, unspecified estrogen receptor status, unspecified site of breast  Assessment and Plan of Treatment: Outpatient follow up with your oncologist Dr. Bell at [facility] for further treatment/management.

## 2020-12-27 NOTE — DISCHARGE NOTE PROVIDER - PROVIDER TOKENS
PROVIDER:[TOKEN:[[id number]],FOLLOWUP:[1 week]] PROVIDER:[TOKEN:[[id number]],FOLLOWUP:[1 week]],PROVIDER:[TOKEN:[[id number]],FOLLOWUP:[Routine]],FREE:[LAST:[primary care provider],PHONE:[(   )    -],FAX:[(   )    -],FOLLOWUP:[1 week]]

## 2020-12-27 NOTE — DISCHARGE NOTE PROVIDER - CARE PROVIDER_API CALL
Cassie Bell  HEMATOLOGY  [address] [place] [address]  Phone: [phone]  Fax: [phone]  Follow Up Time: 1 week   Cassie Bell  HEMATOLOGY  [address] [place] [address]  Phone: [phone]  Fax: [phone]  Follow Up Time: 1 week    Mary Munguia  INFECTIOUS DISEASE  [address] [place] [address]  Phone: [phone]  Fax: [phone]  Follow Up Time: Routine    primary care provider,   Phone: (   )    -  Fax: (   )    -  Follow Up Time: 1 week

## 2020-12-27 NOTE — DISCHARGE NOTE PROVIDER - CARE PROVIDERS DIRECT ADDRESSES
[email or web address]

## 2020-12-27 NOTE — DISCHARGE NOTE PROVIDER - NSDCFUSCHEDAPPT_GEN_ALL_CORE_FT
KELSY CRANE ; 12/28/2020 ; [facility]  KELSY CRANE ; 12/30/2020 ; KELSY Holbrook ; 12/30/2020 ; [facility] KELSY CRANE ; 12/30/2020 ; [facility]  KELSY CRANE ; 12/30/2020 ; [facility]

## 2020-12-27 NOTE — DISCHARGE NOTE PROVIDER - NSDCMRMEDTOKEN_GEN_ALL_CORE_FT
omeprazole 20 mg oral delayed release capsule: orally once a day  simvastatin 20 mg oral tablet: 1 tab(s) orally once a day (at bedtime)  Tylenol 325 mg oral tablet: 2 tab(s) orally every 4 hours, As Needed   acetaminophen 325 mg oral tablet: 2 tab(s) orally every 6 hours, As needed, Temp greater or equal to 38C (100.4F), Mild Pain (1 - 3)  ceFAZolin 2 g intravenous injection: 2 gram(s) intravenous every 8 hours via PICC MDD:6g  labs : cbc, cmp weekly to our office [phone]  omeprazole 20 mg oral delayed release capsule: orally once a day  simvastatin 20 mg oral tablet: 1 tab(s) orally once a day (at bedtime)

## 2020-12-27 NOTE — DISCHARGE NOTE PROVIDER - HOSPITAL COURSE
60F with PMHx breast cancer on chemotherapy, HLD presenting with erythema, pain, fever to right chest port site x1 week. Suspected cellulitis/port infection    Sepsis 2/2 bacteremia & cellulitis of mediport  -ID consulted  -Bcx: staph aureus  -ESR and CRP elevated  -UA negative / Ucx normal elliot   -ID consulted: changed from Vanco and cefepime to Ancef on 12/22   -S/p port removal on 12/22   -Port cx ---   -Repeat bcx on 12/23 --- NTD    -TTE --Normal, no noted vegetation     Malignant neoplasm of left female breast  -Onc consulted  -On chemo, last treatment appears to be 12/2. Follows with Dr. Cassie Bell.   -Onc in agreement with port removal     Dyslipidemia.   -resume simvastatin.     Need for prophylactic measure.    -Lovenox qd   -DASH/TLC diet.     On_________, discussed with __________, patient is medically cleared and optimized for discharge today. All medications were reviewed with attending, and sent to mutually agreed upon pharmacy. 60F with PMHx breast cancer on chemotherapy, HLD presenting with erythema, pain, fever to right chest port site x1 week. Suspected cellulitis/port infection    Sepsis 2/2 bacteremia & cellulitis of mediport  -ID consulted  -Bcx: staph aureus  -ESR and CRP elevated  -UA negative / Ucx normal elliot   -ID consulted: changed from Vanco and cefepime to Ancef on 12/22 ---> Plan for Ancef 2g q8H through 1/20/2021   -S/p port removal on 12/22   -Port cx in testing   -Repeat bcx on 12/23 --- negative    -TTE --Normal, no noted vegetation   - Per discussion with CM on 12/28, home care services arranged for PICC line/IV antibiotics, to start in am 12/29     Malignant neoplasm of left female breast  -Onc consulted  -On chemo, last treatment appears to be 12/2. Follows with Dr. Cassie Bell.   -Onc in agreement with port removal     Dyslipidemia.   -resume simvastatin.     Need for prophylactic measure.    -Lovenox qd   -DASH/TLC diet.     On 12/28/2020, discussed with Dr. Laird, patient is medically cleared and optimized for discharge today. All medications were reviewed with attending, and sent to mutually agreed upon pharmacy.

## 2020-12-28 ENCOUNTER — TRANSCRIPTION ENCOUNTER (OUTPATIENT)
Age: 60
End: 2020-12-28

## 2020-12-28 VITALS
TEMPERATURE: 98 F | SYSTOLIC BLOOD PRESSURE: 115 MMHG | OXYGEN SATURATION: 98 % | RESPIRATION RATE: 18 BRPM | HEART RATE: 83 BPM | DIASTOLIC BLOOD PRESSURE: 78 MMHG

## 2020-12-28 LAB
ANION GAP SERPL CALC-SCNC: 10 MMOL/L — SIGNIFICANT CHANGE UP (ref 7–14)
BASOPHILS # BLD AUTO: 0.05 K/UL — SIGNIFICANT CHANGE UP (ref 0–0.2)
BASOPHILS NFR BLD AUTO: 0.7 % — SIGNIFICANT CHANGE UP (ref 0–2)
BUN SERPL-MCNC: 10 MG/DL — SIGNIFICANT CHANGE UP (ref 7–23)
CALCIUM SERPL-MCNC: 9.6 MG/DL — SIGNIFICANT CHANGE UP (ref 8.4–10.5)
CHLORIDE SERPL-SCNC: 105 MMOL/L — SIGNIFICANT CHANGE UP (ref 98–107)
CO2 SERPL-SCNC: 25 MMOL/L — SIGNIFICANT CHANGE UP (ref 22–31)
CREAT SERPL-MCNC: 0.69 MG/DL — SIGNIFICANT CHANGE UP (ref 0.5–1.3)
CULTURE RESULTS: SIGNIFICANT CHANGE UP
CULTURE RESULTS: SIGNIFICANT CHANGE UP
EOSINOPHIL # BLD AUTO: 0.04 K/UL — SIGNIFICANT CHANGE UP (ref 0–0.5)
EOSINOPHIL NFR BLD AUTO: 0.6 % — SIGNIFICANT CHANGE UP (ref 0–6)
GLUCOSE SERPL-MCNC: 101 MG/DL — HIGH (ref 70–99)
HCT VFR BLD CALC: 27.7 % — LOW (ref 34.5–45)
HGB BLD-MCNC: 8.9 G/DL — LOW (ref 11.5–15.5)
IANC: 4.12 K/UL — SIGNIFICANT CHANGE UP (ref 1.5–8.5)
IMM GRANULOCYTES NFR BLD AUTO: 1 % — SIGNIFICANT CHANGE UP (ref 0–1.5)
LYMPHOCYTES # BLD AUTO: 1.97 K/UL — SIGNIFICANT CHANGE UP (ref 1–3.3)
LYMPHOCYTES # BLD AUTO: 29.1 % — SIGNIFICANT CHANGE UP (ref 13–44)
MAGNESIUM SERPL-MCNC: 1.7 MG/DL — SIGNIFICANT CHANGE UP (ref 1.6–2.6)
MCHC RBC-ENTMCNC: 31.8 PG — SIGNIFICANT CHANGE UP (ref 27–34)
MCHC RBC-ENTMCNC: 32.1 GM/DL — SIGNIFICANT CHANGE UP (ref 32–36)
MCV RBC AUTO: 98.9 FL — SIGNIFICANT CHANGE UP (ref 80–100)
MONOCYTES # BLD AUTO: 0.51 K/UL — SIGNIFICANT CHANGE UP (ref 0–0.9)
MONOCYTES NFR BLD AUTO: 7.5 % — SIGNIFICANT CHANGE UP (ref 2–14)
NEUTROPHILS # BLD AUTO: 4.12 K/UL — SIGNIFICANT CHANGE UP (ref 1.8–7.4)
NEUTROPHILS NFR BLD AUTO: 61.1 % — SIGNIFICANT CHANGE UP (ref 43–77)
NRBC # BLD: 0 /100 WBCS — SIGNIFICANT CHANGE UP
NRBC # FLD: 0 K/UL — SIGNIFICANT CHANGE UP
PHOSPHATE SERPL-MCNC: 4.2 MG/DL — SIGNIFICANT CHANGE UP (ref 2.5–4.5)
PLATELET # BLD AUTO: 448 K/UL — HIGH (ref 150–400)
POTASSIUM SERPL-MCNC: 4 MMOL/L — SIGNIFICANT CHANGE UP (ref 3.5–5.3)
POTASSIUM SERPL-SCNC: 4 MMOL/L — SIGNIFICANT CHANGE UP (ref 3.5–5.3)
RBC # BLD: 2.8 M/UL — LOW (ref 3.8–5.2)
RBC # FLD: 17.2 % — HIGH (ref 10.3–14.5)
SODIUM SERPL-SCNC: 140 MMOL/L — SIGNIFICANT CHANGE UP (ref 135–145)
SPECIMEN SOURCE: SIGNIFICANT CHANGE UP
SPECIMEN SOURCE: SIGNIFICANT CHANGE UP
WBC # BLD: 6.76 K/UL — SIGNIFICANT CHANGE UP (ref 3.8–10.5)
WBC # FLD AUTO: 6.76 K/UL — SIGNIFICANT CHANGE UP (ref 3.8–10.5)

## 2020-12-28 PROCEDURE — 99239 HOSP IP/OBS DSCHRG MGMT >30: CPT

## 2020-12-28 PROCEDURE — 99232 SBSQ HOSP IP/OBS MODERATE 35: CPT

## 2020-12-28 PROCEDURE — 36573 INSJ PICC RS&I 5 YR+: CPT

## 2020-12-28 RX ORDER — CEFAZOLIN SODIUM 1 G
2 VIAL (EA) INJECTION
Qty: 168 | Refills: 0
Start: 2020-12-28 | End: 2021-01-24

## 2020-12-28 RX ORDER — SODIUM CHLORIDE 9 MG/ML
10 INJECTION INTRAMUSCULAR; INTRAVENOUS; SUBCUTANEOUS
Refills: 0 | Status: DISCONTINUED | OUTPATIENT
Start: 2020-12-28 | End: 2020-12-28

## 2020-12-28 RX ORDER — ACETAMINOPHEN 500 MG
2 TABLET ORAL
Qty: 0 | Refills: 0 | DISCHARGE

## 2020-12-28 RX ORDER — ACETAMINOPHEN 500 MG
2 TABLET ORAL
Qty: 0 | Refills: 0 | DISCHARGE
Start: 2020-12-28

## 2020-12-28 RX ORDER — SIMVASTATIN 20 MG/1
1 TABLET, FILM COATED ORAL
Qty: 0 | Refills: 0 | DISCHARGE

## 2020-12-28 RX ORDER — SIMVASTATIN 20 MG/1
1 TABLET, FILM COATED ORAL
Qty: 0 | Refills: 0 | DISCHARGE
Start: 2020-12-28

## 2020-12-28 RX ADMIN — Medication 100 MILLIGRAM(S): at 13:13

## 2020-12-28 RX ADMIN — Medication 100 MILLIGRAM(S): at 06:07

## 2020-12-28 RX ADMIN — PANTOPRAZOLE SODIUM 40 MILLIGRAM(S): 20 TABLET, DELAYED RELEASE ORAL at 06:07

## 2020-12-28 RX ADMIN — CHLORHEXIDINE GLUCONATE 1 APPLICATION(S): 213 SOLUTION TOPICAL at 13:13

## 2020-12-28 RX ADMIN — Medication 650 MILLIGRAM(S): at 19:43

## 2020-12-28 RX ADMIN — Medication 650 MILLIGRAM(S): at 18:28

## 2020-12-28 RX ADMIN — Medication 100 MILLIGRAM(S): at 18:29

## 2020-12-28 NOTE — PROGRESS NOTE ADULT - PROBLEM SELECTOR PROBLEM 2
Bacteremia
Bacteremia
Sepsis, due to unspecified organism, unspecified whether acute organ dysfunction present
Bacteremia
Sepsis, due to unspecified organism, unspecified whether acute organ dysfunction present
Bacteremia

## 2020-12-28 NOTE — PROGRESS NOTE ADULT - PROBLEM SELECTOR PLAN 3
On chemo, last treatment appears to be 12/2. Follows with Dr. Cassie Bell.
as above
On chemo, last treatment appears to be 12/2. Follows with Dr. Cassie Bell.
as above

## 2020-12-28 NOTE — PROGRESS NOTE ADULT - REASON FOR ADMISSION
erythema, pain to right chest port site x1 week

## 2020-12-28 NOTE — PROGRESS NOTE ADULT - PROBLEM SELECTOR PLAN 1
On admission Fever 101.7, tachycardic to 120s. Given cefepime and vanc, IVF in ED  Suspect cellulitis over right chest wall port area, TTP, erythema  +Staph Aureus bacteremia - now on cefepime  Continue sepsis, f/u cultures, ID consulted. Port removed by IR on 12/22  Will need a negative culture for 72 hrs then likely picc line for prolonged antibiotics for bacteremia- DC today after PICC

## 2020-12-28 NOTE — PROGRESS NOTE ADULT - PROBLEM SELECTOR PLAN 4
On chemo, last treatment appears to be 12/2. Follows with Dr. Cassie Bell.
On chemo, last treatment appears to be 12/2. Follows with Dr. Cassie Bell.
resume simvastatin
On chemo, last treatment appears to be 12/2. Follows with Dr. Cassie Bell.
resume simvastatin
On chemo, last treatment appears to be 12/2. Follows with Dr. Cassie Bell.

## 2020-12-28 NOTE — PROGRESS NOTE ADULT - PROBLEM SELECTOR PLAN 2
Sensitivities resulted, will likely need cefazolin for two weeks since last negative blood cxs then may be able transition to PO  Echo pending  As above
Sensitivities resulted, will likely need cefazolin for two weeks since last negative blood cxs then may be able transition to PO  As above  Echo normal
as above
Sensitivities resulted, will likely need cefazolin for two weeks since last negative blood cxs then may be able transition to PO  Echo pending  As above

## 2020-12-28 NOTE — PROGRESS NOTE ADULT - PROBLEM SELECTOR PLAN 5
resume simvastatin
Lovenox qd  DASH/TLC diet
resume simvastatin
Lovenox qd  DASH/TLC diet
resume simvastatin

## 2020-12-28 NOTE — PROGRESS NOTE ADULT - ASSESSMENT
60F with PMHx breast cancer on chemotherapy, HLD presenting with erythema, pain, fever to right chest port site x1 week. Suspected cellulitis/port infection
Mrs Damon is a 60 year old woman with breast cancer who presented with erythema at her port site and fevers. Had a local port infection in November as well.  MSSA grew in admission blood cultures  s/p port removal   port cultures are testing  cleared bacteremia 12/23  TTE no vegetation noted.    Overall sepsis, fever, tachy, secondary to port infection with surrounding cellulitis.   MSSA bacteremia. likely due to port infection, s/p removal.   resolved sepsis,   improved.  bacteremia cleared.     Suggest:  -follow final port cultures  - c/w cefazolin 2 gm iv q8h   - duration x 4 weeks (1/20/2021)  -no objection to PIC placement  -cbc, cmp weekly to our office [phone]    Mary Munguia MD  Pager: [phone]  After 5 PM or weekends please call fellow on call or office [phone]  
60F with PMHx breast cancer on chemotherapy, HLD presenting with erythema, pain, fever to right chest port site x1 week.     + Staph aureus bacteremia  -TTE pending  -s/p Vanc/Cefepime no won Ancef  -Appreciate ID consult recs, will need extended course of IV anx  -Will likely need PICC placed, pending ID clearance for when to place  -F/ u repeat cultures  -Rest of care as primary team  -Patient to followup with Dr. Cassie Bell ([facility]) upon discharge  -C/w Supportive care, pain control, Nutrition, PT, DVT ppx  -Oncology will continue to follow with you      Case d/w Dr. Елена LEDBETTER  Oncology Physician Assistant  Shirley THOMAS/[facility]  Pager [phone]    If after 5pm or weekends please page On-call Oncology Fellow  
Mrs Damon is a 60 year old woman with breast cancer who presented with erythema at her port site and fevers. Had a local port infection in November as well.  Febrile to 101.3. No leukocytosis.   Will need port removal.    Overall sepsis, fever, tachy, secondary to port infection with surrounding cellulitis.   MSSA bacteremia. likely due to port infection, s/p removal.   resolved sepsis, clinically better.       Plan:   - c/w cefazolin 2 gm iv q8h   - repeat blood cx, NTD   - will need a TTE   - s/p IR port removal, follow cx from port, negative so far.   - Trend WBCs, pt leucopenic now   - given pt immunosuppressed with recent chemo and high grade MSSA bacteremia where we are not pursuing BRUNILDA, will need 4 weeks of abx  - will need another midline or PICC once repeat blood cx negative at least 72 hrs.   
Mrs Damon is a 60 year old woman with breast cancer who presented with erythema at her port site and fevers. Had a local port infection in November as well.  Febrile to 101.3. No leukocytosis.   Will need port removal.    Overall sepsis, fever, tachy, secondary to port infection with surrounding cellulitis.   new MSSA bacteremia. likely due to port infection, s/p removal.     Plan:   - stopped vanco and cefepime, started cefazolin 2 gm iv q8h   - repeat blood cx  - will need a TTE   - s/p IR port removal, follow cx from port   - Trend WBCs    discussed need for negative cx and iv abx at home and possibility of holding off on chemo until abx are done as plan, pt upset, wants to talk to her oncologist, ridiculed by the fact that she might need abx daily for 4 weeks. 
60F with PMHx breast cancer on chemotherapy, HLD presenting with erythema, pain, fever to right chest port site x1 week. Suspected cellulitis/port infection

## 2020-12-28 NOTE — PROGRESS NOTE ADULT - SUBJECTIVE AND OBJECTIVE BOX
Wallace Laird MD  Academic Hospitalist  Pager [phone]  Email: [email or web address]          PROGRESS NOTE:     Patient is a 60y old  Female who presents with a chief complaint of erythema, pain to right chest port site x1 week (22 Dec 2020 18:21)      SUBJECTIVE / OVERNIGHT EVENTS:  Patient seen and examined this morning. States that she does feel better since the initiation of antibiotics and removal of port. She denies any worsening fevers, chills, n/v or shortness of breath.       MEDICATIONS  (STANDING):  ceFAZolin   IVPB 2000 milliGRAM(s) IV Intermittent every 8 hours  chlorhexidine 4% Liquid 1 Application(s) Topical daily  pantoprazole    Tablet 40 milliGRAM(s) Oral before breakfast  simvastatin 20 milliGRAM(s) Oral at bedtime  sodium chloride 0.9%. 1000 milliLiter(s) (150 mL/Hr) IV Continuous <Continuous>    MEDICATIONS  (PRN):  acetaminophen   Tablet .. 650 milliGRAM(s) Oral every 6 hours PRN Temp greater or equal to 38C (100.4F), Mild Pain (1 - 3)      CAPILLARY BLOOD GLUCOSE        I&O's Summary    22 Dec 2020 07:01  -  23 Dec 2020 07:00  --------------------------------------------------------  IN: 2686 mL / OUT: 0 mL / NET: 2686 mL        PHYSICAL EXAM:  Vital Signs Last 24 Hrs  T(C): 37.1 (23 Dec 2020 05:09), Max: 37.1 (22 Dec 2020 15:16)  T(F): 98.7 (23 Dec 2020 05:09), Max: 98.8 (22 Dec 2020 15:16)  HR: 92 (23 Dec 2020 05:09) (92 - 95)  BP: 106/67 (23 Dec 2020 05:09) (100/61 - 107/72)  BP(mean): --  RR: 18 (23 Dec 2020 05:09) (18 - 18)  SpO2: 99% (23 Dec 2020 05:09) (98% - 99%)    CONSTITUTIONAL: NAD, somewhat sickly appearing  RESPIRATORY: Normal respiratory effort; lungs are clear to auscultation bilaterally  CARDIOVASCULAR: Regular rate and rhythm, normal S1 and S2, no murmur/rub/gallop; No lower extremity edema; Peripheral pulses are 2+ bilaterally  ABDOMEN: Nontender to palpation, normoactive bowel sounds, no rebound/guarding; No hepatosplenomegaly  SKIN:  erythema in right chest port site improving, +TTP, no drainage, no fluctuance, no ulcerations  MUSCLOSKELETAL: no clubbing or cyanosis of digits; no joint swelling or tenderness to palpation  PSYCH: A+O to person, place, and time; affect appropriate    LABS:                        7.9    3.21  )-----------( 199      ( 23 Dec 2020 07:59 )             24.4     12-23    140  |  106  |  5<L>  ----------------------------<  106<H>  3.4<L>   |  25  |  0.60    Ca    8.8      23 Dec 2020 07:59  Phos  2.4     12-23  Mg     1.8     12-23      PT/INR - ( 22 Dec 2020 08:03 )   PT: 15.4 sec;   INR: 1.37 ratio                   Culture - Fungal, Body Fluid (collected 22 Dec 2020 18:22)  Source: .Body Fluid [facility] PORT  Preliminary Report (23 Dec 2020 08:15):    Testing in progress    Culture - Body Fluid with Gram Stain (collected 22 Dec 2020 18:22)  Source: .Body Fluid [facility] PORT  Gram Stain (23 Dec 2020 00:47):    Rare polymorphonuclear leukocytes seen per low power field    No organisms seen per oil power field    Culture - Acid Fast - Body Fluid w/Smear (collected 22 Dec 2020 18:22)  Source: .Body Fluid [facility] PORT    Culture - Blood (collected 21 Dec 2020 03:18)  Source: .Blood Blood-Peripheral  Gram Stain (22 Dec 2020 04:56):    Growth in anaerobic bottle: Gram Positive Cocci in Clusters  Preliminary Report (23 Dec 2020 11:28):    Growth in anaerobic bottle: Staphylococcus aureus    See previous culture [id number]    Culture - Blood (collected 21 Dec 2020 03:18)  Source: .Blood Blood-Peripheral  Gram Stain (22 Dec 2020 02:43):    Growth in aerobic bottle: Gram Positive Cocci in Clusters    Growth in anaerobic bottle: Gram Positive Cocci in Clusters  Preliminary Report (22 Dec 2020 23:32):    Growth in aerobic and anaerobic bottles: Staphylococcus aureus    "Due to technical problems, Proteus sp. and Pseudomonas    aeruginosa will Not be reported as part of the BCID panel    until further notice".    ***Blood Panel PCR results on this specimen are available    approximately 3 hours after the Gram stain result.***    Gram stain, PCR, and/or culture results may not always    correspond due to difference in methodologies.    ************************************************************    This PCR assay was performed using [employer].    The following targets are tested for: Enterococcus,    vancomycin resistant enterococci, Listeria monocytogenes,    coagulase negative staphylococci, S. aureus,    methicillin resistant S. aureus, Streptococcus agalactiae    (Group B), S. pneumoniae, S. pyogenes (Group A),    Acinetobacter baumannii, Enterobacter cloacae, E. coli,    Klebsiella oxytoca, K. pneumoniae, Proteus sp.,    Serratia marcescens, Haemophilus influenzae,    Neisseria meningitidis, Pseudomonas aeruginosa, Candida    albicans, C. glabrata, C krusei, C parapsilosis,    C. tropicalis and the KPC resistance gene.  Organism: Blood Culture PCR (21 Dec 2020 18:50)  Organism: Blood Culture PCR (21 Dec 2020 18:50)    Culture - Urine (collected 21 Dec 2020 02:00)  Source: .Urine Clean Catch (Midstream)  Final Report (22 Dec 2020 08:17):    <10,000 CFU/mL Normal Urogenital Nikki        RADIOLOGY & ADDITIONAL TESTS:  Results Reviewed:   Imaging Personally Reviewed:  Electrocardiogram Personally Reviewed:    COORDINATION OF CARE:  Care Discussed with Consultants/Other Providers [Y/N]: Patient care discussed with oncology and palliative care during interdisciplinary rounds, case management, nursing management  and social work were also present.   Prior or Outpatient Records Reviewed [Y/N]:  
60y old  Female who presents with a chief complaint of erythema, pain to right chest port site x1 week (22 Dec 2020 13:31)      Interval history:  Febrile, but feels better, s/p removal of port.       Allergies:   No Known Allergies      Antimicrobials:  ceFAZolin   IVPB 2000 milliGRAM(s) IV Intermittent every 8 hours      REVIEW OF SYSTEMS:  No cough, no SOB  No N/V/D, no abdominal pain  No dysuria or frequency  No rash.     Vital Signs Last 24 Hrs  T(C): 37.1 (12-22-20 @ 15:16), Max: 39.4 (12-21-20 @ 21:43)  T(F): 98.8 (12-22-20 @ 15:16), Max: 102.9 (12-21-20 @ 21:43)  HR: 95 (12-22-20 @ 15:16) (95 - 126)  BP: 100/61 (12-22-20 @ 15:16) (100/61 - 110/54)  BP(mean): --  RR: 18 (12-22-20 @ 15:16) (16 - 18)  SpO2: 99% (12-22-20 @ 15:16) (95% - 99%)      PHYSICAL EXAM:  Patient in no acute distress. AAOX3.  old port site with almost resolved erythema.   Cardiovascular: S1S2 normal.  Lungs: Good air entry B/L lung fields.  Gastrointestinal: soft, nontender, nondistended.  Extremities: no edema.  IV sites not inflamed.                           8.1    6.71  )-----------( 194      ( 22 Dec 2020 08:03 )             25.0   12-22    140  |  105  |  7   ----------------------------<  117<H>  3.2<L>   |  24  |  0.72    Ca    8.7      22 Dec 2020 08:03  Phos  1.9     12-22  Mg     1.5     12-22    TPro  6.7  /  Alb  4.1  /  TBili  0.2  /  DBili  x   /  AST  20  /  ALT  27  /  AlkPhos  71  12-20      LIVER FUNCTIONS - ( 20 Dec 2020 22:20 )  Alb: 4.1 g/dL / Pro: 6.7 g/dL / ALK PHOS: 71 U/L / ALT: 27 U/L / AST: 20 U/L / GGT: x               Culture - Blood (collected 21 Dec 2020 03:18)  Source: .Blood Blood-Peripheral  Gram Stain (22 Dec 2020 04:56):    Growth in anaerobic bottle: Gram Positive Cocci in Clusters  Preliminary Report (22 Dec 2020 04:57):    Growth in anaerobic bottle: Gram Positive Cocci in Clusters    Culture - Blood (collected 21 Dec 2020 03:18)  Source: .Blood Blood-Peripheral  Gram Stain (22 Dec 2020 02:43):    Growth in aerobic bottle: Gram Positive Cocci in Clusters    Growth in anaerobic bottle: Gram Positive Cocci in Clusters  Preliminary Report (22 Dec 2020 16:05):    Growth in aerobic bottle: Staphylococcus aureus    Growth in anaerobic bottle: Gram Positive Cocci in Clusters    "Due to technical problems, Proteus sp. and Pseudomonas    aeruginosa will Not be reported as part of the BCID panel    until further notice".    ***Blood Panel PCR results on this specimen are available    approximately 3 hours after the Gram stain result.***    Gram stain, PCR, and/or culture results may not always    correspond due to difference in methodologies.    ************************************************************    This PCR assay was performed using [employer].    The following targets are tested for: Enterococcus,    vancomycin resistant enterococci, Listeria monocytogenes,    coagulase negative staphylococci, S. aureus,    methicillin resistant S. aureus, Streptococcus agalactiae    (Group B), S. pneumoniae, S. pyogenes (Group A),    Acinetobacter baumannii, Enterobacter cloacae, E. coli,    Klebsiella oxytoca, K. pneumoniae, Proteus sp.,    Serratia marcescens, Haemophilus influenzae,    Neisseria meningitidis, Pseudomonas aeruginosa, Candida    albicans, C. glabrata, C krusei, C parapsilosis,    C. tropicalis and the KPC resistance gene.  Organism: Blood Culture PCR (21 Dec 2020 18:50)  Organism: Blood Culture PCR (21 Dec 2020 18:50)    Culture - Urine (collected 21 Dec 2020 02:00)  Source: .Urine Clean Catch (Midstream)  Final Report (22 Dec 2020 08:17):    <10,000 CFU/mL Normal Urogenital Nikki            
60y old  Female who presents with a chief complaint of erythema, pain to right chest port site x1 week (24 Dec 2020 10:51)      Interval history:  Afebrile, feeling ok, upset about not being able to go home over the marie.       Allergies:   No Known Allergies    Antimicrobials:  ceFAZolin   IVPB 2000 milliGRAM(s) IV Intermittent every 8 hours      REVIEW OF SYSTEMS:  No SOB  No N/V/D, no abdominal pain  No dysuria  No rash.       Vital Signs Last 24 Hrs  T(C): 36.9 (12-24-20 @ 13:15), Max: 37.1 (12-24-20 @ 06:33)  T(F): 98.4 (12-24-20 @ 13:15), Max: 98.7 (12-24-20 @ 06:33)  HR: 88 (12-24-20 @ 13:15) (88 - 93)  BP: 119/81 (12-24-20 @ 13:15) (101/64 - 119/81)  BP(mean): --  RR: 18 (12-24-20 @ 13:15) (18 - 18)  SpO2: 98% (12-24-20 @ 13:15) (98% - 98%)      PHYSICAL EXAM:  Patient in no acute distress. AAOX3.  old port site with resolved erythema.   Cardiovascular: S1S2 normal.  Lungs: Good air entry B/L lung fields.  Gastrointestinal: soft, nontender, nondistended.  Extremities: no edema.  IV sites not inflamed.                           8.3    2.96  )-----------( 219      ( 24 Dec 2020 06:04 )             25.5   12-24    142  |  109<H>  |  6<L>  ----------------------------<  116<H>  3.6   |  23  |  0.57    Ca    8.8      24 Dec 2020 06:04  Phos  3.5     12-24  Mg     1.7     12-24      Culture - Blood (collected 23 Dec 2020 08:29)  Source: .Blood Blood-Venous  Preliminary Report (24 Dec 2020 09:01):    No growth to date.    Culture - Blood (collected 23 Dec 2020 08:29)  Source: .Blood Blood-Peripheral  Preliminary Report (24 Dec 2020 09:01):    No growth to date.    Culture - Fungal, Body Fluid (collected 22 Dec 2020 18:22)  Source: .Body Fluid [facility]  Preliminary Report (23 Dec 2020 08:15):    Testing in progress    Culture - Body Fluid with Gram Stain (collected 22 Dec 2020 18:22)  Source: .Body Fluid [facility] PORT  Gram Stain (23 Dec 2020 00:47):    Rare polymorphonuclear leukocytes seen per low power field    No organisms seen per oil power field  Preliminary Report (23 Dec 2020 17:43):    No growth    Culture - Acid Fast - Body Fluid w/Smear (collected 22 Dec 2020 18:22)  Source: .Body Fluid [facility]          
60y old  Female who presents with a chief complaint of erythema, pain to right chest port site x1 week (24 Dec 2020 10:51)      Interval history:  Afebrile.  anxious to go home. blood cultures 12/23 no growth      Allergies:   No Known Allergies    Antimicrobials:  ceFAZolin   IVPB 2000 milliGRAM(s) IV Intermittent every 8 hours      REVIEW OF SYSTEMS:  No SOB  No N/V/D, no abdominal pain  No dysuria  No rash.       Vital Signs Last 24 Hrs  T(F): 99 (12-28-20 @ 13:22), Max: 99 (12-28-20 @ 13:22)  HR: 92 (12-28-20 @ 13:22)  BP: 108/66 (12-28-20 @ 13:22)  RR: 18 (12-28-20 @ 13:22)  SpO2: 97% (12-28-20 @ 13:22) (97% - 100%)      PHYSICAL EXAM:  Patient in no acute distress. AAOX3.  right chest wall dressing  Cardiovascular: S1S2 normal.  Lungs: Good air entry B/L lung fields.  Gastrointestinal: soft, nontender, nondistended.  Extremities: no edema.  peripheral iv left no erythema                          8.9    6.76  )-----------( 448      ( 28 Dec 2020 06:47 )             27.7 12-28    140  |  105  |  10  ----------------------------<  101  4.0   |  25  |  0.69  Ca    9.6      28 Dec 2020 06:47Phos  4.2     12-28Mg     1.7     12-28      lCulture - Blood (12.23.20 @ 07:26)   Specimen Source: .Blood Blood-Peripheral   Culture Results:   No Growth Final Culture - Blood (12.23.20 @ 06:59)   Specimen Source: .Blood Blood-Venous   Culture Results:   No Growth Final Culture - Fungal, Body Fluid (12.22.20 @ 18:22)   Specimen Source: .Body Fluid [facility] PORT   Culture Results:   Testing in progress Culture - Acid Fast - Body Fluid w/Smear (12.22.20 @ 18:22)   Specimen Source: .Body Fluid [facility] PORT   Acid Fast Bacilli Smear:   No acid fast bacilli seen by fluorochrome stain   Culture Results:   Culture is being performed. Culture - Body Fluid with Gram Stain (12.22.20 @ 12:00)   Gram Stain:   Rare polymorphonuclear leukocytes seen per low power field   No organisms seen per oil power field   Specimen Source: .Body Fluid MIDI PORT   Culture Results:   No growth       
INTERVAL HPI/OVERNIGHT EVENTS:  Patient seen at bedside.  Patient s/p port removal  Afebrile overnight  with nosebleed    VITAL SIGNS:  T(F): 98.6 (12-23-20 @ 13:06)  HR: 102 (12-23-20 @ 13:06)  BP: 124/86 (12-23-20 @ 13:06)  RR: 18 (12-23-20 @ 13:06)  SpO2: 97% (12-23-20 @ 13:06)  Wt(kg): --    PHYSICAL EXAM:    In accordance with current standard limiting patient contact, deferred physical exam  2/2 COVID pandemic  Please refer to physical exam of primary team.    MEDICATIONS  (STANDING):  ceFAZolin   IVPB 2000 milliGRAM(s) IV Intermittent every 8 hours  chlorhexidine 4% Liquid 1 Application(s) Topical daily  pantoprazole    Tablet 40 milliGRAM(s) Oral before breakfast  simvastatin 20 milliGRAM(s) Oral at bedtime  sodium chloride 0.9%. 1000 milliLiter(s) (150 mL/Hr) IV Continuous <Continuous>    MEDICATIONS  (PRN):  acetaminophen   Tablet .. 650 milliGRAM(s) Oral every 6 hours PRN Temp greater or equal to 38C (100.4F), Mild Pain (1 - 3)      Allergies    No Known Allergies    Intolerances        LABS:                        7.9    3.21  )-----------( 199      ( 23 Dec 2020 07:59 )             24.4     12-23    140  |  106  |  5<L>  ----------------------------<  106<H>  3.4<L>   |  25  |  0.60    Ca    8.8      23 Dec 2020 07:59  Phos  2.4     12-23  Mg     1.8     12-23      PT/INR - ( 22 Dec 2020 08:03 )   PT: 15.4 sec;   INR: 1.37 ratio               RADIOLOGY & ADDITIONAL TESTS:  Studies reviewed.  
Wallace Laird MD  Academic Hospitalist  Pager [phone]  Email: [email or web address]          PROGRESS NOTE:     Patient is a 60y old  Female who presents with a chief complaint of erythema, pain to right chest port site x1 week (23 Dec 2020 15:38)      SUBJECTIVE / OVERNIGHT EVENTS:  Patient seen and examined this morning. States that she feels better, she denies shortness of breath, or further f/c/n/v. If possible, she would like to get on [place].        MEDICATIONS  (STANDING):  ceFAZolin   IVPB 2000 milliGRAM(s) IV Intermittent every 8 hours  chlorhexidine 4% Liquid 1 Application(s) Topical daily  enoxaparin Injectable 40 milliGRAM(s) SubCutaneous daily  pantoprazole    Tablet 40 milliGRAM(s) Oral before breakfast  simvastatin 20 milliGRAM(s) Oral at bedtime  sodium chloride 0.9%. 1000 milliLiter(s) (75 mL/Hr) IV Continuous <Continuous>    MEDICATIONS  (PRN):  acetaminophen   Tablet .. 650 milliGRAM(s) Oral every 6 hours PRN Temp greater or equal to 38C (100.4F), Mild Pain (1 - 3)      CAPILLARY BLOOD GLUCOSE        I&O's Summary      PHYSICAL EXAM:  Vital Signs Last 24 Hrs  T(C): 37.1 (24 Dec 2020 06:33), Max: 37.1 (24 Dec 2020 06:33)  T(F): 98.7 (24 Dec 2020 06:33), Max: 98.7 (24 Dec 2020 06:33)  HR: 88 (24 Dec 2020 06:33) (88 - 102)  BP: 101/64 (24 Dec 2020 06:33) (101/64 - 124/86)  BP(mean): --  RR: 18 (24 Dec 2020 06:33) (18 - 18)  SpO2: 98% (24 Dec 2020 06:33) (97% - 98%)    CONSTITUTIONAL: NAD, somewhat sickly appearing  RESPIRATORY: Normal respiratory effort; lungs are clear to auscultation bilaterally  CARDIOVASCULAR: Regular rate and rhythm, normal S1 and S2, no murmur/rub/gallop; No lower extremity edema; Peripheral pulses are 2+ bilaterally  ABDOMEN: Nontender to palpation, normoactive bowel sounds, no rebound/guarding; No hepatosplenomegaly  SKIN:  erythema in right chest port site improving, +TTP, no drainage, no fluctuance, no ulcerations  MUSCLOSKELETAL: no clubbing or cyanosis of digits; no joint swelling or tenderness to palpation  PSYCH: A+O to person, place, and time; affect appropriate    LABS:                        8.3    2.96  )-----------( 219      ( 24 Dec 2020 06:04 )             25.5     12-24    142  |  109<H>  |  6<L>  ----------------------------<  116<H>  3.6   |  23  |  0.57    Ca    8.8      24 Dec 2020 06:04  Phos  3.5     12-24  Mg     1.7     12-24                Culture - Blood (collected 23 Dec 2020 08:29)  Source: .Blood Blood-Venous  Preliminary Report (24 Dec 2020 09:01):    No growth to date.    Culture - Blood (collected 23 Dec 2020 08:29)  Source: .Blood Blood-Peripheral  Preliminary Report (24 Dec 2020 09:01):    No growth to date.    Culture - Fungal, Body Fluid (collected 22 Dec 2020 18:22)  Source: .Body Fluid [facility]  Preliminary Report (23 Dec 2020 08:15):    Testing in progress    Culture - Body Fluid with Gram Stain (collected 22 Dec 2020 18:22)  Source: .Body Fluid [facility] PORT  Gram Stain (23 Dec 2020 00:47):    Rare polymorphonuclear leukocytes seen per low power field    No organisms seen per oil power field  Preliminary Report (23 Dec 2020 17:43):    No growth    Culture - Acid Fast - Body Fluid w/Smear (collected 22 Dec 2020 18:22)  Source: .Body Fluid [facility] PORT        RADIOLOGY & ADDITIONAL TESTS:  Results Reviewed:   Imaging Personally Reviewed:  Electrocardiogram Personally Reviewed:    COORDINATION OF CARE:  Care Discussed with Consultants/Other Providers [Y/N]: Patient care discussed with oncology and palliative care during interdisciplinary rounds, case management, nursing management  and social work were also present.   Prior or Outpatient Records Reviewed [Y/N]:  
Wallace Laird MD  Academic Hospitalist  Pager [phone]  Email: [email or web address]          PROGRESS NOTE:     Patient is a 60y old  Female who presents with a chief complaint of erythema, pain to right chest port site x1 week (21 Dec 2020 11:03)      SUBJECTIVE / OVERNIGHT EVENTS:  Patient seen and examined this morning. States like she does have fevers, though denies any trouble breathing, n/v. The patient would like to go home for Seth.       MEDICATIONS  (STANDING):  cefepime   IVPB 2000 milliGRAM(s) IV Intermittent every 8 hours  enoxaparin Injectable 40 milliGRAM(s) SubCutaneous daily  pantoprazole    Tablet 40 milliGRAM(s) Oral before breakfast  simvastatin 20 milliGRAM(s) Oral at bedtime  sodium chloride 0.9%. 1000 milliLiter(s) (150 mL/Hr) IV Continuous <Continuous>  vancomycin  IVPB 1000 milliGRAM(s) IV Intermittent every 12 hours    MEDICATIONS  (PRN):  acetaminophen   Tablet .. 650 milliGRAM(s) Oral every 6 hours PRN Temp greater or equal to 38C (100.4F)      CAPILLARY BLOOD GLUCOSE        I&O's Summary      PHYSICAL EXAM:  Vital Signs Last 24 Hrs  T(C): 38.8 (21 Dec 2020 13:03), Max: 38.8 (21 Dec 2020 13:03)  T(F): 101.9 (21 Dec 2020 13:03), Max: 101.9 (21 Dec 2020 13:03)  HR: 117 (21 Dec 2020 13:03) (116 - 130)  BP: 123/71 (21 Dec 2020 13:03) (101/57 - 142/71)  BP(mean): --  RR: 18 (21 Dec 2020 06:35) (17 - 18)  SpO2: 95% (21 Dec 2020 13:03) (95% - 100%)    CONSTITUTIONAL: NAD, somewhat sickly appearing  RESPIRATORY: Normal respiratory effort; lungs are clear to auscultation bilaterally  CARDIOVASCULAR: Regular rate and rhythm, normal S1 and S2, no murmur/rub/gallop; No lower extremity edema; Peripheral pulses are 2+ bilaterally  ABDOMEN: Nontender to palpation, normoactive bowel sounds, no rebound/guarding; No hepatosplenomegaly  SKIN: Mild erythema to right chest port site, +TTP, no drainage, no fluctuance, no ulcerations  MUSCLOSKELETAL: no clubbing or cyanosis of digits; no joint swelling or tenderness to palpation  PSYCH: A+O to person, place, and time; affect appropriate    LABS:                        8.1    7.56  )-----------( 190      ( 21 Dec 2020 08:52 )             25.9     12-    141  |  108<H>  |  9   ----------------------------<  118<H>  3.5   |  22  |  0.75    Ca    8.7      21 Dec 2020 08:52    TPro  6.7  /  Alb  4.1  /  TBili  0.2  /  DBili  x   /  AST  20  /  ALT  27  /  AlkPhos  71  12-20    PT/INR - ( 20 Dec 2020 22:20 )   PT: 12.8 sec;   INR: 1.12 ratio         PTT - ( 20 Dec 2020 22:20 )  PTT:27.8 sec      Urinalysis Basic - ( 21 Dec 2020 02:16 )    Color: Colorless / Appearance: Clear / S.008 / pH: x  Gluc: x / Ketone: Negative  / Bili: Negative / Urobili: <2 mg/dL   Blood: x / Protein: Negative / Nitrite: Negative   Leuk Esterase: Negative / RBC: x / WBC x   Sq Epi: x / Non Sq Epi: x / Bacteria: x          RADIOLOGY & ADDITIONAL TESTS:  Results Reviewed:   Imaging Personally Reviewed:  Electrocardiogram Personally Reviewed:    COORDINATION OF CARE:  Care Discussed with Consultants/Other Providers [Y/N]: Patient care discussed with oncology and palliative care during interdisciplinary rounds, case management, nursing management  and social work were also present.   Prior or Outpatient Records Reviewed [Y/N]:  
Wallace Laird MD  Academic Hospitalist  Pager [phone]  Email: [email or web address]          PROGRESS NOTE:     Patient is a 60y old  Female who presents with a chief complaint of erythema, pain to right chest port site x1 week (28 Dec 2020 11:41)      SUBJECTIVE / OVERNIGHT EVENTS:  Patient seen and examined this morning. No overnight events. Looks forward to going home.  She requests that her dressing over her old port sight be changed. Denies any chest pain, shortness of breath, fevers, chills, nausea or vomiting.      MEDICATIONS  (STANDING):  ceFAZolin   IVPB 2000 milliGRAM(s) IV Intermittent every 8 hours  chlorhexidine 4% Liquid 1 Application(s) Topical daily  pantoprazole    Tablet 40 milliGRAM(s) Oral before breakfast  simvastatin 20 milliGRAM(s) Oral at bedtime    MEDICATIONS  (PRN):  acetaminophen   Tablet .. 650 milliGRAM(s) Oral every 6 hours PRN Temp greater or equal to 38C (100.4F), Mild Pain (1 - 3)      CAPILLARY BLOOD GLUCOSE        I&O's Summary    27 Dec 2020 07:01  -  28 Dec 2020 07:00  --------------------------------------------------------  IN: 3200 mL / OUT: 700 mL / NET: 2500 mL        PHYSICAL EXAM:  Vital Signs Last 24 Hrs  T(C): 37.2 (28 Dec 2020 13:22), Max: 37.2 (28 Dec 2020 13:22)  T(F): 99 (28 Dec 2020 13:22), Max: 99 (28 Dec 2020 13:22)  HR: 92 (28 Dec 2020 13:22) (85 - 92)  BP: 108/66 (28 Dec 2020 13:22) (108/66 - 118/71)  BP(mean): --  RR: 18 (28 Dec 2020 13:22) (17 - 18)  SpO2: 97% (28 Dec 2020 13:22) (97% - 100%)    CONSTITUTIONAL: NAD, upbeat about discharge  RESPIRATORY: Normal respiratory effort; lungs are clear to auscultation bilaterally  CARDIOVASCULAR: Regular rate and rhythm, normal S1 and S2, no murmur/rub/gallop; No lower extremity edema; Peripheral pulses are 2+ bilaterally  ABDOMEN: Nontender to palpation, normoactive bowel sounds, no rebound/guarding; No hepatosplenomegaly  SKIN:  erythema in right chest port site resolved, no drainage, no fluctuance, no ulcerations  MUSCLOSKELETAL: no clubbing or cyanosis of digits; no joint swelling or tenderness to palpation  PSYCH: A+O to person, place, and time; affect appropriate    LABS:                        8.9    6.76  )-----------( 448      ( 28 Dec 2020 06:47 )             27.7     12-28    140  |  105  |  10  ----------------------------<  101<H>  4.0   |  25  |  0.69    Ca    9.6      28 Dec 2020 06:47  Phos  4.2     12-28  Mg     1.7     12-28                  RADIOLOGY & ADDITIONAL TESTS:  Results Reviewed:   Imaging Personally Reviewed:  Electrocardiogram Personally Reviewed:    COORDINATION OF CARE:  Care Discussed with Consultants/Other Providers [Y/N]: Patient care discussed with oncology and palliative care during interdisciplinary rounds, case management, nursing management  and social work were also present.   Prior or Outpatient Records Reviewed [Y/N]:  
Wallace Laird MD  Academic Hospitalist  Pager [phone]  Email: [email or web address]          PROGRESS NOTE:     Patient is a 60y old  Female who presents with a chief complaint of erythema, pain to right chest port site x1 week (25 Dec 2020 12:34)      SUBJECTIVE / OVERNIGHT EVENTS:  Patient seen and examined this morning. No overnight events and overall continues to feel better since being admitted. Seen exercising in her room. Denies any chest pain, shortness of breath, fevers, chills, nausea or vomiting.        MEDICATIONS  (STANDING):  ceFAZolin   IVPB 2000 milliGRAM(s) IV Intermittent every 8 hours  chlorhexidine 4% Liquid 1 Application(s) Topical daily  enoxaparin Injectable 40 milliGRAM(s) SubCutaneous daily  pantoprazole    Tablet 40 milliGRAM(s) Oral before breakfast  simvastatin 20 milliGRAM(s) Oral at bedtime  sodium chloride 0.9%. 1000 milliLiter(s) (75 mL/Hr) IV Continuous <Continuous>    MEDICATIONS  (PRN):  acetaminophen   Tablet .. 650 milliGRAM(s) Oral every 6 hours PRN Temp greater or equal to 38C (100.4F), Mild Pain (1 - 3)      CAPILLARY BLOOD GLUCOSE        I&O's Summary    25 Dec 2020 07:01  -  26 Dec 2020 07:00  --------------------------------------------------------  IN: 1320 mL / OUT: 1350 mL / NET: -30 mL        PHYSICAL EXAM:  Vital Signs Last 24 Hrs  T(C): 37.1 (26 Dec 2020 11:39), Max: 37.1 (26 Dec 2020 11:39)  T(F): 98.7 (26 Dec 2020 11:39), Max: 98.7 (26 Dec 2020 11:39)  HR: 83 (26 Dec 2020 11:39) (83 - 85)  BP: 107/72 (26 Dec 2020 11:39) (106/62 - 119/68)  BP(mean): --  RR: 18 (26 Dec 2020 11:39) (17 - 18)  SpO2: 99% (26 Dec 2020 11:39) (95% - 99%)    CONSTITUTIONAL: NAD, looks more energetic today  RESPIRATORY: Normal respiratory effort; lungs are clear to auscultation bilaterally  CARDIOVASCULAR: Regular rate and rhythm, normal S1 and S2, no murmur/rub/gallop; No lower extremity edema; Peripheral pulses are 2+ bilaterally  ABDOMEN: Nontender to palpation, normoactive bowel sounds, no rebound/guarding; No hepatosplenomegaly  SKIN:  erythema in right chest port site improving, less TTP, no drainage, no fluctuance, no ulcerations  MUSCLOSKELETAL: no clubbing or cyanosis of digits; no joint swelling or tenderness to palpation  PSYCH: A+O to person, place, and time; affect appropriate    LABS:                        8.0    3.67  )-----------( 350      ( 26 Dec 2020 09:38 )             25.4     12-26    143  |  107  |  7   ----------------------------<  99  3.8   |  25  |  0.58    Ca    9.3      26 Dec 2020 09:38  Phos  4.6     12-26  Mg     1.7     12-26                  RADIOLOGY & ADDITIONAL TESTS:  Results Reviewed:   Imaging Personally Reviewed:  Electrocardiogram Personally Reviewed:    COORDINATION OF CARE:  Care Discussed with Consultants/Other Providers [Y/N]:  Prior or Outpatient Records Reviewed [Y/N]:  
Wallace Laird MD  Academic Hospitalist  Pager [phone]  Email: [email or web address]          PROGRESS NOTE:     Patient is a 60y old  Female who presents with a chief complaint of erythema, pain to right chest port site x1 week (24 Dec 2020 16:48)      SUBJECTIVE / OVERNIGHT EVENTS:  Patient seen and examined this morning. No overnight events and overall continues to feel better since being admitted. Denies any chest pain, shortness of breath, fevers, chills, nausea or vomiting.      MEDICATIONS  (STANDING):  ceFAZolin   IVPB 2000 milliGRAM(s) IV Intermittent every 8 hours  chlorhexidine 4% Liquid 1 Application(s) Topical daily  enoxaparin Injectable 40 milliGRAM(s) SubCutaneous daily  pantoprazole    Tablet 40 milliGRAM(s) Oral before breakfast  simvastatin 20 milliGRAM(s) Oral at bedtime  sodium chloride 0.9%. 1000 milliLiter(s) (75 mL/Hr) IV Continuous <Continuous>    MEDICATIONS  (PRN):  acetaminophen   Tablet .. 650 milliGRAM(s) Oral every 6 hours PRN Temp greater or equal to 38C (100.4F), Mild Pain (1 - 3)      CAPILLARY BLOOD GLUCOSE        I&O's Summary    24 Dec 2020 07:01  -  25 Dec 2020 07:00  --------------------------------------------------------  IN: 890 mL / OUT: 1500 mL / NET: -610 mL        PHYSICAL EXAM:  Vital Signs Last 24 Hrs  T(C): 36.8 (25 Dec 2020 06:24), Max: 37 (24 Dec 2020 21:06)  T(F): 98.2 (25 Dec 2020 06:24), Max: 98.6 (24 Dec 2020 21:06)  HR: 89 (25 Dec 2020 06:24) (88 - 99)  BP: 107/65 (25 Dec 2020 06:24) (107/65 - 119/81)  BP(mean): --  RR: 16 (25 Dec 2020 06:24) (16 - 19)  SpO2: 98% (25 Dec 2020 06:24) (97% - 98%)    CONSTITUTIONAL: NAD, somewhat sickly appearing  RESPIRATORY: Normal respiratory effort; lungs are clear to auscultation bilaterally  CARDIOVASCULAR: Regular rate and rhythm, normal S1 and S2, no murmur/rub/gallop; No lower extremity edema; Peripheral pulses are 2+ bilaterally  ABDOMEN: Nontender to palpation, normoactive bowel sounds, no rebound/guarding; No hepatosplenomegaly  SKIN:  erythema in right chest port site improving, less TTP, no drainage, no fluctuance, no ulcerations  MUSCLOSKELETAL: no clubbing or cyanosis of digits; no joint swelling or tenderness to palpation  PSYCH: A+O to person, place, and time; affect appropriate    LABS:                        8.3    2.96  )-----------( 219      ( 24 Dec 2020 06:04 )             25.5     12-24    142  |  109<H>  |  6<L>  ----------------------------<  116<H>  3.6   |  23  |  0.57    Ca    8.8      24 Dec 2020 06:04  Phos  3.5     12-24  Mg     1.7     12-24                Culture - Blood (collected 23 Dec 2020 08:29)  Source: .Blood Blood-Venous  Preliminary Report (24 Dec 2020 09:01):    No growth to date.    Culture - Blood (collected 23 Dec 2020 08:29)  Source: .Blood Blood-Peripheral  Preliminary Report (24 Dec 2020 09:01):    No growth to date.    Culture - Fungal, Body Fluid (collected 22 Dec 2020 18:22)  Source: .Body Fluid [facility]  Preliminary Report (23 Dec 2020 08:15):    Testing in progress    Culture - Body Fluid with Gram Stain (collected 22 Dec 2020 18:22)  Source: .Body Fluid [facility]  Gram Stain (23 Dec 2020 00:47):    Rare polymorphonuclear leukocytes seen per low power field    No organisms seen per oil power field  Preliminary Report (23 Dec 2020 17:43):    No growth    Culture - Acid Fast - Body Fluid w/Smear (collected 22 Dec 2020 18:22)  Source: .Body Fluid [facility]        RADIOLOGY & ADDITIONAL TESTS:  Results Reviewed:   Imaging Personally Reviewed:  Electrocardiogram Personally Reviewed:    COORDINATION OF CARE:  Care Discussed with Consultants/Other Providers [Y/N]:  Prior or Outpatient Records Reviewed [Y/N]:  
Wallace Laird MD  Academic Hospitalist  Pager [phone]  Email: [email or web address]          PROGRESS NOTE:     Patient is a 60y old  Female who presents with a chief complaint of erythema, pain to right chest port site x1 week (21 Dec 2020 16:54)      SUBJECTIVE / OVERNIGHT EVENTS:  Patient seen and examined this morning. She states that she does feel better since getting the antibiotics, however she still feels very weak. She would ideally like to go home before marie. She continues to have fevers, she denies f/c/n/v.       MEDICATIONS  (STANDING):  ceFAZolin   IVPB 2000 milliGRAM(s) IV Intermittent every 8 hours  chlorhexidine 4% Liquid 1 Application(s) Topical daily  pantoprazole    Tablet 40 milliGRAM(s) Oral before breakfast  potassium chloride  10 mEq/100 mL IVPB 10 milliEquivalent(s) IV Intermittent every 1 hour  potassium phosphate IVPB 15 milliMole(s) IV Intermittent once  simvastatin 20 milliGRAM(s) Oral at bedtime  sodium chloride 0.9%. 1000 milliLiter(s) (150 mL/Hr) IV Continuous <Continuous>    MEDICATIONS  (PRN):  acetaminophen   Tablet .. 650 milliGRAM(s) Oral every 6 hours PRN Temp greater or equal to 38C (100.4F), Mild Pain (1 - 3)      CAPILLARY BLOOD GLUCOSE        I&O's Summary    21 Dec 2020 07:  -  22 Dec 2020 07:00  --------------------------------------------------------  IN: 3640 mL / OUT: 0 mL / NET: 3640 mL    22 Dec 2020 07:  -  22 Dec 2020 13:32  --------------------------------------------------------  IN: 1050 mL / OUT: 0 mL / NET: 1050 mL        PHYSICAL EXAM:  Vital Signs Last 24 Hrs  T(C): 38.1 (22 Dec 2020 10:45), Max: 39.4 (21 Dec 2020 14:15)  T(F): 100.5 (22 Dec 2020 10:45), Max: 102.9 (21 Dec 2020 14:15)  HR: 106 (22 Dec 2020 06:19) (106 - 126)  BP: 104/65 (22 Dec 2020 06:19) (104/65 - 110/54)  BP(mean): --  RR: 16 (22 Dec 2020 06:19) (16 - 18)  SpO2: 96% (22 Dec 2020 06:19) (95% - 96%)    CONSTITUTIONAL: NAD, somewhat sickly appearing  RESPIRATORY: Normal respiratory effort; lungs are clear to auscultation bilaterally  CARDIOVASCULAR: Regular rate and rhythm, normal S1 and S2, no murmur/rub/gallop; No lower extremity edema; Peripheral pulses are 2+ bilaterally  ABDOMEN: Nontender to palpation, normoactive bowel sounds, no rebound/guarding; No hepatosplenomegaly  SKIN: Mild erythema to right chest port site, +TTP, no drainage, no fluctuance, no ulcerations  MUSCLOSKELETAL: no clubbing or cyanosis of digits; no joint swelling or tenderness to palpation  PSYCH: A+O to person, place, and time; affect appropriate  LABS:                        8.1    6.71  )-----------( 194      ( 22 Dec 2020 08:03 )             25.0     12-    140  |  105  |  7   ----------------------------<  117<H>  3.2<L>   |  24  |  0.72    Ca    8.7      22 Dec 2020 08:03  Phos  1.9     12-  Mg     1.5     12-    TPro  6.7  /  Alb  4.1  /  TBili  0.2  /  DBili  x   /  AST  20  /  ALT  27  /  AlkPhos  71  12-20    PT/INR - ( 22 Dec 2020 08:03 )   PT: 15.4 sec;   INR: 1.37 ratio         PTT - ( 20 Dec 2020 22:20 )  PTT:27.8 sec      Urinalysis Basic - ( 21 Dec 2020 02:16 )    Color: Colorless / Appearance: Clear / S.008 / pH: x  Gluc: x / Ketone: Negative  / Bili: Negative / Urobili: <2 mg/dL   Blood: x / Protein: Negative / Nitrite: Negative   Leuk Esterase: Negative / RBC: x / WBC x   Sq Epi: x / Non Sq Epi: x / Bacteria: x        Culture - Blood (collected 21 Dec 2020 03:18)  Source: .Blood Blood-Peripheral  Gram Stain (22 Dec 2020 04:56):    Growth in anaerobic bottle: Gram Positive Cocci in Clusters  Preliminary Report (22 Dec 2020 04:57):    Growth in anaerobic bottle: Gram Positive Cocci in Clusters    Culture - Blood (collected 21 Dec 2020 03:18)  Source: .Blood Blood-Peripheral  Gram Stain (22 Dec 2020 02:43):    Growth in aerobic bottle: Gram Positive Cocci in Clusters    Growth in anaerobic bottle: Gram Positive Cocci in Clusters  Preliminary Report (22 Dec 2020 02:43):    Growth in anaerobic bottle: Gram Positive Cocci in Clusters    Growth in aerobic bottle: Gram Positive Cocci in Clusters    "Due to technical problems, Proteus sp. and Pseudomonas    aeruginosa will Not be reported as part of the BCID panel    until further notice".    ***Blood Panel PCR results on this specimen are available    approximately 3 hours after the Gram stain result.***    Gram stain, PCR, and/or culture results may not always    correspond due to difference in methodologies.    ************************************************************    This PCR assay was performed using [employer].    The following targets are tested for: Enterococcus,    vancomycin resistant enterococci, Listeria monocytogenes,    coagulase negative staphylococci, S. aureus,    methicillin resistant S. aureus, Streptococcus agalactiae    (Group B), S. pneumoniae, S. pyogenes (Group A),    Acinetobacter baumannii, Enterobacter cloacae, E. coli,    Klebsiella oxytoca, K. pneumoniae, Proteus sp.,    Serratia marcescens, Haemophilus influenzae,    Neisseria meningitidis, Pseudomonas aeruginosa, Candida    albicans, C. glabrata, C krusei, C parapsilosis,    C. tropicalis and the KPC resistance gene.  Organism: Blood Culture PCR (21 Dec 2020 18:50)  Organism: Blood Culture PCR (21 Dec 2020 18:50)    Culture - Urine (collected 21 Dec 2020 02:00)  Source: .Urine Clean Catch (Midstream)  Final Report (22 Dec 2020 08:17):    <10,000 CFU/mL Normal Urogenital Nikki        RADIOLOGY & ADDITIONAL TESTS:  Results Reviewed:   Imaging Personally Reviewed:  Electrocardiogram Personally Reviewed:    COORDINATION OF CARE:  Care Discussed with Consultants/Other Providers [Y/N]: Patient care discussed with oncology and palliative care during interdisciplinary rounds, case management, nursing management  and social work were also present.   Prior or Outpatient Records Reviewed [Y/N]:  
Wallace Laird MD  Academic Hospitalist  Pager [phone]  Email: [email or web address]          PROGRESS NOTE:     Patient is a 60y old  Female who presents with a chief complaint of erythema, pain to right chest port site x1 week (26 Dec 2020 12:17)      SUBJECTIVE / OVERNIGHT EVENTS:  Patient seen and examined this morning. No overnight events. Denies any chest pain, shortness of breath, fevers, chills, nausea or vomiting.      MEDICATIONS  (STANDING):  ceFAZolin   IVPB 2000 milliGRAM(s) IV Intermittent every 8 hours  chlorhexidine 4% Liquid 1 Application(s) Topical daily  enoxaparin Injectable 40 milliGRAM(s) SubCutaneous daily  pantoprazole    Tablet 40 milliGRAM(s) Oral before breakfast  simvastatin 20 milliGRAM(s) Oral at bedtime  sodium chloride 0.9%. 1000 milliLiter(s) (75 mL/Hr) IV Continuous <Continuous>    MEDICATIONS  (PRN):  acetaminophen   Tablet .. 650 milliGRAM(s) Oral every 6 hours PRN Temp greater or equal to 38C (100.4F), Mild Pain (1 - 3)      CAPILLARY BLOOD GLUCOSE        I&O's Summary    26 Dec 2020 07:01  -  27 Dec 2020 07:00  --------------------------------------------------------  IN: 750 mL / OUT: 800 mL / NET: -50 mL        PHYSICAL EXAM:  Vital Signs Last 24 Hrs  T(C): 36.8 (27 Dec 2020 05:59), Max: 37.1 (26 Dec 2020 11:39)  T(F): 98.2 (27 Dec 2020 05:59), Max: 98.7 (26 Dec 2020 11:39)  HR: 76 (27 Dec 2020 05:59) (76 - 95)  BP: 102/73 (27 Dec 2020 05:59) (102/73 - 120/72)  BP(mean): --  RR: 16 (27 Dec 2020 05:59) (16 - 18)  SpO2: 95% (27 Dec 2020 05:59) (95% - 99%)    CONSTITUTIONAL: NAD, continues to looks more energetic today  RESPIRATORY: Normal respiratory effort; lungs are clear to auscultation bilaterally  CARDIOVASCULAR: Regular rate and rhythm, normal S1 and S2, no murmur/rub/gallop; No lower extremity edema; Peripheral pulses are 2+ bilaterally  ABDOMEN: Nontender to palpation, normoactive bowel sounds, no rebound/guarding; No hepatosplenomegaly  SKIN:  erythema in right chest port site resolved, no drainage, no fluctuance, no ulcerations  MUSCLOSKELETAL: no clubbing or cyanosis of digits; no joint swelling or tenderness to palpation  PSYCH: A+O to person, place, and time; affect appropriate  LABS:                        8.6    4.67  )-----------( 374      ( 27 Dec 2020 05:59 )             28.0     12-27    142  |  107  |  9   ----------------------------<  104<H>  3.7   |  26  |  0.62    Ca    9.5      27 Dec 2020 05:59  Phos  4.4     12-27  Mg     1.7     12-27                  RADIOLOGY & ADDITIONAL TESTS:  Results Reviewed:   Imaging Personally Reviewed:  Electrocardiogram Personally Reviewed:    COORDINATION OF CARE:  Care Discussed with Consultants/Other Providers [Y/N]:  Prior or Outpatient Records Reviewed [Y/N]:

## 2020-12-28 NOTE — PROGRESS NOTE ADULT - PROVIDER SPECIALTY LIST ADULT
Infectious Disease
Heme/Onc
Hospitalist
Infectious Disease
Infectious Disease
Hospitalist

## 2020-12-28 NOTE — PROGRESS NOTE ADULT - PROBLEM SELECTOR PROBLEM 5
Dyslipidemia
Dyslipidemia
Need for prophylactic measure
Dyslipidemia
Dyslipidemia
Need for prophylactic measure
Dyslipidemia
Dyslipidemia

## 2020-12-28 NOTE — DIETITIAN INITIAL EVALUATION ADULT. - ORAL INTAKE PTA/DIET HISTORY
Patient reports she always endorsed good appetite and PO intake. No changes in appetite/po intake reported. Denies any weight changes.

## 2020-12-28 NOTE — DIETITIAN INITIAL EVALUATION ADULT. - OTHER INFO
Met with patient at bedside. Reports appetite and PO intake >75% during the course of admission. Patient denies any nausea/vomiting/diarrhea/constipation or difficulty chewing and swallowing. NKFA. Have been filling out menus. General Healthful Nutrition education provided. Reviewed High Biological Value Protein sources (i.e. chicken, turkey, beef, fish, eggs, etc.). Importance of having well -balanced meals, nutrient and protein dense foods discussed. Patient also have been seen by [facility] at [facility].

## 2020-12-28 NOTE — PROGRESS NOTE ADULT - PROBLEM SELECTOR PROBLEM 3
Malignant neoplasm of left female breast, unspecified estrogen receptor status, unspecified site of breast
Sepsis, due to unspecified organism, unspecified whether acute organ dysfunction present
Malignant neoplasm of left female breast, unspecified estrogen receptor status, unspecified site of breast
Sepsis, due to unspecified organism, unspecified whether acute organ dysfunction present

## 2020-12-28 NOTE — DISCHARGE NOTE NURSING/CASE MANAGEMENT/SOCIAL WORK - PATIENT PORTAL LINK FT
You can access the FollowMyHealth Patient Portal offered by [facility] by registering at the following website: [email or web address]. By joining [facility] [employer]’s FollowMyHealth portal, you will also be able to view your health information using other applications (apps) compatible with our system.

## 2020-12-28 NOTE — DIETITIAN INITIAL EVALUATION ADULT. - CONTINUE CURRENT NUTRITION CARE PLAN
1. Continue current diet order, which remains appropriate at this time. 2. Monitor weights, labs, BM's, skin integrity, p.o. intake. 3. Please Encourage po intake, assist with meals and menu selections, provide alternatives PRN. 4. Honor food and beverage preferences within diet restriction of patient in an effort to maximize level of nutrient intake./yes

## 2020-12-28 NOTE — DIETITIAN INITIAL EVALUATION ADULT. - CHIEF COMPLAINT
61 y/o Female with medical history of breast cancer on chemotherapy, HLD presenting with erythema, pain, fever to right chest port site x 1 week. Suspected cellulitis/port infection per chart review.

## 2020-12-28 NOTE — CHART NOTE - NSCHARTNOTEFT_GEN_A_CORE
Patient Age: 60    Patient Gender: F     Procedure (including site/side if known): PICC     Diagnosis/Indication: MSSA/sepsis     Interventional Radiology Attending Physician: N/a    Ordering Attending Physician: Sisi     Pertinent medical history: breast ca, sepsis/port infection    Pertinent Labs:                       8.9    6.76  )-----------( 448      ( 28 Dec 2020 06:47 )             27.7   12-28    140  |  105  |  10  ----------------------------<  101<H>  4.0   |  25  |  0.69    Ca    9.6      28 Dec 2020 06:47  Phos  4.2     12-  Mg     1.7               Patient and Family aware? Yes     Blanca Aragon PA-C     Contact:  P                             Date:       2020                             time: 9:30am Patient Age: 60    Patient Gender: F     Procedure (including site/side if known): PICC     Diagnosis/Indication: MSSA/sepsis     Interventional Radiology Attending Physician: N/a    Ordering Attending Physician: Sisi     Pertinent medical history: breast ca, sepsis/port infection    Pertinent Labs:                       8.9    6.76  )-----------( 448      ( 28 Dec 2020 06:47 )             27.7   12-28    140  |  105  |  10  ----------------------------<  101<H>  4.0   |  25  |  0.69    Ca    9.6      28 Dec 2020 06:47  Phos  4.2     12-  Mg     1.7               Patient and Family aware? Yes     Blanca Aragon PA-C     Contact:  P                             Date:       2020                             time: 9:30am    Approved by PA supervisor Marques Cali

## 2020-12-28 NOTE — PROGRESS NOTE ADULT - PROBLEM SELECTOR PROBLEM 4
Dyslipidemia
Malignant neoplasm of left female breast, unspecified estrogen receptor status, unspecified site of breast
Malignant neoplasm of left female breast, unspecified estrogen receptor status, unspecified site of breast
Dyslipidemia
Malignant neoplasm of left female breast, unspecified estrogen receptor status, unspecified site of breast

## 2020-12-28 NOTE — PROGRESS NOTE ADULT - PROBLEM SELECTOR PLAN 6
Lovenox qd  DASH/TLC diet

## 2020-12-28 NOTE — DIETITIAN INITIAL EVALUATION ADULT. - PROBLEM SELECTOR PLAN 2
Fever, tachycardia in ED. Given cefepime, vanc, tylenol, IVF  -likely cellulitis  -continue vanc/cefepime for now, ID consult in AM, f/u BCx x2 and UCx  -CXR clear

## 2020-12-30 ENCOUNTER — APPOINTMENT (OUTPATIENT)
Dept: HEMATOLOGY ONCOLOGY | Facility: CLINIC | Age: 60
End: 2020-12-30

## 2021-01-07 ENCOUNTER — LABORATORY RESULT (OUTPATIENT)
Age: 61
End: 2021-01-07

## 2021-01-07 ENCOUNTER — APPOINTMENT (OUTPATIENT)
Dept: INFUSION THERAPY | Facility: HOSPITAL | Age: 61
End: 2021-01-07

## 2021-01-07 ENCOUNTER — APPOINTMENT (OUTPATIENT)
Dept: HEMATOLOGY ONCOLOGY | Facility: CLINIC | Age: 61
End: 2021-01-07
Payer: COMMERCIAL

## 2021-01-07 VITALS
OXYGEN SATURATION: 98 % | BODY MASS INDEX: 30.83 KG/M2 | DIASTOLIC BLOOD PRESSURE: 75 MMHG | HEART RATE: 80 BPM | WEIGHT: 167.55 LBS | HEIGHT: 61.81 IN | SYSTOLIC BLOOD PRESSURE: 116 MMHG | RESPIRATION RATE: 16 BRPM | TEMPERATURE: 97.2 F

## 2021-01-07 DIAGNOSIS — R82.90 UNSPECIFIED ABNORMAL FINDINGS IN URINE: ICD-10-CM

## 2021-01-07 PROCEDURE — 99215 OFFICE O/P EST HI 40 MIN: CPT

## 2021-01-07 PROCEDURE — 99072 ADDL SUPL MATRL&STAF TM PHE: CPT

## 2021-01-08 DIAGNOSIS — R11.2 NAUSEA WITH VOMITING, UNSPECIFIED: ICD-10-CM

## 2021-01-08 DIAGNOSIS — Z51.11 ENCOUNTER FOR ANTINEOPLASTIC CHEMOTHERAPY: ICD-10-CM

## 2021-01-11 RX ORDER — CEPHALEXIN 500 MG/1
500 CAPSULE ORAL 3 TIMES DAILY
Qty: 21 | Refills: 0 | Status: DISCONTINUED | COMMUNITY
Start: 2020-12-09 | End: 2021-01-11

## 2021-01-11 NOTE — HISTORY OF PRESENT ILLNESS
[de-identified] : Ms.Joanne Damon is a 60 year old female here for an evaluation of breast cancer. Her oncologic history is as follows:\par \par Patient noticed left breast mass for 4 months and presented to plastic surgeon's office. She was sent for breast imaging on 8/19/2020 BIRADS 5 which showed new irregular mass with calcifications and nipple retraction in the left retroareolar location on mammography, corresponding to a mass in the left breast 6:00 location 2 cm from the nipple on the concurrent breast ultrasound. Ultrasound-guided core biopsy is recommended for diagnosis.\par - Abnormal left axillary lymph nodes on mammography and sonography, including lymph nodes demonstrating microcalcifications. Ultrasound-guided core biopsy of a representative abnormal appearing left axillary lymph node with calcifications is recommended.\par - Faint loosely grouped calcifications in the upper central left breast and the upper central right breast which are indeterminate. Sampling stereotactic biopsy of both breasts is recommended.\par - Loosely grouped predominantly coarse dystrophic calcifications associated with areas of central lucency in the posterior medial right breast which are thought to represent fat necrosis. Six-month follow-up right diagnostic mammography is recommended. She underwent New irregular mass with calcifications and nipple retraction in the left retroareolar location on mammography, corresponding to a mass in the left breast 6:00 location 2 cm from the nipple on the concurrent breast ultrasound. Ultrasound-guided core biopsy is recommended for diagnosis.\par  She had a left, 6 o'clock, 2 cm FN, core biopsy on on 8/28/2020 which showed Invasive poorly differentiated ductal carcinoma with areas of micropapillary features\par - [place] score 9/9 (3 + 3 + 3)\par - Invasive tumor measures at least 1.0 cm\par - Ductal carcinoma in situ (focal), cribriform pattern with\par high nuclear grade and central necrosis\par \par  Breast, left, axillary lymph node, core biopsy\par - Invasive poorly differentiated ductal carcinoma with mild lymphocytic infiltrate\par ER+ 80%,[place]+ 50%, HER 2 + breast cancer\par \par She has been on Prempro x 7 years for hot flashes. She is off now x 2 weeks. Hot flashes are ok\par She had breast augmentation with Dr Geiger. Implants exchanged 2019.  Benign biopsy in 2019\par \par 9/30/2020\par 9/25/2020 ECHO LVEF 57%\par PETCT [facility] ONC  09/18/2020 No evidence of distant metastasis.\par EKG 2/2020 faxed by PCP , repeat 9/30/2020 done , Chemo consent obtained \par Right side Medport in place, catheter tip at the distal SVC [de-identified] : Ms. KELSY CRANE  is here for a follow up appt for left breast ca, dx 8/28/2020  started neoadjuvant TCHP with onpro on 9/30/2020.  She tested positive for Covid by PCR on 10/30/2020.  A repeat test at 2 weeks was positive again on 11/13/2020.  She was admitted on 11/15/2020 with port site infection.  She was treated with IV antibiotics and was discharged on Zyvox.  Subsequently Covid PCR was done x2 and was negative.  She received cycle 3 on 12/2/2020. \par S/p admission 12/20/2020-12/28/2020 for port infection, port removed. PICC in place left arm. Here for HP #4 today. TC held patient because patient is on IV antibiotics Cefazolin. reports foul smelling urine no dysuria, frequency or blood in urine.  Patient due for ECHO  No cardio pulm sx.  \par She also reports mild hearing loss in the right ear.  Concerned related to carboplatin vs wax. Carbo causes ototoxicity in 1-5% cases.  Recommend urgent ENT evaluation.  Coordinators notified to schedule urgent appointment.  If related to carbo, would consider dose reduction. Hearing has improved . \par postmenopausal\par breast mass shrinking but still sizeable and palpable 3x3cm

## 2021-01-11 NOTE — ASSESSMENT
[Curative] : Goals of care discussed with patient: Curative [FreeTextEntry1] : Ms. KELSY CRANE is a 60 year old female with clinically T2 N1 Mx poorly differentiated invasive ductal carcinoma ER/MN positive and HER-2/lazara POSITIVE. PET/CT 9/2020 ALEXX. Neoadj TCHP started 9/30/2020. \par \par -Breast ca: Ms. KELSY CRANE is s/p TCHP C3.  She received cycle 3 on 12/2/2020. \par S/p admission 12/20/2020-12/28/2020 for port infection, port removed. PICC in place left arm. Here for HP #4 today. TC held patient because patient is on IV antibiotics Cefazolin. She will get full chemo TCHP # 5 in 3 weeks. Breast mass stable\par -  She also reports mild hearing loss in the right ear. which has improved since being off chemo.  Concerned related to carboplatin vs wax. Carbo causes ototoxicity in 1-5% cases.  Recommend urgent ENT evaluation. patient did not f/u with ENT will monitor.  Coordinators notified to schedule urgent appointment.  If related to carbo, would consider dose reduction.\par - At risk for chemo induced cardiomyopathy: Check ECHO Q3m.LAst echo: 9/25/2020 LVEF 57% ECHO ordered today \par - Chemo induced anemia- Grade 1. No transfusion needed. Monitor counts\par - Mild leukocytosis secondary to ONPRO. No neutropenia or thrombocytopenia. \par - Mild mucositis/ oral thrush resolved Nystatin swish and spit 5cc q 8rs for 4 days prn. Continue miracle mouth wash or Carafate.  \par - Chemo induced diarrhea- Continue Imodium prn. IV fluids if sx worsen\par - Chemo induced N/V- Will get premedications with Emend, dexamethasone and Aloxi. she will continue dexamethasone for next 3 days for antinausea prophylaxis. She will use Reglan, Compazine as needed. IV fluid today.\par - GF induced bone pain- take Claritin. \par - Chemo induced dysgeusia and fatigue: Encourage p.o. fluids, small frequent meals. 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 \par - Chemo induced dermatitis: She has acneiform rash on her chest and face. Improved\par - Instructed to call office and go directly to the emergency room with fever more than 100.4, shaking chills, productive cough, sore throat, shortness of breath or urinary symptoms. Patient verbalized understanding and agreement.\par - Emotional support provided, all questions answered.\par - She will undergo surgery after 6 cycles. +/- RT. Candidate for endocrine therapy.\par - We also discussed genetic testing.  She will be referred for genetic testing at a later time.\par The patient had plenty of time to ask questions and all of his questions were answered to satisfaction. I gave my office phone number and encouraged to call with any questions or additional information. \par  \par ECHO before next tx\par Next  tx1/27/2020 TCHP\par RTO D8 each cycle  for MD IV fluids  q 3wks for tx\par

## 2021-01-11 NOTE — PHYSICAL EXAM
[Fully active, able to carry on all pre-disease performance without restriction] : Status 0 - Fully active, able to carry on all pre-disease performance without restriction [Normal] : affect appropriate [de-identified] : Port removed, picc line cdi [de-identified] : BL breasts with mastopexy scars. Left breast with 3x3 cm hard retroareolar mass. Left nipple flattened/inverted. + Left axillary LN, No SCLn

## 2021-01-11 NOTE — REASON FOR VISIT
[Follow-Up Visit] : a follow-up [Other: _____] : [unfilled] [FreeTextEntry2] : ER+ 80%,[place]+ 50%, HER 2 + breast cancer

## 2021-01-12 ENCOUNTER — NON-APPOINTMENT (OUTPATIENT)
Age: 61
End: 2021-01-12

## 2021-01-20 LAB
CULTURE RESULTS: SIGNIFICANT CHANGE UP
SPECIMEN SOURCE: SIGNIFICANT CHANGE UP

## 2021-01-22 ENCOUNTER — OUTPATIENT (OUTPATIENT)
Dept: OUTPATIENT SERVICES | Facility: HOSPITAL | Age: 61
LOS: 1 days | Discharge: HOME | End: 2021-01-22

## 2021-01-22 DIAGNOSIS — Z98.890 OTHER SPECIFIED POSTPROCEDURAL STATES: Chronic | ICD-10-CM

## 2021-01-22 DIAGNOSIS — C50.919 MALIGNANT NEOPLASM OF UNSPECIFIED SITE OF UNSPECIFIED FEMALE BREAST: ICD-10-CM

## 2021-01-22 DIAGNOSIS — Z86.39 PERSONAL HISTORY OF OTHER ENDOCRINE, NUTRITIONAL AND METABOLIC DISEASE: Chronic | ICD-10-CM

## 2021-01-25 ENCOUNTER — LABORATORY RESULT (OUTPATIENT)
Age: 61
End: 2021-01-25

## 2021-01-25 ENCOUNTER — APPOINTMENT (OUTPATIENT)
Dept: INFUSION THERAPY | Facility: HOSPITAL | Age: 61
End: 2021-01-25

## 2021-01-27 ENCOUNTER — RESULT REVIEW (OUTPATIENT)
Age: 61
End: 2021-01-27

## 2021-01-27 ENCOUNTER — LABORATORY RESULT (OUTPATIENT)
Age: 61
End: 2021-01-27

## 2021-01-27 ENCOUNTER — APPOINTMENT (OUTPATIENT)
Dept: INFUSION THERAPY | Facility: HOSPITAL | Age: 61
End: 2021-01-27

## 2021-01-27 LAB
BASOPHILS # BLD AUTO: 0.01 K/UL — SIGNIFICANT CHANGE UP (ref 0–0.2)
BASOPHILS NFR BLD AUTO: 0.1 % — SIGNIFICANT CHANGE UP (ref 0–2)
EOSINOPHIL # BLD AUTO: 0 K/UL — SIGNIFICANT CHANGE UP (ref 0–0.5)
EOSINOPHIL NFR BLD AUTO: 0 % — SIGNIFICANT CHANGE UP (ref 0–6)
HCT VFR BLD CALC: 32.7 % — LOW (ref 34.5–45)
HGB BLD-MCNC: 10.9 G/DL — LOW (ref 11.5–15.5)
IMM GRANULOCYTES NFR BLD AUTO: 0.5 % — SIGNIFICANT CHANGE UP (ref 0–1.5)
LYMPHOCYTES # BLD AUTO: 1.32 K/UL — SIGNIFICANT CHANGE UP (ref 1–3.3)
LYMPHOCYTES # BLD AUTO: 16 % — SIGNIFICANT CHANGE UP (ref 13–44)
MCHC RBC-ENTMCNC: 31.9 PG — SIGNIFICANT CHANGE UP (ref 27–34)
MCHC RBC-ENTMCNC: 33.3 G/DL — SIGNIFICANT CHANGE UP (ref 32–36)
MCV RBC AUTO: 95.6 FL — SIGNIFICANT CHANGE UP (ref 80–100)
MONOCYTES # BLD AUTO: 0.44 K/UL — SIGNIFICANT CHANGE UP (ref 0–0.9)
MONOCYTES NFR BLD AUTO: 5.3 % — SIGNIFICANT CHANGE UP (ref 2–14)
NEUTROPHILS # BLD AUTO: 6.44 K/UL — SIGNIFICANT CHANGE UP (ref 1.8–7.4)
NEUTROPHILS NFR BLD AUTO: 78.1 % — HIGH (ref 43–77)
NRBC # BLD: 0 /100 WBCS — SIGNIFICANT CHANGE UP (ref 0–0)
PLATELET # BLD AUTO: 243 K/UL — SIGNIFICANT CHANGE UP (ref 150–400)
RBC # BLD: 3.42 M/UL — LOW (ref 3.8–5.2)
RBC # FLD: 14.4 % — SIGNIFICANT CHANGE UP (ref 10.3–14.5)
WBC # BLD: 8.25 K/UL — SIGNIFICANT CHANGE UP (ref 3.8–10.5)
WBC # FLD AUTO: 8.25 K/UL — SIGNIFICANT CHANGE UP (ref 3.8–10.5)

## 2021-01-28 DIAGNOSIS — R11.2 NAUSEA WITH VOMITING, UNSPECIFIED: ICD-10-CM

## 2021-01-28 DIAGNOSIS — Z51.89 ENCOUNTER FOR OTHER SPECIFIED AFTERCARE: ICD-10-CM

## 2021-01-28 DIAGNOSIS — Z51.11 ENCOUNTER FOR ANTINEOPLASTIC CHEMOTHERAPY: ICD-10-CM

## 2021-02-03 ENCOUNTER — RESULT REVIEW (OUTPATIENT)
Age: 61
End: 2021-02-03

## 2021-02-03 ENCOUNTER — LABORATORY RESULT (OUTPATIENT)
Age: 61
End: 2021-02-03

## 2021-02-03 ENCOUNTER — APPOINTMENT (OUTPATIENT)
Dept: INFUSION THERAPY | Facility: HOSPITAL | Age: 61
End: 2021-02-03

## 2021-02-03 ENCOUNTER — APPOINTMENT (OUTPATIENT)
Dept: HEMATOLOGY ONCOLOGY | Facility: CLINIC | Age: 61
End: 2021-02-03
Payer: COMMERCIAL

## 2021-02-03 VITALS
OXYGEN SATURATION: 96 % | TEMPERATURE: 98.3 F | SYSTOLIC BLOOD PRESSURE: 113 MMHG | DIASTOLIC BLOOD PRESSURE: 76 MMHG | RESPIRATION RATE: 16 BRPM | BODY MASS INDEX: 30.77 KG/M2 | WEIGHT: 162.99 LBS | HEART RATE: 100 BPM | HEIGHT: 61 IN

## 2021-02-03 DIAGNOSIS — K52.1 TOXIC GASTROENTERITIS AND COLITIS: ICD-10-CM

## 2021-02-03 DIAGNOSIS — R43.2 PARAGEUSIA: ICD-10-CM

## 2021-02-03 DIAGNOSIS — T45.1X5A TOXIC GASTROENTERITIS AND COLITIS: ICD-10-CM

## 2021-02-03 LAB
BASOPHILS # BLD AUTO: 0.1 K/UL — SIGNIFICANT CHANGE UP (ref 0–0.2)
BASOPHILS NFR BLD AUTO: 1 % — SIGNIFICANT CHANGE UP (ref 0–2)
EOSINOPHIL # BLD AUTO: 0 K/UL — SIGNIFICANT CHANGE UP (ref 0–0.5)
EOSINOPHIL NFR BLD AUTO: 0 % — SIGNIFICANT CHANGE UP (ref 0–6)
HCT VFR BLD CALC: 33.4 % — LOW (ref 34.5–45)
HGB BLD-MCNC: 11 G/DL — LOW (ref 11.5–15.5)
LYMPHOCYTES # BLD AUTO: 1.91 K/UL — SIGNIFICANT CHANGE UP (ref 1–3.3)
LYMPHOCYTES # BLD AUTO: 20 % — SIGNIFICANT CHANGE UP (ref 13–44)
MCHC RBC-ENTMCNC: 32.2 PG — SIGNIFICANT CHANGE UP (ref 27–34)
MCHC RBC-ENTMCNC: 32.9 G/DL — SIGNIFICANT CHANGE UP (ref 32–36)
MCV RBC AUTO: 97.7 FL — SIGNIFICANT CHANGE UP (ref 80–100)
METAMYELOCYTES # FLD: 1 % — HIGH (ref 0–0)
MONOCYTES # BLD AUTO: 1.72 K/UL — HIGH (ref 0–0.9)
MONOCYTES NFR BLD AUTO: 18 % — HIGH (ref 2–14)
MYELOCYTES NFR BLD: 1 % — HIGH (ref 0–0)
NEUTROPHILS # BLD AUTO: 5.63 K/UL — SIGNIFICANT CHANGE UP (ref 1.8–7.4)
NEUTROPHILS NFR BLD AUTO: 58 % — SIGNIFICANT CHANGE UP (ref 43–77)
NEUTS BAND # BLD: 1 % — SIGNIFICANT CHANGE UP (ref 0–8)
NRBC # BLD: 0 /100 — SIGNIFICANT CHANGE UP (ref 0–0)
NRBC # BLD: SIGNIFICANT CHANGE UP /100 WBCS (ref 0–0)
PLAT MORPH BLD: NORMAL — SIGNIFICANT CHANGE UP
PLATELET # BLD AUTO: 149 K/UL — LOW (ref 150–400)
RBC # BLD: 3.42 M/UL — LOW (ref 3.8–5.2)
RBC # FLD: 14.3 % — SIGNIFICANT CHANGE UP (ref 10.3–14.5)
RBC BLD AUTO: SIGNIFICANT CHANGE UP
WBC # BLD: 9.55 K/UL — SIGNIFICANT CHANGE UP (ref 3.8–10.5)
WBC # FLD AUTO: 9.55 K/UL — SIGNIFICANT CHANGE UP (ref 3.8–10.5)

## 2021-02-03 PROCEDURE — 99215 OFFICE O/P EST HI 40 MIN: CPT

## 2021-02-03 PROCEDURE — 99072 ADDL SUPL MATRL&STAF TM PHE: CPT

## 2021-02-04 ENCOUNTER — NON-APPOINTMENT (OUTPATIENT)
Age: 61
End: 2021-02-04

## 2021-02-04 ENCOUNTER — APPOINTMENT (OUTPATIENT)
Dept: INFUSION THERAPY | Facility: HOSPITAL | Age: 61
End: 2021-02-04

## 2021-02-07 PROBLEM — K52.1 CHEMOTHERAPY-INDUCED DIARRHEA: Status: ACTIVE | Noted: 2020-10-08

## 2021-02-07 PROBLEM — R43.2 DYSGEUSIA: Status: ACTIVE | Noted: 2020-10-08

## 2021-02-07 NOTE — ASSESSMENT
[Curative] : Goals of care discussed with patient: Curative [FreeTextEntry1] : Ms. KELSY CRANE is a 60 year old female with clinically T2 N1 Mx poorly differentiated invasive ductal carcinoma ER/GA positive and HER-2/lazara POSITIVE. PET/CT 9/2020 ALEXX. Neoadj TCHP started 9/30/2020. \par \par -Breast ca: Ms. KELSY CRANE  started neoadjuvant TCHP with onpro on 9/30/2020.  She tested positive for Covid by PCR on 10/30/2020.  A repeat test at 2 weeks was positive again on 11/13/2020.  She was admitted on 11/15/2020 with port site infection.  She was treated with IV antibiotics and was discharged on Zyvox.  Subsequently Covid PCR was done x2 and was negative.  She received cycle 3 on 12/2/2020. S/p admission 12/20/2020-12/28/2020 for port infection, port removed. s/p HP #4 on 1/7 (TC held) and TCHP 4, HP 5 on 1/27. Here for day 8 toxicity check. \par - Tolerating chemo with expected toxicity. IV fluids today and labs. Breast mass improving. \par - At risk for chemo induced cardiomyopathy: Check ECHO Q3m.LAst echo: 12/2020\par - Chemo induced anemia- Grade 1. No transfusion needed. Monitor counts\par - Mild leukocytosis secondary to ONPRO. No neutropenia or thrombocytopenia. \par - Mild mucositis/ oral thrush resolved Nystatin swish and spit 5cc q 8rs for 4 days prn. Continue miracle mouth wash or Carafate.  \par - Chemo induced diarrhea- Continue Imodium prn. IV fluids if sx worsen\par - Chemo induced N/V- Will get premedications with Emend, dexamethasone and Aloxi. she will continue dexamethasone for next 3 days for antinausea prophylaxis. She will use Reglan, Compazine as needed. IV fluid today.\par - GF induced bone pain- take Claritin. \par - Chemo induced dysgeusia and fatigue: Encourage p.o. fluids, small frequent meals. 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 \par - Chemo induced dermatitis: She has acneiform rash on her chest and face. Improved\par - Instructed to call office and go directly to the emergency room with fever more than 100.4, shaking chills, productive cough, sore throat, shortness of breath or urinary symptoms. Patient verbalized understanding and agreement.\par - Emotional support provided, all questions answered.\par - She will undergo surgery after 6 cycles. +/- RT. Candidate for endocrine therapy.\par - We also discussed genetic testing.  She will be referred for genetic testing at a later time.\par The patient had plenty of time to ask questions and all of his questions were answered to satisfaction. I gave my office phone number and encouraged to call with any questions or additional information. \par  \par Next  tx in 2 weeks\par RTO D8 each cycle  for MD IV fluids  q 3wks for tx\par

## 2021-02-07 NOTE — PHYSICAL EXAM
[Fully active, able to carry on all pre-disease performance without restriction] : Status 0 - Fully active, able to carry on all pre-disease performance without restriction [Normal] : affect appropriate [de-identified] : Port removed, picc line cdi [de-identified] : BL breasts with mastopexy scars. Left breast with 3x3 cm hard retroareolar mass. Left nipple flattened/inverted. + Left axillary LN, No SCLn

## 2021-02-07 NOTE — REASON FOR VISIT
[Follow-Up Visit] : a follow-up [Other: _____] : [unfilled] [FreeTextEntry2] : ER+ 80%,[place]+ 50%, HER 2 + breast cancer

## 2021-02-10 LAB
CULTURE RESULTS: SIGNIFICANT CHANGE UP
SPECIMEN SOURCE: SIGNIFICANT CHANGE UP

## 2021-02-17 ENCOUNTER — APPOINTMENT (OUTPATIENT)
Dept: INFUSION THERAPY | Facility: HOSPITAL | Age: 61
End: 2021-02-17

## 2021-02-17 ENCOUNTER — RESULT REVIEW (OUTPATIENT)
Age: 61
End: 2021-02-17

## 2021-02-17 LAB
BASOPHILS # BLD AUTO: 0.01 K/UL — SIGNIFICANT CHANGE UP (ref 0–0.2)
BASOPHILS NFR BLD AUTO: 0.1 % — SIGNIFICANT CHANGE UP (ref 0–2)
EOSINOPHIL # BLD AUTO: 0 K/UL — SIGNIFICANT CHANGE UP (ref 0–0.5)
EOSINOPHIL NFR BLD AUTO: 0 % — SIGNIFICANT CHANGE UP (ref 0–6)
HCT VFR BLD CALC: 31.6 % — LOW (ref 34.5–45)
HGB BLD-MCNC: 10.7 G/DL — LOW (ref 11.5–15.5)
IMM GRANULOCYTES NFR BLD AUTO: 0.6 % — SIGNIFICANT CHANGE UP (ref 0–1.5)
LYMPHOCYTES # BLD AUTO: 1.43 K/UL — SIGNIFICANT CHANGE UP (ref 1–3.3)
LYMPHOCYTES # BLD AUTO: 18.1 % — SIGNIFICANT CHANGE UP (ref 13–44)
MCHC RBC-ENTMCNC: 32.1 PG — SIGNIFICANT CHANGE UP (ref 27–34)
MCHC RBC-ENTMCNC: 33.9 G/DL — SIGNIFICANT CHANGE UP (ref 32–36)
MCV RBC AUTO: 94.9 FL — SIGNIFICANT CHANGE UP (ref 80–100)
MONOCYTES # BLD AUTO: 0.75 K/UL — SIGNIFICANT CHANGE UP (ref 0–0.9)
MONOCYTES NFR BLD AUTO: 9.5 % — SIGNIFICANT CHANGE UP (ref 2–14)
NEUTROPHILS # BLD AUTO: 5.68 K/UL — SIGNIFICANT CHANGE UP (ref 1.8–7.4)
NEUTROPHILS NFR BLD AUTO: 71.7 % — SIGNIFICANT CHANGE UP (ref 43–77)
NRBC # BLD: 0 /100 WBCS — SIGNIFICANT CHANGE UP (ref 0–0)
PLATELET # BLD AUTO: 300 K/UL — SIGNIFICANT CHANGE UP (ref 150–400)
RBC # BLD: 3.33 M/UL — LOW (ref 3.8–5.2)
RBC # FLD: 14.9 % — HIGH (ref 10.3–14.5)
WBC # BLD: 7.92 K/UL — SIGNIFICANT CHANGE UP (ref 3.8–10.5)
WBC # FLD AUTO: 7.92 K/UL — SIGNIFICANT CHANGE UP (ref 3.8–10.5)

## 2021-02-20 ENCOUNTER — OUTPATIENT (OUTPATIENT)
Dept: OUTPATIENT SERVICES | Facility: HOSPITAL | Age: 61
LOS: 1 days | Discharge: HOME | End: 2021-02-20

## 2021-02-20 DIAGNOSIS — Z86.39 PERSONAL HISTORY OF OTHER ENDOCRINE, NUTRITIONAL AND METABOLIC DISEASE: Chronic | ICD-10-CM

## 2021-02-20 DIAGNOSIS — C50.919 MALIGNANT NEOPLASM OF UNSPECIFIED SITE OF UNSPECIFIED FEMALE BREAST: ICD-10-CM

## 2021-02-20 DIAGNOSIS — Z98.890 OTHER SPECIFIED POSTPROCEDURAL STATES: Chronic | ICD-10-CM

## 2021-02-24 ENCOUNTER — LABORATORY RESULT (OUTPATIENT)
Age: 61
End: 2021-02-24

## 2021-02-24 ENCOUNTER — RESULT REVIEW (OUTPATIENT)
Age: 61
End: 2021-02-24

## 2021-02-24 ENCOUNTER — APPOINTMENT (OUTPATIENT)
Dept: INFUSION THERAPY | Facility: HOSPITAL | Age: 61
End: 2021-02-24

## 2021-02-24 ENCOUNTER — APPOINTMENT (OUTPATIENT)
Dept: HEMATOLOGY ONCOLOGY | Facility: CLINIC | Age: 61
End: 2021-02-24

## 2021-02-24 ENCOUNTER — NON-APPOINTMENT (OUTPATIENT)
Age: 61
End: 2021-02-24

## 2021-02-24 ENCOUNTER — APPOINTMENT (OUTPATIENT)
Dept: HEMATOLOGY ONCOLOGY | Facility: CLINIC | Age: 61
End: 2021-02-24
Payer: COMMERCIAL

## 2021-02-24 VITALS
BODY MASS INDEX: 31.27 KG/M2 | WEIGHT: 161.38 LBS | OXYGEN SATURATION: 97 % | RESPIRATION RATE: 16 BRPM | DIASTOLIC BLOOD PRESSURE: 71 MMHG | HEIGHT: 60.43 IN | HEART RATE: 102 BPM | SYSTOLIC BLOOD PRESSURE: 106 MMHG | TEMPERATURE: 98.2 F

## 2021-02-24 DIAGNOSIS — M89.8X9 OTHER SPECIFIED DISORDERS OF BONE, UNSPECIFIED SITE: ICD-10-CM

## 2021-02-24 DIAGNOSIS — U07.1 COVID-19: ICD-10-CM

## 2021-02-24 LAB
BASOPHILS # BLD AUTO: 0 K/UL — SIGNIFICANT CHANGE UP (ref 0–0.2)
BASOPHILS NFR BLD AUTO: 0 % — SIGNIFICANT CHANGE UP (ref 0–2)
EOSINOPHIL # BLD AUTO: 0 K/UL — SIGNIFICANT CHANGE UP (ref 0–0.5)
EOSINOPHIL NFR BLD AUTO: 0 % — SIGNIFICANT CHANGE UP (ref 0–6)
HCT VFR BLD CALC: 33 % — LOW (ref 34.5–45)
HGB BLD-MCNC: 10.9 G/DL — LOW (ref 11.5–15.5)
LYMPHOCYTES # BLD AUTO: 1.94 K/UL — SIGNIFICANT CHANGE UP (ref 1–3.3)
LYMPHOCYTES # BLD AUTO: 17 % — SIGNIFICANT CHANGE UP (ref 13–44)
MCHC RBC-ENTMCNC: 31.8 PG — SIGNIFICANT CHANGE UP (ref 27–34)
MCHC RBC-ENTMCNC: 33 G/DL — SIGNIFICANT CHANGE UP (ref 32–36)
MCV RBC AUTO: 96.2 FL — SIGNIFICANT CHANGE UP (ref 80–100)
MONOCYTES # BLD AUTO: 2.28 K/UL — HIGH (ref 0–0.9)
MONOCYTES NFR BLD AUTO: 20 % — HIGH (ref 2–14)
NEUTROPHILS # BLD AUTO: 7.18 K/UL — SIGNIFICANT CHANGE UP (ref 1.8–7.4)
NEUTROPHILS NFR BLD AUTO: 63 % — SIGNIFICANT CHANGE UP (ref 43–77)
NRBC # BLD: 0 /100 — SIGNIFICANT CHANGE UP (ref 0–0)
NRBC # BLD: SIGNIFICANT CHANGE UP /100 WBCS (ref 0–0)
PLAT MORPH BLD: NORMAL — SIGNIFICANT CHANGE UP
PLATELET # BLD AUTO: 200 K/UL — SIGNIFICANT CHANGE UP (ref 150–400)
RBC # BLD: 3.43 M/UL — LOW (ref 3.8–5.2)
RBC # FLD: 15.1 % — HIGH (ref 10.3–14.5)
RBC BLD AUTO: SIGNIFICANT CHANGE UP
WBC # BLD: 11.39 K/UL — HIGH (ref 3.8–10.5)
WBC # FLD AUTO: 11.39 K/UL — HIGH (ref 3.8–10.5)

## 2021-02-24 PROCEDURE — 99215 OFFICE O/P EST HI 40 MIN: CPT

## 2021-02-24 PROCEDURE — 99072 ADDL SUPL MATRL&STAF TM PHE: CPT

## 2021-02-25 DIAGNOSIS — E86.0 DEHYDRATION: ICD-10-CM

## 2021-02-26 ENCOUNTER — NON-APPOINTMENT (OUTPATIENT)
Age: 61
End: 2021-02-26

## 2021-02-27 PROBLEM — M89.8X9 BONE PAIN DUE TO G-CSF: Status: ACTIVE | Noted: 2020-10-28

## 2021-02-27 PROBLEM — U07.1 COVID-19 VIRUS DETECTED: Status: ACTIVE | Noted: 2021-02-27

## 2021-02-27 NOTE — REASON FOR VISIT
[Follow-Up Visit] : a follow-up [Other: _____] : [unfilled] [FreeTextEntry2] : ER+ 80%,[place]+ 50%, HER 2 + breast cancer

## 2021-02-27 NOTE — PHYSICAL EXAM
[Fully active, able to carry on all pre-disease performance without restriction] : Status 0 - Fully active, able to carry on all pre-disease performance without restriction [Normal] : affect appropriate [de-identified] : Port removed, picc line cdi [de-identified] : BL breasts with mastopexy scars. Left breast with 3x3 cm hard retroareolar mass. Left nipple flattened/inverted. + Left axillary LN, No SCLn

## 2021-02-27 NOTE — HISTORY OF PRESENT ILLNESS
[de-identified] : Ms.Joanne Damon is a 60 year old female here for an evaluation of breast cancer. Her oncologic history is as follows:\par \par Patient noticed left breast mass for 4 months and presented to plastic surgeon's office. She was sent for breast imaging on 8/19/2020 BIRADS 5 which showed new irregular mass with calcifications and nipple retraction in the left retroareolar location on mammography, corresponding to a mass in the left breast 6:00 location 2 cm from the nipple on the concurrent breast ultrasound. Ultrasound-guided core biopsy is recommended for diagnosis.\par - Abnormal left axillary lymph nodes on mammography and sonography, including lymph nodes demonstrating microcalcifications. Ultrasound-guided core biopsy of a representative abnormal appearing left axillary lymph node with calcifications is recommended.\par - Faint loosely grouped calcifications in the upper central left breast and the upper central right breast which are indeterminate. Sampling stereotactic biopsy of both breasts is recommended.\par - Loosely grouped predominantly coarse dystrophic calcifications associated with areas of central lucency in the posterior medial right breast which are thought to represent fat necrosis. Six-month follow-up right diagnostic mammography is recommended. She underwent New irregular mass with calcifications and nipple retraction in the left retroareolar location on mammography, corresponding to a mass in the left breast 6:00 location 2 cm from the nipple on the concurrent breast ultrasound. Ultrasound-guided core biopsy is recommended for diagnosis.\par  She had a left, 6 o'clock, 2 cm FN, core biopsy on on 8/28/2020 which showed Invasive poorly differentiated ductal carcinoma with areas of micropapillary features\par - [place] score 9/9 (3 + 3 + 3)\par - Invasive tumor measures at least 1.0 cm\par - Ductal carcinoma in situ (focal), cribriform pattern with\par high nuclear grade and central necrosis\par \par  Breast, left, axillary lymph node, core biopsy\par - Invasive poorly differentiated ductal carcinoma with mild lymphocytic infiltrate\par ER+ 80%,[place]+ 50%, HER 2 + breast cancer\par \par She has been on Prempro x 7 years for hot flashes. She is off now x 2 weeks. Hot flashes are ok\par She had breast augmentation with Dr Geiger. Implants exchanged 2019.  Benign biopsy in 2019\par \par 9/30/2020\par 9/25/2020 ECHO LVEF 57%\par [facility]CT [facility] ONC  09/18/2020 No evidence of distant metastasis.\par EKG 2/2020 faxed by PCP , repeat 9/30/2020 done , Chemo consent obtained \par Right side Medport in place, catheter tip at the distal SVC [de-identified] : Ms. KELSY CRANE  is here for a follow up appt for left breast ca, dx 8/28/2020  started neoadjuvant TCHP with onpro on 9/30/2020.  She tested positive for Covid by PCR on 10/30/2020.  A repeat test at 2 weeks was positive again on 11/13/2020.  She was admitted on 11/15/2020 with port site infection.  She was treated with IV antibiotics and was discharged on Zyvox.  Subsequently Covid PCR was done x2 and was negative.  She received cycle 3 on 12/2/2020. S/p admission 12/20/2020-12/28/2020 for port infection, port removed. s/p HP #4 on 1/7 (TC held) and TCHP 4, HP 5 on 1/27. TCHP on 2/17/21. Here for day 8 toxicity check. She reports mild-moderate fatigue and altered taste x 3-4 days after chemo. She was able to function, maintained PO intake, weight stable. She had mild nausea, took Reglan, no vomiting, no diarrhea or mouth sores. She had tingling/heavy feeling in her fingertips x 1 day. Resolved now. No Bone pain, no fevers. No hearing issues. No urine c/o \par echo 12/2020  No cardio pulm sx.  \par postmenopausal\par breast mass shrinking but still sizeable and palpable 3x3cm\par Patient is very emotional and, crying today . She states she is on the verge of a breakup with her boyfriend of 14 years. Emotional support given and  called to exam room for additional emotional support..

## 2021-02-27 NOTE — ASSESSMENT
[Curative] : Goals of care discussed with patient: Curative [FreeTextEntry1] : Ms. KELSY CRANE is a 60 year old female with clinically T2 N1 Mx poorly differentiated invasive ductal carcinoma ER/NE positive and HER-2/lazara POSITIVE. PET/CT 9/2020 ALEXX. Neoadj TCHP started 9/30/2020. \par \par -Breast ca: Ms. KELSY CRANE  started neoadjuvant TCHP with onpro on 9/30/2020.  She tested positive for Covid by PCR on 10/30/2020.  A repeat test at 2 weeks was positive again on 11/13/2020.  She was admitted on 11/15/2020 with port site infection.  She was treated with IV antibiotics and was discharged on Zyvox.  Subsequently Covid PCR was done x2 and was negative.  She received cycle 3 on 12/2/2020. S/p admission 12/20/2020-12/28/2020 for port infection, port removed. s/p HP #4 on 1/7 (TC held) and TCHP 4, HP 4 on 1/27. TCHP #5 2/17/21 Here for day 8 toxicity check as patient is on cytotoxic chemotherapy and needs intensive monitoring for severe toxicity. \par -  2/24/21 COVID test positive patient asymptomatic COVID -19 IgG antibody ordered\par - Tolerating chemo with expected toxicity. IV fluids today and labs. Breast mass improving. \par - At risk for chemo induced cardiomyopathy: Check ECHO Q3m.LAst echo: 12/2020, due 3/21\par - Chemo induced anemia- Grade 1. No transfusion needed. Monitor counts  Patient is on cytotoxic chemotherapy and needs intensive monitoring for severe toxicity.\par - Mild leukocytosis secondary to ONPRO. No neutropenia or thrombocytopenia. CBC reviewed with the patient today to make sure she is not neutropenic from high risk cancer therapy drug.  We will continue to monitor CBC every 2 to 4 weeks for intense drug monitoring.\par - Mild mucositis/ oral thrush resolved Nystatin swish and spit 5cc q 8rs for 4 days prn. Continue miracle mouth wash or Carafate.  \par - Chemo induced diarrhea- Continue Imodium prn. IV fluids if sx worsen\par - Chemo induced N/V- Will get premedications with Emend, dexamethasone and Aloxi. she will continue dexamethasone for next 3 days for antinausea prophylaxis. She will use Reglan, Compazine as needed. IV fluid today.\par -Hypomagnesemia- 2/24/21 continue slo Mag TID. will monitor\par - GF induced bone pain- take Claritin. \par - Chemo induced dysgeusia and fatigue: Encourage p.o. fluids, small frequent meals. 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 \par - Chemo induced dermatitis: She has acneiform rash on her chest and face. Improved\par - Instructed to call office and go directly to the emergency room with fever more than 100.4, shaking chills, productive cough, sore throat, shortness of breath or urinary symptoms. Patient verbalized understanding and agreement.\par - Emotional support provided, all questions answered.\par - She will undergo surgery after 6 cycles. +/- RT. Candidate for endocrine therapy.\par - We also discussed genetic testing.  She will be referred for genetic testing at a later time.\par The patient had plenty of time to ask questions and all of his questions were answered to satisfaction. I gave my office phone number and encouraged to call with any questions or additional information. \par  \par Next  tx in 3 weeks\par RTO D8 each cycle  for MD IV fluids  q 3wks for tx\par D/w and seen in collaboration with Dr. Cassie Bell\par \par

## 2021-03-03 ENCOUNTER — LABORATORY RESULT (OUTPATIENT)
Age: 61
End: 2021-03-03

## 2021-03-03 ENCOUNTER — APPOINTMENT (OUTPATIENT)
Dept: HEMATOLOGY ONCOLOGY | Facility: CLINIC | Age: 61
End: 2021-03-03

## 2021-03-09 ENCOUNTER — APPOINTMENT (OUTPATIENT)
Dept: CARDIOLOGY | Facility: CLINIC | Age: 61
End: 2021-03-09
Payer: COMMERCIAL

## 2021-03-09 PROCEDURE — 93356 MYOCRD STRAIN IMG SPCKL TRCK: CPT

## 2021-03-09 PROCEDURE — 99072 ADDL SUPL MATRL&STAF TM PHE: CPT

## 2021-03-09 PROCEDURE — 93306 TTE W/DOPPLER COMPLETE: CPT

## 2021-03-10 ENCOUNTER — RESULT REVIEW (OUTPATIENT)
Age: 61
End: 2021-03-10

## 2021-03-10 ENCOUNTER — APPOINTMENT (OUTPATIENT)
Dept: INFUSION THERAPY | Facility: HOSPITAL | Age: 61
End: 2021-03-10

## 2021-03-10 LAB
BASOPHILS # BLD AUTO: 0.02 K/UL — SIGNIFICANT CHANGE UP (ref 0–0.2)
BASOPHILS NFR BLD AUTO: 0.3 % — SIGNIFICANT CHANGE UP (ref 0–2)
EOSINOPHIL # BLD AUTO: 0 K/UL — SIGNIFICANT CHANGE UP (ref 0–0.5)
EOSINOPHIL NFR BLD AUTO: 0 % — SIGNIFICANT CHANGE UP (ref 0–6)
HCT VFR BLD CALC: 30.5 % — LOW (ref 34.5–45)
HGB BLD-MCNC: 10.3 G/DL — LOW (ref 11.5–15.5)
IMM GRANULOCYTES NFR BLD AUTO: 1.1 % — SIGNIFICANT CHANGE UP (ref 0–1.5)
LYMPHOCYTES # BLD AUTO: 0.85 K/UL — LOW (ref 1–3.3)
LYMPHOCYTES # BLD AUTO: 13.8 % — SIGNIFICANT CHANGE UP (ref 13–44)
MCHC RBC-ENTMCNC: 31.9 PG — SIGNIFICANT CHANGE UP (ref 27–34)
MCHC RBC-ENTMCNC: 33.8 G/DL — SIGNIFICANT CHANGE UP (ref 32–36)
MCV RBC AUTO: 94.4 FL — SIGNIFICANT CHANGE UP (ref 80–100)
MONOCYTES # BLD AUTO: 0.24 K/UL — SIGNIFICANT CHANGE UP (ref 0–0.9)
MONOCYTES NFR BLD AUTO: 3.9 % — SIGNIFICANT CHANGE UP (ref 2–14)
NEUTROPHILS # BLD AUTO: 5 K/UL — SIGNIFICANT CHANGE UP (ref 1.8–7.4)
NEUTROPHILS NFR BLD AUTO: 80.9 % — HIGH (ref 43–77)
NRBC # BLD: 0 /100 WBCS — SIGNIFICANT CHANGE UP (ref 0–0)
PLATELET # BLD AUTO: 153 K/UL — SIGNIFICANT CHANGE UP (ref 150–400)
RBC # BLD: 3.23 M/UL — LOW (ref 3.8–5.2)
RBC # FLD: 15.9 % — HIGH (ref 10.3–14.5)
WBC # BLD: 6.18 K/UL — SIGNIFICANT CHANGE UP (ref 3.8–10.5)
WBC # FLD AUTO: 6.18 K/UL — SIGNIFICANT CHANGE UP (ref 3.8–10.5)

## 2021-03-11 DIAGNOSIS — Z51.11 ENCOUNTER FOR ANTINEOPLASTIC CHEMOTHERAPY: ICD-10-CM

## 2021-03-11 DIAGNOSIS — R11.2 NAUSEA WITH VOMITING, UNSPECIFIED: ICD-10-CM

## 2021-03-17 ENCOUNTER — RESULT REVIEW (OUTPATIENT)
Age: 61
End: 2021-03-17

## 2021-03-17 ENCOUNTER — APPOINTMENT (OUTPATIENT)
Dept: INFUSION THERAPY | Facility: HOSPITAL | Age: 61
End: 2021-03-17

## 2021-03-17 ENCOUNTER — LABORATORY RESULT (OUTPATIENT)
Age: 61
End: 2021-03-17

## 2021-03-17 ENCOUNTER — APPOINTMENT (OUTPATIENT)
Dept: HEMATOLOGY ONCOLOGY | Facility: CLINIC | Age: 61
End: 2021-03-17
Payer: COMMERCIAL

## 2021-03-17 VITALS
RESPIRATION RATE: 17 BRPM | SYSTOLIC BLOOD PRESSURE: 111 MMHG | TEMPERATURE: 98.2 F | HEIGHT: 60.43 IN | DIASTOLIC BLOOD PRESSURE: 80 MMHG | OXYGEN SATURATION: 98 % | WEIGHT: 160.47 LBS | BODY MASS INDEX: 31.09 KG/M2 | HEART RATE: 110 BPM

## 2021-03-17 LAB
BASOPHILS # BLD AUTO: 0 K/UL — SIGNIFICANT CHANGE UP (ref 0–0.2)
BASOPHILS NFR BLD AUTO: 0 % — SIGNIFICANT CHANGE UP (ref 0–2)
EOSINOPHIL # BLD AUTO: 0 K/UL — SIGNIFICANT CHANGE UP (ref 0–0.5)
EOSINOPHIL NFR BLD AUTO: 0 % — SIGNIFICANT CHANGE UP (ref 0–6)
HCT VFR BLD CALC: 30.4 % — LOW (ref 34.5–45)
HGB BLD-MCNC: 10 G/DL — LOW (ref 11.5–15.5)
LYMPHOCYTES # BLD AUTO: 2.6 K/UL — SIGNIFICANT CHANGE UP (ref 1–3.3)
LYMPHOCYTES # BLD AUTO: 32 % — SIGNIFICANT CHANGE UP (ref 13–44)
MCHC RBC-ENTMCNC: 31.9 PG — SIGNIFICANT CHANGE UP (ref 27–34)
MCHC RBC-ENTMCNC: 32.9 G/DL — SIGNIFICANT CHANGE UP (ref 32–36)
MCV RBC AUTO: 97.1 FL — SIGNIFICANT CHANGE UP (ref 80–100)
MONOCYTES # BLD AUTO: 1.3 K/UL — HIGH (ref 0–0.9)
MONOCYTES NFR BLD AUTO: 16 % — HIGH (ref 2–14)
NEUTROPHILS # BLD AUTO: 4.23 K/UL — SIGNIFICANT CHANGE UP (ref 1.8–7.4)
NEUTROPHILS NFR BLD AUTO: 52 % — SIGNIFICANT CHANGE UP (ref 43–77)
NRBC # BLD: 0 /100 — SIGNIFICANT CHANGE UP (ref 0–0)
NRBC # BLD: SIGNIFICANT CHANGE UP /100 WBCS (ref 0–0)
PLAT MORPH BLD: NORMAL — SIGNIFICANT CHANGE UP
PLATELET # BLD AUTO: 130 K/UL — LOW (ref 150–400)
RBC # BLD: 3.13 M/UL — LOW (ref 3.8–5.2)
RBC # FLD: 16 % — HIGH (ref 10.3–14.5)
RBC BLD AUTO: SIGNIFICANT CHANGE UP
WBC # BLD: 8.13 K/UL — SIGNIFICANT CHANGE UP (ref 3.8–10.5)
WBC # FLD AUTO: 8.13 K/UL — SIGNIFICANT CHANGE UP (ref 3.8–10.5)

## 2021-03-17 PROCEDURE — 99072 ADDL SUPL MATRL&STAF TM PHE: CPT

## 2021-03-17 PROCEDURE — 99215 OFFICE O/P EST HI 40 MIN: CPT

## 2021-03-18 ENCOUNTER — NON-APPOINTMENT (OUTPATIENT)
Age: 61
End: 2021-03-18

## 2021-03-25 ENCOUNTER — OUTPATIENT (OUTPATIENT)
Dept: OUTPATIENT SERVICES | Facility: HOSPITAL | Age: 61
LOS: 1 days | Discharge: HOME | End: 2021-03-25

## 2021-03-25 DIAGNOSIS — Z86.39 PERSONAL HISTORY OF OTHER ENDOCRINE, NUTRITIONAL AND METABOLIC DISEASE: Chronic | ICD-10-CM

## 2021-03-25 DIAGNOSIS — Z98.890 OTHER SPECIFIED POSTPROCEDURAL STATES: Chronic | ICD-10-CM

## 2021-03-25 DIAGNOSIS — C50.919 MALIGNANT NEOPLASM OF UNSPECIFIED SITE OF UNSPECIFIED FEMALE BREAST: ICD-10-CM

## 2021-03-31 ENCOUNTER — APPOINTMENT (OUTPATIENT)
Dept: INFUSION THERAPY | Facility: HOSPITAL | Age: 61
End: 2021-03-31

## 2021-03-31 RX ORDER — APREPITANT 80 MG/1
80 CAPSULE ORAL DAILY
Qty: 2 | Refills: 3 | Status: DISCONTINUED | COMMUNITY
Start: 2020-09-29 | End: 2021-03-31

## 2021-03-31 RX ORDER — DEXAMETHASONE 4 MG/1
4 TABLET ORAL TWICE DAILY
Qty: 40 | Refills: 0 | Status: DISCONTINUED | COMMUNITY
Start: 2020-09-25 | End: 2021-03-31

## 2021-03-31 NOTE — REASON FOR VISIT
[Follow-Up Visit] : a follow-up [Other: _____] : [unfilled] [FreeTextEntry2] : ER+ 80%,[place]+ 50%, HER 2 + breast cancer

## 2021-03-31 NOTE — HISTORY OF PRESENT ILLNESS
[de-identified] : Ms.Joanne Damon is a 60 year old female here for an evaluation of breast cancer. Her oncologic history is as follows:\par \par Patient noticed left breast mass for 4 months and presented to plastic surgeon's office. She was sent for breast imaging on 8/19/2020 BIRADS 5 which showed new irregular mass with calcifications and nipple retraction in the left retroareolar location on mammography, corresponding to a mass in the left breast 6:00 location 2 cm from the nipple on the concurrent breast ultrasound. Ultrasound-guided core biopsy is recommended for diagnosis.\par - Abnormal left axillary lymph nodes on mammography and sonography, including lymph nodes demonstrating microcalcifications. Ultrasound-guided core biopsy of a representative abnormal appearing left axillary lymph node with calcifications is recommended.\par - Faint loosely grouped calcifications in the upper central left breast and the upper central right breast which are indeterminate. Sampling stereotactic biopsy of both breasts is recommended.\par - Loosely grouped predominantly coarse dystrophic calcifications associated with areas of central lucency in the posterior medial right breast which are thought to represent fat necrosis. Six-month follow-up right diagnostic mammography is recommended. She underwent New irregular mass with calcifications and nipple retraction in the left retroareolar location on mammography, corresponding to a mass in the left breast 6:00 location 2 cm from the nipple on the concurrent breast ultrasound. Ultrasound-guided core biopsy is recommended for diagnosis.\par  She had a left, 6 o'clock, 2 cm FN, core biopsy on on 8/28/2020 which showed Invasive poorly differentiated ductal carcinoma with areas of micropapillary features\par - [place] score 9/9 (3 + 3 + 3)\par - Invasive tumor measures at least 1.0 cm\par - Ductal carcinoma in situ (focal), cribriform pattern with\par high nuclear grade and central necrosis\par \par  Breast, left, axillary lymph node, core biopsy\par - Invasive poorly differentiated ductal carcinoma with mild lymphocytic infiltrate\par ER+ 80%,[place]+ 50%, HER 2 + breast cancer\par \par She has been on Prempro x 7 years for hot flashes. She is off now x 2 weeks. Hot flashes are ok\par She had breast augmentation with Dr Geiger. Implants exchanged 2019.  Benign biopsy in 2019\par \par 9/30/2020\par 9/25/2020 ECHO LVEF 57%\par PETCT SKUL-[facility] ONC  09/18/2020 No evidence of distant metastasis.\par EKG 2/2020 faxed by PCP , repeat 9/30/2020 done , Chemo consent obtained \par Right side Medport in place, catheter tip at the distal SVC [de-identified] : Ms. KELSY CRANE  is here for a follow up appt for left breast ca, dx 8/28/2020  started neoadjuvant TCHP with onpro on 9/30/2020.  She tested positive for Covid by PCR on 10/30/2020.  A repeat test at 2 weeks was positive again on 11/13/2020.  She was admitted on 11/15/2020 with port site infection.  She was treated with IV antibiotics and was discharged on Zyvox.  Subsequently Covid PCR was done x2 and was negative.  She received cycle 3 on 12/2/2020. S/p admission 12/20/2020-12/28/2020 for port infection, port removed. s/p HP #4 on 1/7 (TC held) and TCHP 4, HP 5 on 1/27. TC # 6, HP# 7 on 3/10/2021. Here for day 8 toxicity check. \par \par She reports mild-moderate fatigue and altered taste x 3-4 days after chemo. She was able to function, maintained PO intake, weight stable. She had mild nausea, took Reglan, no vomiting or mouth sores. She had few episodes of diarrhea, improved with imodium. She had tingling/heavy feeling in her fingertips x 1 day. Resolved now. No Bone pain, no fevers. No hearing issues. No urine c/o. \par echo 12/2020  No cardio pulm sx.  echo 3/2021\par breast mass shrinking but still sizeable and palpable 3x3cm\par Discussed MRI and f/u with DR Rankin. She will continue HP q3w for now, switch to TDM1 post op if residual disease\par

## 2021-03-31 NOTE — PHYSICAL EXAM
[Fully active, able to carry on all pre-disease performance without restriction] : Status 0 - Fully active, able to carry on all pre-disease performance without restriction [Normal] : affect appropriate [de-identified] : Port removed, picc line cdi [de-identified] : BL breasts with mastopexy scars. Left breast with 3x3 cm hard retroareolar mass. Left nipple flattened/inverted. + Left axillary LN, No SCLn

## 2021-03-31 NOTE — ASSESSMENT
[Curative] : Goals of care discussed with patient: Curative [FreeTextEntry1] : Ms. KELSY CRANE is a 60 year old female with clinically T2 N1 Mx poorly differentiated invasive ductal carcinoma ER/TN positive and HER-2/lazara POSITIVE. PET/CT 9/2020 ALEXX. Neoadj TCHP started 9/30/2020. \par \par -Breast ca: Ms. KELSY CRANE  started neoadjuvant TCHP with onpro on 9/30/2020.  She tested positive for Covid by PCR on 10/30/2020.  A repeat test at 2 weeks was positive again on 11/13/2020.  She was admitted on 11/15/2020 with port site infection.  She was treated with IV antibiotics and was discharged on Zyvox.  Subsequently Covid PCR was done x2 and was negative.  She received cycle 3 on 12/2/2020. S/p admission 12/20/2020-12/28/2020 for port infection, port removed. s/p HP #4 on 1/7 (TC held) and TCHP 4, HP 4 on 1/27. TCHP #5 2/17/21. TCHP # 6 on 3/10/21. Here for day 8 toxicity check as patient is on cytotoxic chemotherapy and needs intensive monitoring for severe toxicity. \par Patient is on cytotoxic chemotherapy and needs intensive monitoring for severe toxicity.\par  CBC reviewed with the patient today to make sure she is not neutropenic from high risk cancer therapy drug.  We will continue to monitor CBC every 2 to 4 weeks for intense drug monitoring.\par  - She had partial response in the breast mass. Rec breast MRI for pre operative imaging and response evaluation. D/w navigators to schedule appt with DR Ingram. \par - Continue HP q3w for now, switch to TDM1 if residual disease. Candidate for AI also\par - At risk for chemo induced cardiomyopathy: Check ECHO Q3m.LAst echo: 12/2020, due 3/21\par - Chemo induced anemia- Grade 1. No transfusion needed. Monitor counts  \par - Mild thrombocytopenia 2/2 chemo: no transfusion needed. monitor\par - Mild leukocytosis secondary to ONPRO. \par - Mild mucositis/ oral thrush resolved Nystatin swish and spit 5cc q 8rs for 4 days prn. Continue miracle mouth wash or Carafate.  \par - Chemo induced diarrhea- Continue Imodium prn. IV fluids if sx worsen\par - Chemo induced N/V- Will get premedications with Emend, dexamethasone and Aloxi. she will continue dexamethasone for next 3 days for antinausea prophylaxis. She will use Reglan, Compazine as needed. IV fluid today.\par -Hypomagnesemia- 2/24/21 continue slo Mag TID. will monitor. recheck level today\par - GF induced bone pain- take Claritin. \par - Chemo induced dysgeusia and fatigue: Encourage p.o. fluids, small frequent meals. 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 \par - Chemo induced dermatitis: She has acneiform rash on her chest and face. Improved\par - Instructed to call office and go directly to the emergency room with fever more than 100.4, shaking chills, productive cough, sore throat, shortness of breath or urinary symptoms. Patient verbalized understanding and agreement.\par - Emotional support provided, all questions answered.\par - She will undergo surgery after 6 cycles. +/- RT. Candidate for endocrine therapy.\par - We also discussed genetic testing.  She will be referred for genetic testing at a later time.\par The patient had plenty of time to ask questions and all of his questions were answered to satisfaction. I gave my office phone number and encouraged to call with any questions or additional information. \par  \par Next  tx in 3 weeks\par schedule reviewed\par

## 2021-04-01 ENCOUNTER — NON-APPOINTMENT (OUTPATIENT)
Age: 61
End: 2021-04-01

## 2021-04-01 DIAGNOSIS — R11.2 NAUSEA WITH VOMITING, UNSPECIFIED: ICD-10-CM

## 2021-04-01 DIAGNOSIS — Z51.11 ENCOUNTER FOR ANTINEOPLASTIC CHEMOTHERAPY: ICD-10-CM

## 2021-04-10 ENCOUNTER — APPOINTMENT (OUTPATIENT)
Dept: MRI IMAGING | Facility: IMAGING CENTER | Age: 61
End: 2021-04-10
Payer: COMMERCIAL

## 2021-04-10 ENCOUNTER — OUTPATIENT (OUTPATIENT)
Dept: OUTPATIENT SERVICES | Facility: HOSPITAL | Age: 61
LOS: 1 days | End: 2021-04-10
Payer: COMMERCIAL

## 2021-04-10 DIAGNOSIS — Z86.39 PERSONAL HISTORY OF OTHER ENDOCRINE, NUTRITIONAL AND METABOLIC DISEASE: Chronic | ICD-10-CM

## 2021-04-10 DIAGNOSIS — Z00.8 ENCOUNTER FOR OTHER GENERAL EXAMINATION: ICD-10-CM

## 2021-04-10 DIAGNOSIS — C50.912 MALIGNANT NEOPLASM OF UNSPECIFIED SITE OF LEFT FEMALE BREAST: ICD-10-CM

## 2021-04-10 DIAGNOSIS — Z98.890 OTHER SPECIFIED POSTPROCEDURAL STATES: Chronic | ICD-10-CM

## 2021-04-10 PROCEDURE — 77049 MRI BREAST C-+ W/CAD BI: CPT | Mod: 26

## 2021-04-10 PROCEDURE — C8937: CPT

## 2021-04-10 PROCEDURE — C8908: CPT

## 2021-04-10 PROCEDURE — A9585: CPT

## 2021-04-13 ENCOUNTER — NON-APPOINTMENT (OUTPATIENT)
Age: 61
End: 2021-04-13

## 2021-04-21 ENCOUNTER — APPOINTMENT (OUTPATIENT)
Dept: INFUSION THERAPY | Facility: HOSPITAL | Age: 61
End: 2021-04-21

## 2021-04-21 ENCOUNTER — APPOINTMENT (OUTPATIENT)
Dept: SURGICAL ONCOLOGY | Facility: CLINIC | Age: 61
End: 2021-04-21
Payer: COMMERCIAL

## 2021-04-21 VITALS
HEART RATE: 90 BPM | TEMPERATURE: 97.9 F | HEIGHT: 60.43 IN | BODY MASS INDEX: 31 KG/M2 | SYSTOLIC BLOOD PRESSURE: 107 MMHG | WEIGHT: 160 LBS | OXYGEN SATURATION: 97 % | RESPIRATION RATE: 16 BRPM | DIASTOLIC BLOOD PRESSURE: 74 MMHG

## 2021-04-21 PROCEDURE — 99214 OFFICE O/P EST MOD 30 MIN: CPT

## 2021-04-21 PROCEDURE — 99072 ADDL SUPL MATRL&STAF TM PHE: CPT

## 2021-04-21 NOTE — PHYSICAL EXAM
[Normal] : supple, no neck mass and thyroid not enlarged [Normal Supraclavicular Lymph Nodes] : normal supraclavicular lymph nodes [Normal] : oriented to person, place and time, with appropriate affect [FreeTextEntry1] : VANDANA present during exam [de-identified] : S1S2, normal rate and rhythm, No MRG [de-identified] : Complete breast exam performed in supine and upright position. 1.8 cm irregularly bordered mass at the left areolar margin in the 4-5:00 location with nipple retraction;  Left axillary fullness;  No palpable right breast masses, nipple discharge, skin dimpling or inversion.  No right axillary adenopathy. Healed B/L breast incisions.  [de-identified] : CTA, normal respiratory effort  [de-identified] : left axillary fullness ; left upper arm PICC line

## 2021-04-21 NOTE — ASSESSMENT
[FreeTextEntry1] : IMP:\par 59 y/o female diagnosed with left breast Invasive poorly differentiated ductal carcinoma and DCIS (ER/MI/HER2+) with mets to left axillary lymph node. \par Right breast ADH\par PET CT 9/18/2020- No evidence of distant metastasis\par Received neoadjuvant chemotherapy, continues Herceptin, with a partial response \par \par \par PLAN:\par Pt. will decide whether to pursue unilateral vs. bilateral surgery after completing genetic testing which is pending next week and consultation with Dr. Geiger (plastics). \par  \par

## 2021-04-21 NOTE — HISTORY OF PRESENT ILLNESS
[de-identified] : Ms. Jazlyn Damon is a 61 y/o female presenting for a follow up visit. \par She was seen in initial consultation on 9/2020 for newly diagnosed left breast cancer.  The patient reportedly felt a lump in her left breast for 3 to 4 months and presented to plastic surgeons office. She has no personal history of cancer. She has a history of implant(s) removed in 2019 (Dr. Geiger) and left stereotactic guided biopsy in 2019 - benign. She has been on Prempro x 7 years for hot flashes. \par \par MMG/Sono on 8/19/2020: IMPRESSION:\par - New irregular mass with calcifications and nipple retraction in the left retroareolar location on mammography, corresponding to a mass in the left breast 6:00 location 2 cm from the nipple on the concurrent breast ultrasound. Ultrasound-guided core biopsy is recommended for diagnosis.\par - Abnormal left axillary lymph nodes on mammography and sonography, including lymph nodes demonstrating microcalcifications. Ultrasound-guided core biopsy of a representative abnormal appearing left axillary lymph node with calcifications is recommended.\par - Faint loosely grouped calcifications in the upper central left breast and the upper central right breast which are indeterminate. Sampling stereotactic biopsy of both breasts is recommended.\par - Loosely grouped predominantly coarse dystrophic calcifications associated with areas of central lucency in the posterior medial right breast which are thought to represent fat necrosis. Six-month follow-up right diagnostic mammography is recommended.\par BI-RADS 5\par \par 8/28/2020 : FN core biopsy:\par 1. Breast, left, 6 o'clock, 2 cm FN- Invasive poorly differentiated ductal carcinoma with areas of micropapillary features.  Ductal carcinoma in situ (focal), cribriform pattern with high nuclear grade and central necrosis\par - Lymphovascular permeation by tumor not seen\par -ER/VA/HER2 (+)\par \par 2. Breast, left, axillary lymph node, core biopsy\par - Invasive poorly differentiated ductal carcinoma with mild lymphocytic infiltrate\par \par MRI Breast 9/8/2020- Biopsy proven malignancy in the left retroareolar breast with involvement of the nipple areolar complex. Left axillary lymphadenopathy. Nonmass enhancement in the lower outer right breast for which MRI guided biopsy is recommended. BIRADS 4B. \par \par The patient is also s/p MRI guided core needle biopsy of the right breast on 9/16/2020 with the following pathology: \par -Right breast lower outer quadrant MRI guided bx:  Focal Atypical Duct Hyperplasia, Stromal fibrosis and adenosis\par -Results are concordant. \par \par PET 9/18/2020- Hypermetabolic left retroareolar breast mass and left axillary lymph nodes, corresponding to biopsy proven breast carcinoma and axillary lymph node metastases. No evidence of distant metastases. \par \par She had a right mediport placed for chemotherapy on 9/24/2020 Pt. started neoadjuvant TCHP with onpro on 9/30/2020. She tested positive for Covid by PCR on 10/30/2020. A repeat test at 2 weeks was positive again on 11/13/2020. She was admitted on 11/15/2020 with port site infection. She was treated with IV antibiotics and was discharged on Zyvox. Subsequently Covid PCR was done x2 and was negative. She received cycle 3 on 12/2/2020. S/p admission 12/20/2020-12/28/2020 for port infection, port removed. s/p HP #4 on 1/7 (TC held) and TCHP 4, HP 4 on 1/27. TCHP #5 2/17/21. TCHP # 6 on 3/10/21. She continues Herceptin under the care of Dr. Cassie Bell. \par \par Genetic testing has not yet been performed. \par \par MRI Breast to evaluate response to neoadjuvant chemotherapy performed on 4/10/2021 showed partial response to neoadjuvant chemotherapy.  The left breast mass is slightly decreased in size.  The left axillary lymph nodes have decrease in size, and now demonstrate a normal imaging appearance.  This includes the biopsy proven malignant lymph nodes in the lower left axilla which contains susceptibility artifact from a biopsy marker.  BIRADS 6. \par \par Patient presents to the office today to discuss surgical options.  Doing well. Pt. is scheduled to meet with a genetic counselor next week. \par \par Referring physician: Dr. Geiger\par Heme-onc: Dr. Cassie Bell

## 2021-04-26 ENCOUNTER — OUTPATIENT (OUTPATIENT)
Dept: OUTPATIENT SERVICES | Facility: HOSPITAL | Age: 61
LOS: 1 days | Discharge: HOME | End: 2021-04-26

## 2021-04-26 DIAGNOSIS — Z86.39 PERSONAL HISTORY OF OTHER ENDOCRINE, NUTRITIONAL AND METABOLIC DISEASE: Chronic | ICD-10-CM

## 2021-04-26 DIAGNOSIS — Z98.890 OTHER SPECIFIED POSTPROCEDURAL STATES: Chronic | ICD-10-CM

## 2021-04-26 DIAGNOSIS — C50.919 MALIGNANT NEOPLASM OF UNSPECIFIED SITE OF UNSPECIFIED FEMALE BREAST: ICD-10-CM

## 2021-04-28 ENCOUNTER — APPOINTMENT (OUTPATIENT)
Dept: HEMATOLOGY ONCOLOGY | Facility: CLINIC | Age: 61
End: 2021-04-28

## 2021-04-28 ENCOUNTER — LABORATORY RESULT (OUTPATIENT)
Age: 61
End: 2021-04-28

## 2021-05-07 ENCOUNTER — NON-APPOINTMENT (OUTPATIENT)
Age: 61
End: 2021-05-07

## 2021-05-12 ENCOUNTER — APPOINTMENT (OUTPATIENT)
Dept: HEMATOLOGY ONCOLOGY | Facility: CLINIC | Age: 61
End: 2021-05-12
Payer: COMMERCIAL

## 2021-05-12 ENCOUNTER — APPOINTMENT (OUTPATIENT)
Dept: INFUSION THERAPY | Facility: HOSPITAL | Age: 61
End: 2021-05-12

## 2021-05-12 VITALS
OXYGEN SATURATION: 97 % | HEIGHT: 60.39 IN | BODY MASS INDEX: 31.18 KG/M2 | DIASTOLIC BLOOD PRESSURE: 83 MMHG | SYSTOLIC BLOOD PRESSURE: 121 MMHG | RESPIRATION RATE: 18 BRPM | HEART RATE: 71 BPM | TEMPERATURE: 97.8 F | WEIGHT: 160.92 LBS

## 2021-05-12 PROCEDURE — 99072 ADDL SUPL MATRL&STAF TM PHE: CPT

## 2021-05-12 PROCEDURE — 99215 OFFICE O/P EST HI 40 MIN: CPT

## 2021-05-13 DIAGNOSIS — Z51.11 ENCOUNTER FOR ANTINEOPLASTIC CHEMOTHERAPY: ICD-10-CM

## 2021-05-13 DIAGNOSIS — R11.2 NAUSEA WITH VOMITING, UNSPECIFIED: ICD-10-CM

## 2021-05-16 NOTE — PHYSICAL EXAM
[Fully active, able to carry on all pre-disease performance without restriction] : Status 0 - Fully active, able to carry on all pre-disease performance without restriction [Normal] : affect appropriate [de-identified] : Port removed, picc line cdi [de-identified] : BL breasts with mastopexy scars. Left breast with 3x3 cm hard retroareolar mass. Left nipple flattened/inverted. + Left axillary LN, No SCLn

## 2021-05-16 NOTE — HISTORY OF PRESENT ILLNESS
[de-identified] : Ms.Joanne Damon is a 60 year old female here for an evaluation of breast cancer. Her oncologic history is as follows:\par \par Patient noticed left breast mass for 4 months and presented to plastic surgeon's office. She was sent for breast imaging on 8/19/2020 BIRADS 5 which showed new irregular mass with calcifications and nipple retraction in the left retroareolar location on mammography, corresponding to a mass in the left breast 6:00 location 2 cm from the nipple on the concurrent breast ultrasound. Ultrasound-guided core biopsy is recommended for diagnosis.\par - Abnormal left axillary lymph nodes on mammography and sonography, including lymph nodes demonstrating microcalcifications. Ultrasound-guided core biopsy of a representative abnormal appearing left axillary lymph node with calcifications is recommended.\par - Faint loosely grouped calcifications in the upper central left breast and the upper central right breast which are indeterminate. Sampling stereotactic biopsy of both breasts is recommended.\par - Loosely grouped predominantly coarse dystrophic calcifications associated with areas of central lucency in the posterior medial right breast which are thought to represent fat necrosis. Six-month follow-up right diagnostic mammography is recommended. She underwent New irregular mass with calcifications and nipple retraction in the left retroareolar location on mammography, corresponding to a mass in the left breast 6:00 location 2 cm from the nipple on the concurrent breast ultrasound. Ultrasound-guided core biopsy is recommended for diagnosis.\par  She had a left, 6 o'clock, 2 cm FN, core biopsy on on 8/28/2020 which showed Invasive poorly differentiated ductal carcinoma with areas of micropapillary features\par - [place] score 9/9 (3 + 3 + 3)\par - Invasive tumor measures at least 1.0 cm\par - Ductal carcinoma in situ (focal), cribriform pattern with\par high nuclear grade and central necrosis\par \par  Breast, left, axillary lymph node, core biopsy\par - Invasive poorly differentiated ductal carcinoma with mild lymphocytic infiltrate\par ER+ 80%,IN+ 50%, HER 2 + breast cancer\par \par She has been on Prempro x 7 years for hot flashes. She is off now x 2 weeks. Hot flashes are ok\par She had breast augmentation with Dr Geiger. Implants exchanged 2019.  Benign biopsy in 2019\par \par 9/30/2020\par 9/25/2020 ECHO LVEF 57%\par [facility]CT [facility]  09/18/2020 No evidence of distant metastasis.\par EKG 2/2020 faxed by PCP , repeat 9/30/2020 done , Chemo consent obtained \par Right side Medport in place, catheter tip at the distal SVC [de-identified] : Ms. KELSY CRANE  is here for a follow up appt for left breast ca, dx 8/28/2020  started neoadjuvant TCHP with onpro on 9/30/2020.  She tested positive for Covid by PCR on 10/30/2020.  A repeat test at 2 weeks was positive again on 11/13/2020.  She was admitted on 11/15/2020 with port site infection.  She was treated with IV antibiotics and was discharged on Zyvox.  Subsequently Covid PCR was done x2 and was negative.  She received cycle 3 on 12/2/2020. S/p admission 12/20/2020-12/28/2020 for port infection, port removed. s/p HP #4 on 1/7 (TC held) and TCHP 4, HP 5 on 1/27. TC # 6, HP# 7 on 3/10/2021. \par \par She is here for HP # 9\par She saw Dr Rankin and is waiting to meet with Plastic surgery. She also underwent genetic testing. She is undecided about surgery. She is waiting for BRCA gene. Rec to schedule surgery ASAP. She is concerned about getting implants again. She has implants for many years and now hoping to get MADISON.\par Chemo s/e are resolved. Eating good, wt stable. Diarrhea resolved, no neuropathy\par picc line in left arm- rec to remove during surgery as its on the same side as surgery\par echo 12/2020  No cardio pulm sx.  echo 3/2021, \par breast mass shrinking but still palpable. MRI 4/20210 results d/w her. She has partial response. She will continue HP q3w for now, switch to TDM1 post op if residual disease. \par s/p covid vaccine x 2

## 2021-05-16 NOTE — REASON FOR VISIT
[Follow-Up Visit] : a follow-up [Other: _____] : [unfilled] [FreeTextEntry2] : ER+ 80%,NH+ 50%, HER 2 + breast cancer

## 2021-05-16 NOTE — ASSESSMENT
[Curative] : Goals of care discussed with patient: Curative [FreeTextEntry1] : Ms. KELSY CRANE is a 60 year old female with clinically T2 N1 Mx poorly differentiated invasive ductal carcinoma ER/MD positive and HER-2/lazara POSITIVE. PET/CT 9/2020 ALEXX. Neoadj TCHP started 9/30/2020. \par \par -Breast ca: Ms. KELSY CRANE  started neoadjuvant TCHP with onpro on 9/30/2020.  She tested positive for Covid by PCR on 10/30/2020.  A repeat test at 2 weeks was positive again on 11/13/2020.  She was admitted on 11/15/2020 with port site infection.  She was treated with IV antibiotics and was discharged on Zyvox.  Subsequently Covid PCR was done x2 and was negative.  She received cycle 3 on 12/2/2020. S/p admission 12/20/2020-12/28/2020 for port infection, port removed. s/p HP #4 on 1/7 (TC held) and TCHP 4, HP 4 on 1/27. TCHP #5 2/17/21. TCHP # 6 on 3/10/21. \par - She is here for HP # 9. breast mass shrinking but still palpable. MRI 4/20210 results d/w her. She has partial response. She saw Dr Rankin and is waiting to meet with Plastic surgery. She also underwent genetic testing. She is undecided about surgery. She is waiting for BRCA gene. Rec to schedule surgery ASAP. She is concerned about getting implants again. She has implants for many years and now hoping to get MADISON. SHe has multiple questions about surgery, healing, reconstruction, timing etc. All questions were answered. She will continue HP q3w for now, switch to TDM1 post op if residual disease.  Candidate for AI also\par - PICC line: picc line in left arm- rec to remove during surgery as its on the same side as surgery\par - At risk for chemo induced cardiomyopathy: Check ECHO Q3m.LAst echo: 12/2020, 3/2021, due 6/2021\par - Chemo induced anemia- Grade 1. No transfusion needed. Monitor counts  \par - Mild thrombocytopenia 2/2 chemo: no transfusion needed. monitor\par -Hypomagnesemia- 2/24/21 continue slo Mag TID. will monitor. recheck level today\par  \par Next  tx in 3 weeks\par schedule reviewed\par CHEMO ORDERS AND LABS IN [place]

## 2021-05-21 ENCOUNTER — NON-APPOINTMENT (OUTPATIENT)
Age: 61
End: 2021-05-21

## 2021-05-24 ENCOUNTER — APPOINTMENT (OUTPATIENT)
Dept: CARDIOLOGY | Facility: CLINIC | Age: 61
End: 2021-05-24
Payer: COMMERCIAL

## 2021-05-24 PROCEDURE — 93356 MYOCRD STRAIN IMG SPCKL TRCK: CPT

## 2021-05-24 PROCEDURE — 93306 TTE W/DOPPLER COMPLETE: CPT

## 2021-05-24 PROCEDURE — 99072 ADDL SUPL MATRL&STAF TM PHE: CPT

## 2021-05-28 ENCOUNTER — OUTPATIENT (OUTPATIENT)
Dept: OUTPATIENT SERVICES | Facility: HOSPITAL | Age: 61
LOS: 1 days | Discharge: HOME | End: 2021-05-28

## 2021-05-28 DIAGNOSIS — Z98.890 OTHER SPECIFIED POSTPROCEDURAL STATES: Chronic | ICD-10-CM

## 2021-05-28 DIAGNOSIS — C50.919 MALIGNANT NEOPLASM OF UNSPECIFIED SITE OF UNSPECIFIED FEMALE BREAST: ICD-10-CM

## 2021-05-28 DIAGNOSIS — Z86.39 PERSONAL HISTORY OF OTHER ENDOCRINE, NUTRITIONAL AND METABOLIC DISEASE: Chronic | ICD-10-CM

## 2021-06-02 ENCOUNTER — APPOINTMENT (OUTPATIENT)
Dept: INFUSION THERAPY | Facility: HOSPITAL | Age: 61
End: 2021-06-02

## 2021-06-03 DIAGNOSIS — Z51.11 ENCOUNTER FOR ANTINEOPLASTIC CHEMOTHERAPY: ICD-10-CM

## 2021-06-03 DIAGNOSIS — R11.2 NAUSEA WITH VOMITING, UNSPECIFIED: ICD-10-CM

## 2021-06-08 ENCOUNTER — APPOINTMENT (OUTPATIENT)
Dept: PLASTIC SURGERY | Facility: CLINIC | Age: 61
End: 2021-06-08
Payer: COMMERCIAL

## 2021-06-08 VITALS
TEMPERATURE: 97.4 F | OXYGEN SATURATION: 97 % | WEIGHT: 160 LBS | HEIGHT: 62 IN | SYSTOLIC BLOOD PRESSURE: 116 MMHG | DIASTOLIC BLOOD PRESSURE: 74 MMHG | BODY MASS INDEX: 29.44 KG/M2 | HEART RATE: 69 BPM

## 2021-06-08 PROCEDURE — 99072 ADDL SUPL MATRL&STAF TM PHE: CPT

## 2021-06-08 PROCEDURE — 99215 OFFICE O/P EST HI 40 MIN: CPT

## 2021-06-08 PROCEDURE — 99245 OFF/OP CONSLTJ NEW/EST HI 55: CPT

## 2021-06-08 NOTE — CONSULT LETTER
[Dear  ___] : Dear ~JODI, [Consult Letter:] : I had the pleasure of evaluating your patient, [unfilled]. [Please see my note below.] : Please see my note below. [Consult Closing:] : Thank you very much for allowing me to participate in the care of this patient.  If you have any questions, please do not hesitate to contact me. [Sincerely,] : Sincerely, [DrLeslie  ___] : Dr. ENAMORADO [FreeTextEntry2] : Husam Rankin MD [FreeTextEntry3] : Tyrone Levi MD, FACS\par Professor and System Vice-Chair, Department of Surgery, [facility]\par Director of Oncologic Reconstruction, [facility]\par , [facility]\par \par [facility]\par [address] [place] [address]\par [place] [address]\par Phone: [phone]\par Fax: [phone]\par Email: [email or web address]\par

## 2021-06-08 NOTE — PHYSICAL EXAM
[Bra Size: _______] : Bra Size: [unfilled] [de-identified] : b/l breasts soft, nt. +ptosis, +wise pattern scars. Left nipple retracted and tumor palpable at inferolateral boarder of nipple. Bilateral high riding NACs with pseudoptosis. Left axillary fullness. [de-identified] : abdomen soft, nt. +excess lower abdominal soft tissue adequate for b/l breast reconstruction. No abdominal scars appreciated.

## 2021-06-08 NOTE — HISTORY OF PRESENT ILLNESS
[FreeTextEntry1] : 60 y.o.  F history of left breast CA (ER+, RI+, HER2+) with axillary lymph node involvement diagnosed 2020 s/p completing chemo 2021; currently on herceptin, she presents today seeking breast reconstruction consultation. Genetic testing negative.\par \par Other PMHx significant for hyperlipidema controlled on simvastatin. Pt has history of COVID infection in 10/2020; she also has history of port site infection for which she was hospitalized for in 2020. \par \par  Family history significant for mother diagnosed with cervical cancer age 21 and lung CA early 70's, paternal cousin with breast CA diagnosed unknown age, father with stomach cancer. \par \par Patient has history of b/l breast augmentation in  s/p removal of b/l silicone implants with mastopexy with Dr. Geiger in . \par

## 2021-06-08 NOTE — REASON FOR VISIT
[Consultation] : a consultation visit [Other: _____] : [unfilled] [FreeTextEntry1] : bilateral breast reconstruction

## 2021-06-16 ENCOUNTER — NON-APPOINTMENT (OUTPATIENT)
Age: 61
End: 2021-06-16

## 2021-06-23 ENCOUNTER — RESULT REVIEW (OUTPATIENT)
Age: 61
End: 2021-06-23

## 2021-06-23 ENCOUNTER — LABORATORY RESULT (OUTPATIENT)
Age: 61
End: 2021-06-23

## 2021-06-23 ENCOUNTER — APPOINTMENT (OUTPATIENT)
Dept: INFUSION THERAPY | Facility: HOSPITAL | Age: 61
End: 2021-06-23

## 2021-06-23 LAB
BASOPHILS # BLD AUTO: 0.03 K/UL — SIGNIFICANT CHANGE UP (ref 0–0.2)
BASOPHILS NFR BLD AUTO: 0.8 % — SIGNIFICANT CHANGE UP (ref 0–2)
EOSINOPHIL # BLD AUTO: 0.04 K/UL — SIGNIFICANT CHANGE UP (ref 0–0.5)
EOSINOPHIL NFR BLD AUTO: 1.1 % — SIGNIFICANT CHANGE UP (ref 0–6)
HCT VFR BLD CALC: 34.2 % — LOW (ref 34.5–45)
HGB BLD-MCNC: 10.7 G/DL — LOW (ref 11.5–15.5)
IMM GRANULOCYTES NFR BLD AUTO: 0.3 % — SIGNIFICANT CHANGE UP (ref 0–1.5)
LYMPHOCYTES # BLD AUTO: 1.25 K/UL — SIGNIFICANT CHANGE UP (ref 1–3.3)
LYMPHOCYTES # BLD AUTO: 33 % — SIGNIFICANT CHANGE UP (ref 13–44)
MCHC RBC-ENTMCNC: 31 PG — SIGNIFICANT CHANGE UP (ref 27–34)
MCHC RBC-ENTMCNC: 31.3 G/DL — LOW (ref 32–36)
MCV RBC AUTO: 99.1 FL — SIGNIFICANT CHANGE UP (ref 80–100)
MONOCYTES # BLD AUTO: 0.32 K/UL — SIGNIFICANT CHANGE UP (ref 0–0.9)
MONOCYTES NFR BLD AUTO: 8.4 % — SIGNIFICANT CHANGE UP (ref 2–14)
NEUTROPHILS # BLD AUTO: 2.14 K/UL — SIGNIFICANT CHANGE UP (ref 1.8–7.4)
NEUTROPHILS NFR BLD AUTO: 56.4 % — SIGNIFICANT CHANGE UP (ref 43–77)
NRBC # BLD: 0 /100 WBCS — SIGNIFICANT CHANGE UP (ref 0–0)
PLATELET # BLD AUTO: 188 K/UL — SIGNIFICANT CHANGE UP (ref 150–400)
RBC # BLD: 3.45 M/UL — LOW (ref 3.8–5.2)
RBC # FLD: 13.2 % — SIGNIFICANT CHANGE UP (ref 10.3–14.5)
WBC # BLD: 3.79 K/UL — LOW (ref 3.8–10.5)
WBC # FLD AUTO: 3.79 K/UL — LOW (ref 3.8–10.5)

## 2021-06-25 ENCOUNTER — APPOINTMENT (OUTPATIENT)
Dept: MRI IMAGING | Facility: IMAGING CENTER | Age: 61
End: 2021-06-25
Payer: COMMERCIAL

## 2021-06-25 ENCOUNTER — OUTPATIENT (OUTPATIENT)
Dept: OUTPATIENT SERVICES | Facility: HOSPITAL | Age: 61
LOS: 1 days | End: 2021-06-25
Payer: COMMERCIAL

## 2021-06-25 DIAGNOSIS — C50.912 MALIGNANT NEOPLASM OF UNSPECIFIED SITE OF LEFT FEMALE BREAST: ICD-10-CM

## 2021-06-25 DIAGNOSIS — Z98.890 OTHER SPECIFIED POSTPROCEDURAL STATES: Chronic | ICD-10-CM

## 2021-06-25 DIAGNOSIS — Z86.39 PERSONAL HISTORY OF OTHER ENDOCRINE, NUTRITIONAL AND METABOLIC DISEASE: Chronic | ICD-10-CM

## 2021-06-25 DIAGNOSIS — Z00.8 ENCOUNTER FOR OTHER GENERAL EXAMINATION: ICD-10-CM

## 2021-06-25 PROCEDURE — A9585: CPT

## 2021-06-25 PROCEDURE — C8902: CPT

## 2021-06-25 PROCEDURE — 72198 MR ANGIO PELVIS W/O & W/DYE: CPT | Mod: 26

## 2021-06-25 PROCEDURE — C8920: CPT

## 2021-06-25 PROCEDURE — 74185 MRA ABD W OR W/O CNTRST: CPT | Mod: 26

## 2021-06-28 ENCOUNTER — RESULT REVIEW (OUTPATIENT)
Age: 61
End: 2021-06-28

## 2021-06-28 ENCOUNTER — OUTPATIENT (OUTPATIENT)
Dept: OUTPATIENT SERVICES | Facility: HOSPITAL | Age: 61
LOS: 1 days | End: 2021-06-28

## 2021-06-28 DIAGNOSIS — C50.912 MALIGNANT NEOPLASM OF UNSPECIFIED SITE OF LEFT FEMALE BREAST: ICD-10-CM

## 2021-06-28 DIAGNOSIS — Z86.39 PERSONAL HISTORY OF OTHER ENDOCRINE, NUTRITIONAL AND METABOLIC DISEASE: Chronic | ICD-10-CM

## 2021-06-28 DIAGNOSIS — Z98.890 OTHER SPECIFIED POSTPROCEDURAL STATES: Chronic | ICD-10-CM

## 2021-06-28 LAB — SURGICAL PATHOLOGY STUDY: SIGNIFICANT CHANGE UP

## 2021-07-01 ENCOUNTER — OUTPATIENT (OUTPATIENT)
Dept: OUTPATIENT SERVICES | Facility: HOSPITAL | Age: 61
LOS: 1 days | End: 2021-07-01
Payer: COMMERCIAL

## 2021-07-01 ENCOUNTER — NON-APPOINTMENT (OUTPATIENT)
Age: 61
End: 2021-07-01

## 2021-07-01 VITALS
OXYGEN SATURATION: 100 % | HEIGHT: 60.5 IN | TEMPERATURE: 98 F | RESPIRATION RATE: 16 BRPM | DIASTOLIC BLOOD PRESSURE: 81 MMHG | SYSTOLIC BLOOD PRESSURE: 120 MMHG | HEART RATE: 70 BPM | WEIGHT: 156.53 LBS

## 2021-07-01 DIAGNOSIS — Z98.890 OTHER SPECIFIED POSTPROCEDURAL STATES: Chronic | ICD-10-CM

## 2021-07-01 DIAGNOSIS — Z01.818 ENCOUNTER FOR OTHER PREPROCEDURAL EXAMINATION: ICD-10-CM

## 2021-07-01 DIAGNOSIS — K21.9 GASTRO-ESOPHAGEAL REFLUX DISEASE WITHOUT ESOPHAGITIS: ICD-10-CM

## 2021-07-01 DIAGNOSIS — C50.912 MALIGNANT NEOPLASM OF UNSPECIFIED SITE OF LEFT FEMALE BREAST: ICD-10-CM

## 2021-07-01 DIAGNOSIS — E78.5 HYPERLIPIDEMIA, UNSPECIFIED: ICD-10-CM

## 2021-07-01 DIAGNOSIS — Z86.39 PERSONAL HISTORY OF OTHER ENDOCRINE, NUTRITIONAL AND METABOLIC DISEASE: Chronic | ICD-10-CM

## 2021-07-01 DIAGNOSIS — K46.9 UNSPECIFIED ABDOMINAL HERNIA WITHOUT OBSTRUCTION OR GANGRENE: ICD-10-CM

## 2021-07-01 LAB — BLD GP AB SCN SERPL QL: SIGNIFICANT CHANGE UP

## 2021-07-01 PROCEDURE — 86901 BLOOD TYPING SEROLOGIC RH(D): CPT

## 2021-07-01 PROCEDURE — 93005 ELECTROCARDIOGRAM TRACING: CPT

## 2021-07-01 PROCEDURE — G0463: CPT

## 2021-07-01 PROCEDURE — 36415 COLL VENOUS BLD VENIPUNCTURE: CPT

## 2021-07-01 PROCEDURE — 86850 RBC ANTIBODY SCREEN: CPT

## 2021-07-01 PROCEDURE — 86900 BLOOD TYPING SEROLOGIC ABO: CPT

## 2021-07-01 PROCEDURE — 93010 ELECTROCARDIOGRAM REPORT: CPT | Mod: NC

## 2021-07-01 NOTE — H&P PST ADULT - ASSESSMENT
Patient is scheduled for a bilateral MADISON reconstruction, b/l internal mammary lymph node biopsy, bilateral partial rib resection, repair of abdominal hernia, abdominal mesh placement on 7/15/2021

## 2021-07-01 NOTE — H&P PST ADULT - HISTORY OF PRESENT ILLNESS
This is 60 year old female with history of breast cancer in left breast.  Patient was diagnosed with breast cancer 8/2020. Last date of chemo 3/10/21. HER 2 and Prejeta. Patient has history of GERD

## 2021-07-01 NOTE — H&P PST ADULT - NSICDXPASTMEDICALHX_GEN_ALL_CORE_FT
PAST MEDICAL HISTORY:  Breast cancer, left     Dyslipidemia     GERD (gastroesophageal reflux disease)     History of abdominal hernia     Obesity

## 2021-07-01 NOTE — H&P PST ADULT - NSICDXPROBLEM_GEN_ALL_CORE_FT
PROBLEM DIAGNOSES  Problem: Breast cancer, left  Assessment and Plan: patient provided with pre-operative instructions and verbalized understanding.  Patient advised to take Omeprazole with sips of water on day of surgery but will not eat/drink/smoke.  She will stop NSAIDs, aspirin, herbal supplements and multivitamins.  Chlorohexadine wash provided with instructions.  Incentive spirometer teaching performed and patient was able to demonstrate understanding.  Pending medical clearance from PCP as requested by surgeon    Problem: Abdominal hernia  Assessment and Plan:     Problem: Dyslipidemia  Assessment and Plan: continue meds    Problem: GERD (gastroesophageal reflux disease)  Assessment and Plan: continue meds

## 2021-07-01 NOTE — H&P PST ADULT - HEIGHT IN CM
[address] [facility]  [address], [address] [address]  Phone: [phone]  Fax: [phone]  Follow Up Time: 1-3 Days

## 2021-07-01 NOTE — H&P PST ADULT -  CURRENT SWALLOWING
Mother informed that lead level decreased from a 7 to a 4 and she was instructed to keep up the good work with decreasing lead exposure  Mother will call back with any concerns  (0) swallows foods and liquids w/o difficulty

## 2021-07-02 PROBLEM — K21.9 GASTRO-ESOPHAGEAL REFLUX DISEASE WITHOUT ESOPHAGITIS: Chronic | Status: ACTIVE | Noted: 2021-07-01

## 2021-07-02 PROBLEM — Z87.19 PERSONAL HISTORY OF OTHER DISEASES OF THE DIGESTIVE SYSTEM: Chronic | Status: ACTIVE | Noted: 2021-07-01

## 2021-07-08 ENCOUNTER — APPOINTMENT (OUTPATIENT)
Dept: ULTRASOUND IMAGING | Facility: IMAGING CENTER | Age: 61
End: 2021-07-08
Payer: COMMERCIAL

## 2021-07-08 ENCOUNTER — RESULT REVIEW (OUTPATIENT)
Age: 61
End: 2021-07-08

## 2021-07-08 ENCOUNTER — OUTPATIENT (OUTPATIENT)
Dept: OUTPATIENT SERVICES | Facility: HOSPITAL | Age: 61
LOS: 1 days | End: 2021-07-08
Payer: COMMERCIAL

## 2021-07-08 DIAGNOSIS — Z98.890 OTHER SPECIFIED POSTPROCEDURAL STATES: Chronic | ICD-10-CM

## 2021-07-08 DIAGNOSIS — Z86.39 PERSONAL HISTORY OF OTHER ENDOCRINE, NUTRITIONAL AND METABOLIC DISEASE: Chronic | ICD-10-CM

## 2021-07-08 DIAGNOSIS — Z00.8 ENCOUNTER FOR OTHER GENERAL EXAMINATION: ICD-10-CM

## 2021-07-08 PROCEDURE — 19285 PERQ DEV BREAST 1ST US IMAG: CPT | Mod: LT

## 2021-07-08 PROCEDURE — 19285 PERQ DEV BREAST 1ST US IMAG: CPT

## 2021-07-08 PROCEDURE — C1739: CPT

## 2021-07-09 ENCOUNTER — OUTPATIENT (OUTPATIENT)
Dept: OUTPATIENT SERVICES | Facility: HOSPITAL | Age: 61
LOS: 1 days | Discharge: HOME | End: 2021-07-09

## 2021-07-09 DIAGNOSIS — Z86.39 PERSONAL HISTORY OF OTHER ENDOCRINE, NUTRITIONAL AND METABOLIC DISEASE: Chronic | ICD-10-CM

## 2021-07-09 DIAGNOSIS — C50.919 MALIGNANT NEOPLASM OF UNSPECIFIED SITE OF UNSPECIFIED FEMALE BREAST: ICD-10-CM

## 2021-07-09 DIAGNOSIS — Z98.890 OTHER SPECIFIED POSTPROCEDURAL STATES: Chronic | ICD-10-CM

## 2021-07-14 ENCOUNTER — APPOINTMENT (OUTPATIENT)
Dept: INFUSION THERAPY | Facility: HOSPITAL | Age: 61
End: 2021-07-14

## 2021-07-14 ENCOUNTER — TRANSCRIPTION ENCOUNTER (OUTPATIENT)
Age: 61
End: 2021-07-14

## 2021-07-14 ENCOUNTER — APPOINTMENT (OUTPATIENT)
Dept: HEMATOLOGY ONCOLOGY | Facility: CLINIC | Age: 61
End: 2021-07-14
Payer: COMMERCIAL

## 2021-07-14 VITALS
DIASTOLIC BLOOD PRESSURE: 77 MMHG | BODY MASS INDEX: 29.26 KG/M2 | HEIGHT: 61.14 IN | OXYGEN SATURATION: 97 % | SYSTOLIC BLOOD PRESSURE: 117 MMHG | WEIGHT: 154.96 LBS | HEART RATE: 81 BPM | RESPIRATION RATE: 16 BRPM | TEMPERATURE: 97.2 F

## 2021-07-14 DIAGNOSIS — R11.2 NAUSEA WITH VOMITING, UNSPECIFIED: ICD-10-CM

## 2021-07-14 DIAGNOSIS — Z51.11 ENCOUNTER FOR ANTINEOPLASTIC CHEMOTHERAPY: ICD-10-CM

## 2021-07-14 PROCEDURE — 99215 OFFICE O/P EST HI 40 MIN: CPT

## 2021-07-14 PROCEDURE — 99072 ADDL SUPL MATRL&STAF TM PHE: CPT

## 2021-07-15 ENCOUNTER — INPATIENT (INPATIENT)
Facility: HOSPITAL | Age: 61
LOS: 1 days | Discharge: HOME | DRG: 580 | End: 2021-07-17
Attending: INTERNAL MEDICINE | Admitting: SURGERY
Payer: COMMERCIAL

## 2021-07-15 ENCOUNTER — APPOINTMENT (OUTPATIENT)
Dept: PLASTIC SURGERY | Facility: HOSPITAL | Age: 61
End: 2021-07-15
Payer: COMMERCIAL

## 2021-07-15 ENCOUNTER — RESULT REVIEW (OUTPATIENT)
Age: 61
End: 2021-07-15

## 2021-07-15 ENCOUNTER — APPOINTMENT (OUTPATIENT)
Dept: SURGICAL ONCOLOGY | Facility: HOSPITAL | Age: 61
End: 2021-07-15

## 2021-07-15 VITALS
WEIGHT: 156.53 LBS | SYSTOLIC BLOOD PRESSURE: 126 MMHG | DIASTOLIC BLOOD PRESSURE: 78 MMHG | TEMPERATURE: 98 F | HEIGHT: 60.5 IN | RESPIRATION RATE: 16 BRPM | HEART RATE: 75 BPM | OXYGEN SATURATION: 99 %

## 2021-07-15 DIAGNOSIS — C50.912 MALIGNANT NEOPLASM OF UNSPECIFIED SITE OF LEFT FEMALE BREAST: ICD-10-CM

## 2021-07-15 DIAGNOSIS — Z98.890 OTHER SPECIFIED POSTPROCEDURAL STATES: Chronic | ICD-10-CM

## 2021-07-15 DIAGNOSIS — Z86.39 PERSONAL HISTORY OF OTHER ENDOCRINE, NUTRITIONAL AND METABOLIC DISEASE: Chronic | ICD-10-CM

## 2021-07-15 LAB — ABO RH CONFIRMATION: SIGNIFICANT CHANGE UP

## 2021-07-15 PROCEDURE — 88360 TUMOR IMMUNOHISTOCHEM/MANUAL: CPT | Mod: 26

## 2021-07-15 PROCEDURE — 38530 BIOPSY/REMOVAL LYMPH NODES: CPT | Mod: RT

## 2021-07-15 PROCEDURE — 88307 TISSUE EXAM BY PATHOLOGIST: CPT | Mod: 26

## 2021-07-15 PROCEDURE — 21600 PARTIAL REMOVAL OF RIB: CPT | Mod: RT,59

## 2021-07-15 PROCEDURE — 38792 RA TRACER ID OF SENTINL NODE: CPT | Mod: 50

## 2021-07-15 PROCEDURE — 38740 REMOVE ARMPIT LYMPH NODES: CPT | Mod: AS,59,RT

## 2021-07-15 PROCEDURE — 38530 BIOPSY/REMOVAL LYMPH NODES: CPT | Mod: LT

## 2021-07-15 PROCEDURE — 21600 PARTIAL REMOVAL OF RIB: CPT | Mod: LT,59

## 2021-07-15 PROCEDURE — S2068: CPT | Mod: 82,LT

## 2021-07-15 PROCEDURE — S2068: CPT | Mod: 82,RT

## 2021-07-15 PROCEDURE — S2068: CPT | Mod: RT

## 2021-07-15 PROCEDURE — 88334 PATH CONSLTJ SURG CYTO XM EA: CPT | Mod: 26

## 2021-07-15 PROCEDURE — 19305 MAST RADICAL: CPT | Mod: AS,50

## 2021-07-15 PROCEDURE — 38740 REMOVE ARMPIT LYMPH NODES: CPT | Mod: RT

## 2021-07-15 PROCEDURE — 88333 PATH CONSLTJ SURG CYTO XM 1: CPT | Mod: 26

## 2021-07-15 PROCEDURE — 88305 TISSUE EXAM BY PATHOLOGIST: CPT | Mod: 26

## 2021-07-15 PROCEDURE — 76098 X-RAY EXAM SURGICAL SPECIMEN: CPT | Mod: 26

## 2021-07-15 PROCEDURE — 19305 MAST RADICAL: CPT | Mod: 50

## 2021-07-15 PROCEDURE — 38745 REMOVE ARMPIT LYMPH NODES: CPT | Mod: LT

## 2021-07-15 PROCEDURE — 38745 REMOVE ARMPIT LYMPH NODES: CPT | Mod: AS,59,LT

## 2021-07-15 RX ORDER — ATORVASTATIN CALCIUM 80 MG/1
10 TABLET, FILM COATED ORAL AT BEDTIME
Refills: 0 | Status: DISCONTINUED | OUTPATIENT
Start: 2021-07-15 | End: 2021-07-17

## 2021-07-15 RX ORDER — ONDANSETRON 8 MG/1
4 TABLET, FILM COATED ORAL EVERY 6 HOURS
Refills: 0 | Status: DISCONTINUED | OUTPATIENT
Start: 2021-07-15 | End: 2021-07-17

## 2021-07-15 RX ORDER — SODIUM CHLORIDE 9 MG/ML
1000 INJECTION, SOLUTION INTRAVENOUS
Refills: 0 | Status: DISCONTINUED | OUTPATIENT
Start: 2021-07-15 | End: 2021-07-17

## 2021-07-15 RX ORDER — HYDROMORPHONE HYDROCHLORIDE 2 MG/ML
0.5 INJECTION INTRAMUSCULAR; INTRAVENOUS; SUBCUTANEOUS EVERY 4 HOURS
Refills: 0 | Status: DISCONTINUED | OUTPATIENT
Start: 2021-07-15 | End: 2021-07-17

## 2021-07-15 RX ORDER — ONDANSETRON 8 MG/1
4 TABLET, FILM COATED ORAL ONCE
Refills: 0 | Status: DISCONTINUED | OUTPATIENT
Start: 2021-07-15 | End: 2021-07-15

## 2021-07-15 RX ORDER — CEFAZOLIN SODIUM 1 G
2000 VIAL (EA) INJECTION EVERY 8 HOURS
Refills: 0 | Status: COMPLETED | OUTPATIENT
Start: 2021-07-15 | End: 2021-07-16

## 2021-07-15 RX ORDER — ACETAMINOPHEN 500 MG
650 TABLET ORAL EVERY 6 HOURS
Refills: 0 | Status: DISCONTINUED | OUTPATIENT
Start: 2021-07-15 | End: 2021-07-17

## 2021-07-15 RX ORDER — SODIUM CHLORIDE 9 MG/ML
1000 INJECTION, SOLUTION INTRAVENOUS
Refills: 0 | Status: DISCONTINUED | OUTPATIENT
Start: 2021-07-15 | End: 2021-07-15

## 2021-07-15 RX ORDER — ACETAMINOPHEN 500 MG
1000 TABLET ORAL EVERY 6 HOURS
Refills: 0 | Status: COMPLETED | OUTPATIENT
Start: 2021-07-15 | End: 2021-07-16

## 2021-07-15 RX ORDER — OXYCODONE HYDROCHLORIDE 5 MG/1
5 TABLET ORAL EVERY 4 HOURS
Refills: 0 | Status: DISCONTINUED | OUTPATIENT
Start: 2021-07-15 | End: 2021-07-17

## 2021-07-15 RX ORDER — HYDROMORPHONE HYDROCHLORIDE 2 MG/ML
1 INJECTION INTRAMUSCULAR; INTRAVENOUS; SUBCUTANEOUS
Refills: 0 | Status: DISCONTINUED | OUTPATIENT
Start: 2021-07-15 | End: 2021-07-15

## 2021-07-15 RX ORDER — ENOXAPARIN SODIUM 100 MG/ML
40 INJECTION SUBCUTANEOUS ONCE
Refills: 0 | Status: DISCONTINUED | OUTPATIENT
Start: 2021-07-15 | End: 2021-07-15

## 2021-07-15 RX ORDER — SIMVASTATIN 20 MG/1
20 TABLET, FILM COATED ORAL AT BEDTIME
Refills: 0 | Status: DISCONTINUED | OUTPATIENT
Start: 2021-07-15 | End: 2021-07-15

## 2021-07-15 RX ORDER — PANTOPRAZOLE SODIUM 20 MG/1
40 TABLET, DELAYED RELEASE ORAL
Refills: 0 | Status: DISCONTINUED | OUTPATIENT
Start: 2021-07-15 | End: 2021-07-17

## 2021-07-15 RX ORDER — HYDROMORPHONE HYDROCHLORIDE 2 MG/ML
0.5 INJECTION INTRAMUSCULAR; INTRAVENOUS; SUBCUTANEOUS
Refills: 0 | Status: DISCONTINUED | OUTPATIENT
Start: 2021-07-15 | End: 2021-07-15

## 2021-07-15 RX ORDER — KETOROLAC TROMETHAMINE 30 MG/ML
15 SYRINGE (ML) INJECTION EVERY 6 HOURS
Refills: 0 | Status: DISCONTINUED | OUTPATIENT
Start: 2021-07-15 | End: 2021-07-17

## 2021-07-15 RX ORDER — SENNA PLUS 8.6 MG/1
2 TABLET ORAL AT BEDTIME
Refills: 0 | Status: DISCONTINUED | OUTPATIENT
Start: 2021-07-15 | End: 2021-07-17

## 2021-07-15 RX ORDER — ENOXAPARIN SODIUM 100 MG/ML
40 INJECTION SUBCUTANEOUS DAILY
Refills: 0 | Status: DISCONTINUED | OUTPATIENT
Start: 2021-07-16 | End: 2021-07-17

## 2021-07-15 RX ADMIN — OXYCODONE HYDROCHLORIDE 5 MILLIGRAM(S): 5 TABLET ORAL at 23:38

## 2021-07-15 RX ADMIN — Medication 1000 MILLIGRAM(S): at 18:00

## 2021-07-15 RX ADMIN — Medication 100 MILLIGRAM(S): at 22:24

## 2021-07-15 RX ADMIN — Medication 15 MILLIGRAM(S): at 17:26

## 2021-07-15 RX ADMIN — Medication 400 MILLIGRAM(S): at 21:29

## 2021-07-15 RX ADMIN — SENNA PLUS 2 TABLET(S): 8.6 TABLET ORAL at 22:23

## 2021-07-15 RX ADMIN — OXYCODONE HYDROCHLORIDE 5 MILLIGRAM(S): 5 TABLET ORAL at 22:34

## 2021-07-15 RX ADMIN — Medication 400 MILLIGRAM(S): at 17:00

## 2021-07-15 RX ADMIN — Medication 1000 MILLIGRAM(S): at 22:38

## 2021-07-15 RX ADMIN — Medication 15 MILLIGRAM(S): at 18:00

## 2021-07-15 RX ADMIN — SODIUM CHLORIDE 50 MILLILITER(S): 9 INJECTION, SOLUTION INTRAVENOUS at 06:47

## 2021-07-15 RX ADMIN — ATORVASTATIN CALCIUM 10 MILLIGRAM(S): 80 TABLET, FILM COATED ORAL at 21:26

## 2021-07-15 NOTE — CONSULT NOTE ADULT - ASSESSMENT
60 year old female status post bilateral mastectomy with MADISON flap reconstruction    Plan  Monitor bioptic and VS  Labs per surgery  NPO, bedrest and Jimenez until surgery eval in AM  Analgesia/antiemetics PRN  DVT/GI ppx

## 2021-07-15 NOTE — BRIEF OPERATIVE NOTE - NSICDXBRIEFPROCEDURE_GEN_ALL_CORE_FT
PROCEDURES:  Mastectomy, bilateral, nipple sparing, with bilateral sentinel lymph node biopsy 15-Jul-2021 11:28:50 and left axillary lymph node dissection Josie Montejo

## 2021-07-15 NOTE — CONSULT NOTE ADULT - SUBJECTIVE AND OBJECTIVE BOX
Source:  Chart and provider  Reliability:  Good    HPI  60 year old female with PMH dyslipidemia, GERD and left breast cancer now status post bilateral nipple sparing mastectomy, sentinel lymph node biopsy with bilateral MADISON flap Mastectomy, bilateral, nipple sparing, with bilateral sentinel lymph node biopsy.  Offers no complaints at this time    PMH  Dyslipidemia  Obesity  Breast cancer, left  GERD (gastroesophageal reflux disease)    PSH  History of abdominal hernia  02/2004 History of augmentation of both breasts  Endoscopic balloon procedure;  05/2018    Allergies  No known allergies or intolerances    Current Medications  acetaminophen  IVPB .. 1000 milliGRAM(s) IV Intermittent every 6 hours  atorvastatin 10 milliGRAM(s) Oral at bedtime  ketorolac   Injectable 15 milliGRAM(s) IV Push every 6 hours  lactated ringers. 1000 milliLiter(s) (75 mL/Hr) IV Continuous <Continuous>  pantoprazole    Tablet 40 milliGRAM(s) Oral before breakfast  HYDROmorphone  Injectable 0.5 milliGRAM(s) IV Push every 10 minutes PRN Moderate Pain (4 - 6)  HYDROmorphone  Injectable 1 milliGRAM(s) IV Push every 10 minutes PRN Severe Pain (7 - 10)  ondansetron Injectable 4 milliGRAM(s) IV Push once PRN Nausea and/or Vomiting    Psoc  · Marital Status	  · Occupation	  · Lives With	significant other      Alcohol Use History:  · Have you ever consumed alcohol	yes...  · Alcohol Type	wine  · Duration of Alcohol Use (mo/yr)	8 years  · Alcohol Frequency	2-4 times/mo     Tobacco Usage:  · Tobacco Usage: Former smoker  · Tobacco Type: cigarettes  · Number of Packs per Day: 0.5  · Number of yrs: 30  · Pack yrs: 15  · Longest Period Tobacco-Free: 15 year    Pfam  FAMILY HISTORY:  FH: lung cancer  mother    Review of Systems  CONSTITUTIONAL: Denies fever, weight loss, or fatigue  ENT: Denies dysphagia, difficulty chewing, tinnitus, blurred vision, double vision  RESPIRATORY: Denies cough, wheeze, hemoptysis, shortness of breath  CARDIOVASCULAR: Denies chest pain, palpitations irregular HR  GASTROINTESTINAL:  Denies nausea, vomiting, abdominal pain, diarrhea, constipation, melena, BRBPR, food intolerances  GENITOURINARY: Denies dysuria, hematuria, urinary frequency, urinary incontinence  NEUROLOGICAL: Denies headaches, syncope, near syncope, dizziness, lightheadedness, headaches, seizures  SKIN: Denies rashes, masses, bruising, lesions   MUSCULOSKELETAL: Denies history of OA or gout, joint swelling  PSYCHIATRIC: Denies depression, anxiety, mood swings, or difficulty sleeping  HEME: Denies history of DVT/PE, blood transfusion    No post operative labs or imaging requested by surgical team    Vital Signs Last 24 Hrs  T(C): 37.6 (15 Jul 2021 14:50), Max: 37.6 (15 Jul 2021 14:50)  T(F): 99.7 (15 Jul 2021 14:50), Max: 99.7 (15 Jul 2021 14:50)  HR: 82 (15 Jul 2021 16:50) (75 - 95)  BP: 95/62 (15 Jul 2021 16:35) (84/57 - 126/78)  BP(mean): 76 (15 Jul 2021 16:35) (66 - 79)  RR: 16 (15 Jul 2021 16:50) (13 - 18)  SpO2: 99%2L NC  (15 Jul 2021 16:50) (96% - 99%)    Physical Exam  Gen:  WN/WD Female resting in bed, NAD  ENT:  NC/AT, no JVD noted  Lung:  CTA b/l  CV:  S1, S2. RRR  Extrem:  No C/C/E, DP/radial pulses +2  Neuro:  A&O x 3, no gross motor/sensory deficits

## 2021-07-15 NOTE — PROGRESS NOTE ADULT - SUBJECTIVE AND OBJECTIVE BOX
ICU Progress Note    HPI:    S:    Pt seen and examined  HD #  Pt here for      Allergies    No Known Allergies    Intolerances        MEDICATIONS  (STANDING):  acetaminophen   Tablet .. 650 milliGRAM(s) Oral every 6 hours  acetaminophen  IVPB .. 1000 milliGRAM(s) IV Intermittent every 6 hours  atorvastatin 10 milliGRAM(s) Oral at bedtime  ceFAZolin   IVPB 2000 milliGRAM(s) IV Intermittent every 8 hours  ketorolac   Injectable 15 milliGRAM(s) IV Push every 6 hours  lactated ringers. 1000 milliLiter(s) (75 mL/Hr) IV Continuous <Continuous>  pantoprazole    Tablet 40 milliGRAM(s) Oral before breakfast  senna 2 Tablet(s) Oral at bedtime    MEDICATIONS  (PRN):  HYDROmorphone  Injectable 0.5 milliGRAM(s) IV Push every 4 hours PRN Severe Pain (7 - 10)  ondansetron Injectable 4 milliGRAM(s) IV Push every 6 hours PRN Nausea and/or Vomiting  oxyCODONE    IR 5 milliGRAM(s) Oral every 4 hours PRN Moderate Pain (4 - 6)      Drug Dosing Weight  Height (cm): 153.7 (15 Jul 2021 05:23)  Weight (kg): 71 (15 Jul 2021 05:23)  BMI (kg/m2): 30.1 (15 Jul 2021 05:23)  BSA (m2): 1.69 (15 Jul 2021 05:23)    PAST MEDICAL & SURGICAL HISTORY:  Dyslipidemia    Obesity    Breast cancer, left    GERD (gastroesophageal reflux disease)    History of abdominal hernia    History of augmentation of both breasts  02/2004    History of obesity  Endoscopic balloon procedure;  05/2018        FAMILY HISTORY:  FH: lung cancer  mother        ROS: See HPI; otherwise, all systems reviewed and negative.    O:    ICU Vital Signs Last 24 Hrs  T(C): 36.7 (15 Jul 2021 19:15), Max: 37.6 (15 Jul 2021 14:50)  T(F): 98.1 (15 Jul 2021 19:15), Max: 99.7 (15 Jul 2021 14:50)  HR: 88 (15 Jul 2021 20:00) (72 - 95)  BP: 93/61 (15 Jul 2021 20:00) (84/57 - 126/78)  BP(mean): 68 (15 Jul 2021 20:00) (66 - 79)  ABP: --  ABP(mean): --  RR: 19 (15 Jul 2021 20:00) (13 - 19)  SpO2: 99% (15 Jul 2021 20:00) (96% - 100%)          I&O's Detail    15 Jul 2021 07:01  -  15 Jul 2021 22:31  --------------------------------------------------------  IN:    Lactated Ringers: 225 mL    Lactated Ringers: 375 mL  Total IN: 600 mL    OUT:    Bulb (mL): 3 mL    Bulb (mL): 15 mL    Bulb (mL): 30 mL    Bulb (mL): 10 mL    Bulb (mL): 35 mL    Indwelling Catheter - Urethral (mL): 305 mL  Total OUT: 398 mL    Total NET: 202 mL              PE:    Constitutional: Healthy M lying in bed in NAD.   Neck: No JVD, trachea midline.   Respiratory: CTA B/L good BS B/L no W/R/R.  Cardiovascular: S1S2+ RRR no M/R/G.  Gastrointestinal: Soft, NTND.  Extremities: No peripheral edema, No cyanosis, clubbing.  Neurological: Awake, conversive, alert, no gross deficits.  Skin: No rashes, warm, moist.    LABS:                CAPILLARY BLOOD GLUCOSE                  A:    60yFemale  HD #    Here for:    1.    P:    Neuro: GCS 15. Monitor for delirium.  Continue to optimize pain control. Serial Neurologic assessments.    HEENT: No issues.    CV: Continue hemodynamic monitoring    Pulm: Pulmonary toilet.  Continue incentive spirometer.  Chest PT.  Encourage OOB to chair and ambulation. Nebs. f/u ABG, CXR.    GI/Nutrition: Cont diet, bowel regimen.    /Renal: Monitor UOP. Monitor BMP.  Replete Lytes as needed.    HEME- Chemical and mechanical DVT ppx. f/u CBC. f/u coags as needed.    ID:  Cont abx. f/u Cx's.    Lines/Tubes:     Endo: Maintain euglycemia.    Skin:  Cont skin care, pressure ulcer prevention.    Dispo: Cont care.       ICU Progress Note    S: POD 0 from MADISON flap reconstruction. Doing well. O2 saturations much higher than immediately post op. Patient feels well with no complaints. Tolerating ice chips.     Pt seen and examined  HD # 0  Pt here for MADISON      Allergies    No Known Allergies    Intolerances        MEDICATIONS  (STANDING):  acetaminophen   Tablet .. 650 milliGRAM(s) Oral every 6 hours  acetaminophen  IVPB .. 1000 milliGRAM(s) IV Intermittent every 6 hours  atorvastatin 10 milliGRAM(s) Oral at bedtime  ceFAZolin   IVPB 2000 milliGRAM(s) IV Intermittent every 8 hours  ketorolac   Injectable 15 milliGRAM(s) IV Push every 6 hours  lactated ringers. 1000 milliLiter(s) (75 mL/Hr) IV Continuous <Continuous>  pantoprazole    Tablet 40 milliGRAM(s) Oral before breakfast  senna 2 Tablet(s) Oral at bedtime    MEDICATIONS  (PRN):  HYDROmorphone  Injectable 0.5 milliGRAM(s) IV Push every 4 hours PRN Severe Pain (7 - 10)  ondansetron Injectable 4 milliGRAM(s) IV Push every 6 hours PRN Nausea and/or Vomiting  oxyCODONE    IR 5 milliGRAM(s) Oral every 4 hours PRN Moderate Pain (4 - 6)      Drug Dosing Weight  Height (cm): 153.7 (15 Jul 2021 05:23)  Weight (kg): 71 (15 Jul 2021 05:23)  BMI (kg/m2): 30.1 (15 Jul 2021 05:23)  BSA (m2): 1.69 (15 Jul 2021 05:23)    PAST MEDICAL & SURGICAL HISTORY:  Dyslipidemia    Obesity    Breast cancer, left    GERD (gastroesophageal reflux disease)    History of abdominal hernia    History of augmentation of both breasts  02/2004    History of obesity  Endoscopic balloon procedure;  05/2018        FAMILY HISTORY:  FH: lung cancer  mother        ROS: See HPI; otherwise, all systems reviewed and negative.    O:    ICU Vital Signs Last 24 Hrs  T(C): 36.7 (15 Jul 2021 19:15), Max: 37.6 (15 Jul 2021 14:50)  T(F): 98.1 (15 Jul 2021 19:15), Max: 99.7 (15 Jul 2021 14:50)  HR: 88 (15 Jul 2021 20:00) (72 - 95)  BP: 93/61 (15 Jul 2021 20:00) (84/57 - 126/78)  BP(mean): 68 (15 Jul 2021 20:00) (66 - 79)  ABP: --  ABP(mean): --  RR: 19 (15 Jul 2021 20:00) (13 - 19)  SpO2: 99% (15 Jul 2021 20:00) (96% - 100%)          I&O's Detail    15 Jul 2021 07:01  -  15 Jul 2021 22:31  --------------------------------------------------------  IN:    Lactated Ringers: 225 mL    Lactated Ringers: 375 mL  Total IN: 600 mL    OUT:    Bulb (mL): 3 mL    Bulb (mL): 15 mL    Bulb (mL): 30 mL    Bulb (mL): 10 mL    Bulb (mL): 35 mL    Indwelling Catheter - Urethral (mL): 305 mL  Total OUT: 398 mL    Total NET: 202 mL              PE:    Constitutional: Healthy F lying in bed in NAD.   Neck: No JVD, trachea midline.   Respiratory: CTA B/L good BS B/L no W/R/R.  Cardiovascular: S1S2+ RRR no M/R/G.  Gastrointestinal: Soft, NTND.  Extremities: No peripheral edema, No cyanosis, clubbing.  Neurological: Awake, conversive, alert, no gross deficits.  Skin: No rashes, warm, moist.  Surgical Sites: B/l breast incisions c/d/i. Lower abdomen c/d/I. Drains functioning.     LABS:      CAPILLARY BLOOD GLUCOSE    A:    60yFemale  HD #    Here for:    1. Breast CA s/p MADISON    P:    Patient doing very well. Cont current pain management regimen.   Advance diet as tolerated.  Resume home statin for HDL  PPI  DVT ppx  1:1 nursing for breast tissue oxygenation monitoring

## 2021-07-16 ENCOUNTER — TRANSCRIPTION ENCOUNTER (OUTPATIENT)
Age: 61
End: 2021-07-16

## 2021-07-16 LAB
ANION GAP SERPL CALC-SCNC: 8 MMOL/L — SIGNIFICANT CHANGE UP (ref 5–17)
BUN SERPL-MCNC: 11 MG/DL — SIGNIFICANT CHANGE UP (ref 7–23)
CALCIUM SERPL-MCNC: 8 MG/DL — LOW (ref 8.4–10.5)
CHLORIDE SERPL-SCNC: 106 MMOL/L — SIGNIFICANT CHANGE UP (ref 96–108)
CO2 SERPL-SCNC: 28 MMOL/L — SIGNIFICANT CHANGE UP (ref 22–31)
COVID-19 SPIKE DOMAIN AB INTERP: POSITIVE
COVID-19 SPIKE DOMAIN ANTIBODY RESULT: >250 U/ML — HIGH
CREAT SERPL-MCNC: 0.76 MG/DL — SIGNIFICANT CHANGE UP (ref 0.5–1.3)
GLUCOSE SERPL-MCNC: 126 MG/DL — HIGH (ref 70–99)
HCT VFR BLD CALC: 24.6 % — LOW (ref 34.5–45)
HGB BLD-MCNC: 8 G/DL — LOW (ref 11.5–15.5)
MAGNESIUM SERPL-MCNC: 1.2 MG/DL — LOW (ref 1.6–2.6)
MCHC RBC-ENTMCNC: 31.5 PG — SIGNIFICANT CHANGE UP (ref 27–34)
MCHC RBC-ENTMCNC: 32.5 GM/DL — SIGNIFICANT CHANGE UP (ref 32–36)
MCV RBC AUTO: 96.9 FL — SIGNIFICANT CHANGE UP (ref 80–100)
NRBC # BLD: 0 /100 WBCS — SIGNIFICANT CHANGE UP (ref 0–0)
PHOSPHATE SERPL-MCNC: 4.8 MG/DL — HIGH (ref 2.5–4.5)
PLATELET # BLD AUTO: 173 K/UL — SIGNIFICANT CHANGE UP (ref 150–400)
POTASSIUM SERPL-MCNC: 3.8 MMOL/L — SIGNIFICANT CHANGE UP (ref 3.5–5.3)
POTASSIUM SERPL-SCNC: 3.8 MMOL/L — SIGNIFICANT CHANGE UP (ref 3.5–5.3)
RBC # BLD: 2.54 M/UL — LOW (ref 3.8–5.2)
RBC # FLD: 13.5 % — SIGNIFICANT CHANGE UP (ref 10.3–14.5)
SARS-COV-2 IGG+IGM SERPL QL IA: >250 U/ML — HIGH
SARS-COV-2 IGG+IGM SERPL QL IA: POSITIVE
SODIUM SERPL-SCNC: 142 MMOL/L — SIGNIFICANT CHANGE UP (ref 135–145)
WBC # BLD: 7.6 K/UL — SIGNIFICANT CHANGE UP (ref 3.8–10.5)
WBC # FLD AUTO: 7.6 K/UL — SIGNIFICANT CHANGE UP (ref 3.8–10.5)

## 2021-07-16 RX ADMIN — ENOXAPARIN SODIUM 40 MILLIGRAM(S): 100 INJECTION SUBCUTANEOUS at 11:17

## 2021-07-16 RX ADMIN — Medication 15 MILLIGRAM(S): at 01:30

## 2021-07-16 RX ADMIN — Medication 15 MILLIGRAM(S): at 22:45

## 2021-07-16 RX ADMIN — ONDANSETRON 4 MILLIGRAM(S): 8 TABLET, FILM COATED ORAL at 07:06

## 2021-07-16 RX ADMIN — Medication 15 MILLIGRAM(S): at 06:00

## 2021-07-16 RX ADMIN — OXYCODONE HYDROCHLORIDE 5 MILLIGRAM(S): 5 TABLET ORAL at 22:45

## 2021-07-16 RX ADMIN — OXYCODONE HYDROCHLORIDE 5 MILLIGRAM(S): 5 TABLET ORAL at 21:44

## 2021-07-16 RX ADMIN — Medication 15 MILLIGRAM(S): at 00:17

## 2021-07-16 RX ADMIN — ATORVASTATIN CALCIUM 10 MILLIGRAM(S): 80 TABLET, FILM COATED ORAL at 21:44

## 2021-07-16 RX ADMIN — Medication 100 MILLIGRAM(S): at 06:00

## 2021-07-16 RX ADMIN — Medication 15 MILLIGRAM(S): at 15:48

## 2021-07-16 RX ADMIN — Medication 400 MILLIGRAM(S): at 11:14

## 2021-07-16 RX ADMIN — SODIUM CHLORIDE 75 MILLILITER(S): 9 INJECTION, SOLUTION INTRAVENOUS at 04:45

## 2021-07-16 RX ADMIN — Medication 1000 MILLIGRAM(S): at 06:46

## 2021-07-16 RX ADMIN — OXYCODONE HYDROCHLORIDE 5 MILLIGRAM(S): 5 TABLET ORAL at 07:00

## 2021-07-16 RX ADMIN — Medication 400 MILLIGRAM(S): at 06:00

## 2021-07-16 RX ADMIN — Medication 100 MILLIGRAM(S): at 14:57

## 2021-07-16 RX ADMIN — Medication 15 MILLIGRAM(S): at 22:30

## 2021-07-16 RX ADMIN — Medication 15 MILLIGRAM(S): at 06:46

## 2021-07-16 RX ADMIN — SENNA PLUS 2 TABLET(S): 8.6 TABLET ORAL at 21:44

## 2021-07-16 RX ADMIN — Medication 1000 MILLIGRAM(S): at 12:00

## 2021-07-16 RX ADMIN — OXYCODONE HYDROCHLORIDE 5 MILLIGRAM(S): 5 TABLET ORAL at 06:45

## 2021-07-16 RX ADMIN — PANTOPRAZOLE SODIUM 40 MILLIGRAM(S): 20 TABLET, DELAYED RELEASE ORAL at 05:43

## 2021-07-16 NOTE — PROGRESS NOTE ADULT - SUBJECTIVE AND OBJECTIVE BOX
HPI:  This is 60 year old female with history of breast cancer in left breast.  Patient was diagnosed with breast cancer 8/2020. Last date of chemo 3/10/21. HER 2 and Prejeta. Patient has history of GERD. POD#1 from b/l Mastectomy, with MADISON/free flap placement, as well as VIOPTIX tissue/flap oxygenation monitoring.     Current Vioptix:  Right Breast: 77% 99 signal quality  Left Breast: 74% 88 signal quality     Interval Hx:  - Patient OOB today  - Doing well  - No complaints     PAST MEDICAL & SURGICAL HISTORY:  Dyslipidemia  Obesity  Breast cancer, left  GERD (gastroesophageal reflux disease)  History of abdominal hernia  History of augmentation of both breasts  02/2004  History of obesity  Endoscopic balloon procedure;  05/2018    MEDICATIONS  (STANDING):  acetaminophen   Tablet .. 650 milliGRAM(s) Oral every 6 hours  atorvastatin 10 milliGRAM(s) Oral at bedtime  enoxaparin Injectable 40 milliGRAM(s) SubCutaneous daily  ketorolac   Injectable 15 milliGRAM(s) IV Push every 6 hours  lactated ringers. 1000 milliLiter(s) (75 mL/Hr) IV Continuous <Continuous>  pantoprazole    Tablet 40 milliGRAM(s) Oral before breakfast  senna 2 Tablet(s) Oral at bedtime  MEDICATIONS  (PRN):  HYDROmorphone  Injectable 0.5 milliGRAM(s) IV Push every 4 hours PRN Severe Pain (7 - 10)  ondansetron Injectable 4 milliGRAM(s) IV Push every 6 hours PRN Nausea and/or Vomiting  oxyCODONE    IR 5 milliGRAM(s) Oral every 4 hours PRN Moderate Pain (4 - 6)    Review of Systems:  CONSTITUTIONAL: No fever, chills, or fatigue  EYES: No eye pain, visual disturbances, or discharge  ENMT:  No difficulty hearing, tinnitus, vertigo; No sinus or throat pain  NECK: No pain or stiffness  RESPIRATORY: No cough, wheezing, chills or hemoptysis; No shortness of breath  CARDIOVASCULAR: No chest pain, palpitations, dizziness, or leg swelling  GASTROINTESTINAL: No abdominal or epigastric pain. No nausea, vomiting, or hematemesis; No diarrhea or constipation. No melena or hematochezia.  GENITOURINARY: No dysuria, frequency, hematuria, or incontinence  NEUROLOGICAL: No headaches, memory loss, loss of strength, numbness, or tremors  SKIN: No itching, burning, rashes, or lesions   MUSCULOSKELETAL: No joint pain or swelling; No muscle, back, or extremity pain  PSYCHIATRIC: No depression, anxiety, mood swings, or difficulty sleeping    ICU Vital Signs Last 24 Hrs  T(C): 36.4 (16 Jul 2021 07:59), Max: 36.8 (16 Jul 2021 01:00)  T(F): 97.6 (16 Jul 2021 07:59), Max: 98.2 (16 Jul 2021 01:00)  HR: 88 (16 Jul 2021 14:13) (78 - 97)  BP: 96/48 (16 Jul 2021 14:13) (90/53 - 102/54)  BP(mean): 77 (16 Jul 2021 06:00) (62 - 77)  ABP: --  ABP(mean): --  RR: 16 (16 Jul 2021 14:13) (14 - 19)  SpO2: 95% (16 Jul 2021 14:13) (95% - 100%)               8.0    7.60  )-----------( 173      ( 16 Jul 2021 04:45 )             24.6     07-16    142  |  106  |  11  ----------------------------<  126<H>  3.8   |  28  |  0.76    Ca    8.0<L>      16 Jul 2021 04:45  Phos  4.8     07-16  Mg     1.2     07-16      Physical Examination:    General: No acute distress.  Alert, oriented, interactive, nonfocal    BREAST: breasts appears symmetrical, no signs of hematoma, soft to palpation, non tender, well perfused, turgor maintained,cap refill adequate. Viotpix in place over b/l nipples functioning well. VANDANA drains in place with serosanguinous drainage.     HEENT: Pupils equal, reactive to light.  Symmetric.    PULM: Clear to auscultation bilaterally, no significant sputum production    CVS: Regular rate and rhythm, no murmurs, rubs, or gallops    ABD: Soft, nondistended, nontender, normoactive bowel sounds, no masses. Flap removal site well approximated, staples in palce. No ative drainage, bleeding, or signs of infection.    EXT: No edema, nontender    SKIN: Warm and well perfused, no rashes noted.

## 2021-07-16 NOTE — PROGRESS NOTE ADULT - ASSESSMENT
ASSESSMENT:  1. MADISON POD#1        PLAN:  - Jimenez discontinued this AM, waiting for patient to void  - Vioptix in place with stable readings  - Continue pain control with Dilaudid, Oxycodone, Toradol, Tylenol  - Surgical team managing and will be made aware should any issues arise  - Monitor drain output  - PPX: Lovenox and Protonix  - GOC: Full CODE   ASSESSMENT:  1. Left breast cancer s/p MADISON POD#1        PLAN:  - Jimenez discontinued this AM, waiting for patient to void  - Vioptix in place with stable readings  - Continue pain control with Dilaudid, Oxycodone, Toradol, Tylenol  - Surgical team managing and will be made aware should any issues arise  - Monitor drain output  - PPX: Lovenox and Protonix  - GOC: Full CODE

## 2021-07-16 NOTE — PROGRESS NOTE ADULT - ASSESSMENT
s/p bilateral MADISON, POD 1  plan:  OOB to chair  regular diet  cont DVT chemoppx  wean Oxygen  d/c telemetry  d/c naidu  cont vioptix

## 2021-07-16 NOTE — PROGRESS NOTE ADULT - ASSESSMENT
Assessment:  his is 60 year old female with history of breast cancer in left breast.  Patient was diagnosed with breast cancer 8/2020. Last date of chemo 3/10/21. HER 2 and Prejeta. Patient has history of GERD. POD#1 from b/l Mastectomy, with MADISON/free flap placement, as well as VIOPTIX tissue/flap oxygenation monitoring.     Problem List:  1. Breast cancer, left breast     Plan:  - Q1H Vioptix tissue oxygenation monitoring  - Serial flap assessment for signs of perfusion  - Skin assessment for color, firmness, signs of hematoma or other changes  - Abdominal incisions site monitoring  - Hourly checks on VANDANA drain output  - Pain control  - OOB to chair as tolerated  - Discussed case w/primary team  - Any and all changes or concerns regarding MADISON procedure, flap, etc will be immediately addressed with primary surgical team

## 2021-07-16 NOTE — PROGRESS NOTE ADULT - SUBJECTIVE AND OBJECTIVE BOX
doing well  no cp, no sob, no fevers, no calf pain  on exam, both breasts soft. incisions intact. no mastectomy skin flap ischemia  MADISON flap viable with good color and cap refill. stable vioptix  abdomen soft. incisions intact  umbilicus viable

## 2021-07-16 NOTE — DISCHARGE NOTE NURSING/CASE MANAGEMENT/SOCIAL WORK - PATIENT PORTAL LINK FT
You can access the FollowMyHealth Patient Portal offered by [facility] by registering at the following website: [email or web address]. By joining [employer]’s FollowMyHealth portal, you will also be able to view your health information using other applications (apps) compatible with our system.

## 2021-07-16 NOTE — PROGRESS NOTE ADULT - SUBJECTIVE AND OBJECTIVE BOX
CC: Patient is a 60y old  Female who presents with a chief complaint of s/p MADISON (15 Jul 2021 22:30)      S: No f/c/n/v/pain. No events overnight    Patient seen and examined at bedside.    ALLERGIES:  No Known Allergies      MEDICATIONS:  atorvastatin 10 milliGRAM(s) Oral at bedtime  pantoprazole    Tablet 40 milliGRAM(s) Oral before breakfast        Vital Signs Last 24 Hrs  T(F): 97.6 (16 Jul 2021 07:59), Max: 99.7 (15 Jul 2021 14:50)  HR: 97 (16 Jul 2021 07:59) (72 - 97)  BP: 96/64 (16 Jul 2021 07:39) (84/57 - 102/66)  RR: 16 (16 Jul 2021 07:39) (13 - 19)  SpO2: 97% (16 Jul 2021 07:39) (96% - 100%)  I&O's Summary    15 Jul 2021 07:01  -  16 Jul 2021 07:00  --------------------------------------------------------  IN: 1350 mL / OUT: 863 mL / NET: 487 mL    16 Jul 2021 07:01  -  16 Jul 2021 11:20  --------------------------------------------------------  IN: 0 mL / OUT: 140 mL / NET: -140 mL        PHYSICAL EXAM:  General: NAD  ENT: MMM  Neck: Supple, No JVD  Lungs: Clear to auscultation bilaterally  Cardio: RRR, S1/S2, No murmurs  Chest: MADISON flaps soft and well perfused with monitoring devices in place, TTP, drains with expect output  Abdomen: Soft, Nontender, Nondistended; Bowel sounds present  Extremities: No cyanosis, No edema  Neuro: no new deficits  Skin: no rashes  Psych: AAO    LABS:                        8.0    7.60  )-----------( 173      ( 16 Jul 2021 04:45 )             24.6     07-16    142  |  106  |  11  ----------------------------<  126  3.8   |  28  |  0.76    Ca    8.0      16 Jul 2021 04:45  Phos  4.8     07-16  Mg     1.2     07-16      eGFR if Non African American: 84 mL/min/1.73M2 (07-16-21 @ 04:45)  eGFR if African American: 97 mL/min/1.73M2 (07-16-21 @ 04:45)                                      RADIOLOGY & ADDITIONAL TESTS:    Care Discussed with Consultants/Other Providers:

## 2021-07-17 ENCOUNTER — TRANSCRIPTION ENCOUNTER (OUTPATIENT)
Age: 61
End: 2021-07-17

## 2021-07-17 VITALS
SYSTOLIC BLOOD PRESSURE: 109 MMHG | OXYGEN SATURATION: 95 % | TEMPERATURE: 100 F | HEART RATE: 110 BPM | DIASTOLIC BLOOD PRESSURE: 65 MMHG | RESPIRATION RATE: 15 BRPM

## 2021-07-17 PROCEDURE — 76098 X-RAY EXAM SURGICAL SPECIMEN: CPT

## 2021-07-17 PROCEDURE — A9541: CPT

## 2021-07-17 PROCEDURE — 88334 PATH CONSLTJ SURG CYTO XM EA: CPT

## 2021-07-17 PROCEDURE — 80048 BASIC METABOLIC PNL TOTAL CA: CPT

## 2021-07-17 PROCEDURE — 88360 TUMOR IMMUNOHISTOCHEM/MANUAL: CPT

## 2021-07-17 PROCEDURE — 83735 ASSAY OF MAGNESIUM: CPT

## 2021-07-17 PROCEDURE — 84100 ASSAY OF PHOSPHORUS: CPT

## 2021-07-17 PROCEDURE — C1889: CPT

## 2021-07-17 PROCEDURE — 88333 PATH CONSLTJ SURG CYTO XM 1: CPT

## 2021-07-17 PROCEDURE — 88307 TISSUE EXAM BY PATHOLOGIST: CPT

## 2021-07-17 PROCEDURE — 85027 COMPLETE CBC AUTOMATED: CPT

## 2021-07-17 PROCEDURE — 88305 TISSUE EXAM BY PATHOLOGIST: CPT

## 2021-07-17 PROCEDURE — 86769 SARS-COV-2 COVID-19 ANTIBODY: CPT

## 2021-07-17 PROCEDURE — 36415 COLL VENOUS BLD VENIPUNCTURE: CPT

## 2021-07-17 RX ORDER — ACETAMINOPHEN 500 MG
2 TABLET ORAL
Qty: 0 | Refills: 0 | DISCHARGE
Start: 2021-07-17

## 2021-07-17 RX ORDER — SENNA PLUS 8.6 MG/1
2 TABLET ORAL
Qty: 0 | Refills: 0 | DISCHARGE
Start: 2021-07-17

## 2021-07-17 RX ADMIN — OXYCODONE HYDROCHLORIDE 5 MILLIGRAM(S): 5 TABLET ORAL at 04:20

## 2021-07-17 RX ADMIN — OXYCODONE HYDROCHLORIDE 5 MILLIGRAM(S): 5 TABLET ORAL at 05:00

## 2021-07-17 RX ADMIN — Medication 15 MILLIGRAM(S): at 05:29

## 2021-07-17 RX ADMIN — Medication 15 MILLIGRAM(S): at 11:31

## 2021-07-17 RX ADMIN — OXYCODONE HYDROCHLORIDE 5 MILLIGRAM(S): 5 TABLET ORAL at 19:09

## 2021-07-17 RX ADMIN — PANTOPRAZOLE SODIUM 40 MILLIGRAM(S): 20 TABLET, DELAYED RELEASE ORAL at 05:29

## 2021-07-17 RX ADMIN — OXYCODONE HYDROCHLORIDE 5 MILLIGRAM(S): 5 TABLET ORAL at 18:09

## 2021-07-17 RX ADMIN — Medication 15 MILLIGRAM(S): at 12:01

## 2021-07-17 RX ADMIN — Medication 15 MILLIGRAM(S): at 06:00

## 2021-07-17 RX ADMIN — ENOXAPARIN SODIUM 40 MILLIGRAM(S): 100 INJECTION SUBCUTANEOUS at 11:33

## 2021-07-17 NOTE — DISCHARGE NOTE PROVIDER - NSDCHHHOMEBOUNDOTHER_GEN_ALL_CORE_FT
Patient unable to drive and needs assistance to ambulate and to get dressed.   2/2 to major surgery and many drains

## 2021-07-17 NOTE — DISCHARGE NOTE PROVIDER - NSDCFUSCHEDAPPT_GEN_ALL_CORE_FT
KELSY CRANE ; 07/19/2021 ; PARDEEP [address]  KELSY CRANE ; 08/04/2021 ; PARDEEP PASCUAL [facility]  KELSY CRANE ; 08/04/2021 ; PARDEEP [facility]

## 2021-07-17 NOTE — DISCHARGE NOTE PROVIDER - NSDCFUADDINST_GEN_ALL_CORE_FT
Keep  all dressings dry and intact until further post op instructions on July 19  Empty and record drainage from drains twice a day '  or as needed .   Sleep on back - use wedge for comfort and elevation   Elevate head when resting approx 30 degrees  Take Medications as prescribed by Dr Sow office   Oxycodone for moderate to severe pain, Tylenol and Toradol for mild to moderate pain - alternate as needed.    Zofran for nausea and vomiting as needed   Patient has these medications at home   Ambulate as tolerated   Rest is also needed    Keep  all dressings dry and intact until further post op instructions on July 19  Empty and record drainage from drains twice a day '  or as needed .   Sleep on back - use wedge for comfort and elevation   Elevate head when resting approx 30 degrees  Take Medications as prescribed by Dr Sow office   Oxycodone for moderate to severe pain, Tylenol and Toradol for mild to moderate pain - alternate as needed.    Zofran for nausea and vomiting as needed   Patient has these medications at home   Ambulate as tolerated   Rest is also needed   Drink plenty of water   Take a laxative if no BM in 48 hours

## 2021-07-17 NOTE — DISCHARGE NOTE PROVIDER - NSDCCPCAREPLAN_GEN_ALL_CORE_FT
PRINCIPAL DISCHARGE DIAGNOSIS  Diagnosis: Breast cancer, left  Assessment and Plan of Treatment: for bilateral skin sparing mastectomies./bilateral axillary sentinel lymph nodes, /MADISON flap reconstruction

## 2021-07-17 NOTE — PROGRESS NOTE ADULT - SUBJECTIVE AND OBJECTIVE BOX
doing well  no cp, no sob, no fevers, no calf pain  on exam, the breasts are soft. the vioptix are stable. the MADISON flap are viable with good color and cap refill  abdomen is soft. incisions intact.

## 2021-07-17 NOTE — PROGRESS NOTE ADULT - ATTENDING COMMENTS
Assessment:  1. Left breast cancer - s/p bilateral MADISON    Plan  - Pain control  - Surgery follow up  - Monitor drain output   - OOB to chair   - Discharge planning to home
Assessment:  1. Left breast cancer - s/p bilateral MADISON    Plan  - Pain control  - Monitor on vioptix   - Surgery follow up  - Monitor drain output   - OOB to chair   - D/c elysia   - Cont. care in patient for now

## 2021-07-17 NOTE — DISCHARGE NOTE PROVIDER - NSDCCAREPROVSEEN_GEN_ALL_CORE_FT
Abida, Juan Daniel Ralph, Jarad Ann, Vanita Bates, Arvind Rankin, Husam Montejo, Josie Campbell, Jarad FIELDS

## 2021-07-17 NOTE — PROGRESS NOTE ADULT - SUBJECTIVE AND OBJECTIVE BOX
CC: Patient is a 60y old  Female who presents with a chief complaint of MADISON (16 Jul 2021 11:20)      S: Mild pain as expected overnight, controlled by pain medications. Has been OOB to chair, tolerating diet, and voiding spontaneously. No BM yet. B    Patient seen and examined at bedside.    ALLERGIES:  No Known Allergies      MEDICATIONS:  atorvastatin 10 milliGRAM(s) Oral at bedtime  pantoprazole    Tablet 40 milliGRAM(s) Oral before breakfast        Vital Signs Last 24 Hrs  T(F): 98.4 (17 Jul 2021 07:52), Max: 99.2 (16 Jul 2021 20:32)  HR: 103 (17 Jul 2021 07:52) (88 - 103)  BP: 105/63 (17 Jul 2021 07:52) (96/48 - 114/74)  RR: 15 (17 Jul 2021 07:52) (15 - 18)  SpO2: 94% (17 Jul 2021 07:52) (94% - 100%)  I&O's Summary    16 Jul 2021 07:01  -  17 Jul 2021 07:00  --------------------------------------------------------  IN: 975 mL / OUT: 442 mL / NET: 533 mL        PHYSICAL EXAM:  General: NAD  ENT: MMM  Neck: Supple, No JVD  Lungs: Clear to auscultation bilaterally  Cardio: RRR, S1/S2, No murmurs  Chest: MADISON flaps soft and well perfused with monitoring devices in place, TTP, drains with expect output  Abdomen: Soft, Nontender, Nondistended; Bowel sounds present  Extremities: No cyanosis, No edema  Neuro: no new deficits  Skin: no rashes      LABS:                        8.0    7.60  )-----------( 173      ( 16 Jul 2021 04:45 )             24.6     07-16    142  |  106  |  11  ----------------------------<  126  3.8   |  28  |  0.76    Ca    8.0      16 Jul 2021 04:45  Phos  4.8     07-16  Mg     1.2     07-16      eGFR if Non African American: 84 mL/min/1.73M2 (07-16-21 @ 04:45)  eGFR if African American: 97 mL/min/1.73M2 (07-16-21 @ 04:45)                                      RADIOLOGY & ADDITIONAL TESTS:    Care Discussed with Consultants/Other Providers:

## 2021-07-17 NOTE — DISCHARGE NOTE PROVIDER - CARE PROVIDERS DIRECT ADDRESSES
[email or web address]

## 2021-07-17 NOTE — DISCHARGE NOTE PROVIDER - NSDCCPTREATMENT_GEN_ALL_CORE_FT
PRINCIPAL PROCEDURE  Procedure: Bilateral skin sparing mastectomy  Findings and Treatment: bilateral axillary sentinel lymph node bx/ left axillary lymph node dissection /MADISON flap reconstruction

## 2021-07-17 NOTE — PROGRESS NOTE ADULT - ASSESSMENT
ASSESSMENT:  1. Left breast cancer s/p MADISON POD#2        PLAN:  - Discharge today per surgery  - Vioptix in place with stable readings  - Continue pain control with Dilaudid, Oxycodone, Toradol, Tylenol  - Surgical team managing and will be made aware should any issues arise  - Monitor drain output  - PPX: Lovenox and Protonix  - GOC: Full CODE

## 2021-07-17 NOTE — DISCHARGE NOTE PROVIDER - CARE PROVIDER_API CALL
Juan Daniel Tai; MARIANGEL)  Otolaryngology; Plastic Surgery  [address] [place] [address], [facility] [address]  [place] [address]  Phone: [phone]  Fax: [phone]  Scheduled Appointment: 07/19/2021    Husam Rankin)  Surgery  [address] [place] [id number]  Phone: [phone]  Fax: [phone]  Follow Up Time: 2 weeks   Husam Rankin (MD)  Surgery  [address] [place] [id number]  Phone: [phone]  Fax: [phone]  Follow Up Time: 2 weeks    Tyrone Levi)  Plastic Surgery  [address] [place] [address]  Phone: [phone]  Fax: [phone]  Scheduled Appointment: 07/19/2021

## 2021-07-17 NOTE — PROGRESS NOTE ADULT - ASSESSMENT
s/p bilateral MADISON, POD 2  plan:  d/c home  patient has prescriptions  sleep on back  no heavy lifting  f/u Monday  empty drains bid  encourage incentive spirometer at home

## 2021-07-17 NOTE — DISCHARGE NOTE PROVIDER - NSDCMRMEDTOKEN_GEN_ALL_CORE_FT
acetaminophen 325 mg oral tablet: 2 tab(s) orally every 6 hours  Herceptin 150 mg intravenous injection:   omeprazole 20 mg oral delayed release capsule: orally once a day  Perjeta 420 mg/14 mL intravenous solution:   senna oral tablet: 2 tab(s) orally once a day (at bedtime)  simvastatin 20 mg oral tablet: 1 tab(s) orally once a day (at bedtime)

## 2021-07-17 NOTE — DISCHARGE NOTE PROVIDER - HOSPITAL COURSE
HPI:  This is 60 year old female with history of breast cancer in left breast.  Patient was diagnosed with breast cancer 8/2020. Last date of chemo 3/10/21. HER 2 and Prejeta. Patient has history of GERD (01 Jul 2021 12:35)  The patient was admitted and underwent the scheduled procedure.   She underwent bilateral skin sparing mastectomies, Bilateral sentinel nodes and Left Axillary Dissection/ MADISON Flap reconstruction.    The patient has   had an uneventful post op course.  Her flaps are viable , color good, soft, vioptix in 70 s bilateral.   Her abdominal incision is clean, dry, umbilicus viable.  all drains patent with appropriate drainage.   The patient was seen by Dr Tai this am and cleared for discharge.  Post op instructions were given and patient will follow up with Dr Tai on Monday .   July 19 HPI:  This is 60 year old female with history of breast cancer in left breast.  Patient was diagnosed with breast cancer 8/2020. Last date of chemo 3/10/21. HER 2 and Prejeta. Patient has history of GERD (01 Jul 2021 12:35)  The patient was admitted and underwent the scheduled procedure.   She underwent bilateral skin sparing mastectomies, Bilateral sentinel nodes and Left Axillary Dissection/ MADISON Flap reconstruction.    The patient has   had an uneventful post op course.  Her flaps are viable , color good, soft, vioptix in 70 s bilateral.   Her abdominal incision is clean, dry, umbilicus viable.  all drains patent with appropriate drainage.   The patient was seen by Dr Tai this am and cleared for discharge.  Post op instructions were given and patient will follow up with Dr Magi LEDBETTER on Monday .   July 19

## 2021-07-17 NOTE — DISCHARGE NOTE PROVIDER - PROVIDER TOKENS
PROVIDER:[TOKEN:[[id number]],SCHEDULEDAPPT:[07/19/2021]],PROVIDER:[TOKEN:[[id number]],FOLLOWUP:[2 weeks]] PROVIDER:[TOKEN:[[id number]],FOLLOWUP:[2 weeks]],PROVIDER:[TOKEN:[[id number]],SCHEDULEDAPPT:[07/19/2021]]

## 2021-07-17 NOTE — DISCHARGE NOTE PROVIDER - NSDCHHNEEDSERVICEOTHER_GEN_ALL_CORE_FT
Patient needs nursing to come in and check 5 drains. Check patency and reinforce care .  Check incisions .

## 2021-07-19 ENCOUNTER — APPOINTMENT (OUTPATIENT)
Dept: MAMMOGRAPHY | Facility: IMAGING CENTER | Age: 61
End: 2021-07-19

## 2021-07-20 NOTE — REASON FOR VISIT
[Follow-Up Visit] : a follow-up [Other: _____] : [unfilled] [FreeTextEntry2] : ER+ 80%,NE+ 50%, HER 2 + breast cancer

## 2021-07-20 NOTE — HISTORY OF PRESENT ILLNESS
[de-identified] : Ms.Joanne Damon is a 60 year old female here for an evaluation of breast cancer. Her oncologic history is as follows:\par \par Patient noticed left breast mass for 4 months and presented to plastic surgeon's office. She was sent for breast imaging on 8/19/2020 BIRADS 5 which showed new irregular mass with calcifications and nipple retraction in the left retroareolar location on mammography, corresponding to a mass in the left breast 6:00 location 2 cm from the nipple on the concurrent breast ultrasound. Ultrasound-guided core biopsy is recommended for diagnosis.\par - Abnormal left axillary lymph nodes on mammography and sonography, including lymph nodes demonstrating microcalcifications. Ultrasound-guided core biopsy of a representative abnormal appearing left axillary lymph node with calcifications is recommended.\par - Faint loosely grouped calcifications in the upper central left breast and the upper central right breast which are indeterminate. Sampling stereotactic biopsy of both breasts is recommended.\par - Loosely grouped predominantly coarse dystrophic calcifications associated with areas of central lucency in the posterior medial right breast which are thought to represent fat necrosis. Six-month follow-up right diagnostic mammography is recommended. She underwent New irregular mass with calcifications and nipple retraction in the left retroareolar location on mammography, corresponding to a mass in the left breast 6:00 location 2 cm from the nipple on the concurrent breast ultrasound. Ultrasound-guided core biopsy is recommended for diagnosis.\par  She had a left, 6 o'clock, 2 cm FN, core biopsy on on 8/28/2020 which showed Invasive poorly differentiated ductal carcinoma with areas of micropapillary features\par - Tomah score 9/9 (3 + 3 + 3)\par - Invasive tumor measures at least 1.0 cm\par - Ductal carcinoma in situ (focal), cribriform pattern with\par high nuclear grade and central necrosis\par \par  Breast, left, axillary lymph node, core biopsy\par - Invasive poorly differentiated ductal carcinoma with mild lymphocytic infiltrate\par ER+ 80%,OH+ 50%, HER 2 + breast cancer\par \par She has been on Prempro x 7 years for hot flashes. She is off now x 2 weeks. Hot flashes are ok\par She had breast augmentation with Dr Geiger. Implants exchanged 2019.  Benign biopsy in 2019\par \par 9/30/2020\par 9/25/2020 ECHO LVEF 57%\par PETCT SKUL[facility] [place] [facility]\"" ONC  09/18/2020 No evidence of distant metastasis.\par EKG 2/2020 faxed by PCP , repeat 9/30/2020 done , Chemo consent obtained \par Right side Medport in place, catheter tip at the distal SVC [de-identified] : Ms. KELSY CRANE  is here for a follow up appt for left breast ca, dx 8/28/2020  started neoadjuvant TCHP with onpro on 9/30/2020.  She tested positive for Covid by PCR on 10/30/2020.  A repeat test at 2 weeks was positive again on 11/13/2020.  She was admitted on 11/15/2020 with port site infection.  She was treated with IV antibiotics and was discharged on Zyvox.  Subsequently Covid PCR was done x2 and was negative.  She received cycle 3 on 12/2/2020. S/p admission 12/20/2020-12/28/2020 for port infection, port removed. s/p HP #4 on 1/7 (TC held) and TCHP 4, HP 5 on 1/27. TC # 6, HP# 7 on 3/10/2021. \par \par She is here for HP # 13\par She saw Dr Rankin and is waiting to meet with Plastic surgery. She also underwent genetic testing. . She is waiting for BRCA gene. Rec to schedule surgery ASAP. She finally decide to schedule surgery tomorrow 7/15/21. She is concerned about getting implants again. She has implants for many years and now hoping to get MADISON.\par Chemo s/e are resolved. Eating good, wt stable. Diarrhea resolved, no neuropathy. Hair is growing back she has a nice short hair style.\par picc line in left arm-  was removed as she wanted to travel to [place] end of june.\par echo 12/2020  No cardio pulm sx.  echo 5/24/21 LVEF 58%\par breast mass decreased in size but still palpable. MRI 4/20210 results d/w her. She has partial response. She will continue HP q3w for now, switch to TDM1 post op if residual disease. \par s/p covid vaccine x 2

## 2021-07-20 NOTE — ASSESSMENT
[Curative] : Goals of care discussed with patient: Curative [FreeTextEntry1] : Ms. KELSY CRANE is a 60 year old female with clinically T2 N1 Mx poorly differentiated invasive ductal carcinoma ER/NV positive and HER-2/lazara POSITIVE. PET/CT 9/2020 ALEXX. Neoadj TCHP started 9/30/2020. \par \par -Breast ca: Ms. KELSY CRANE  started neoadjuvant TCHP with onpro on 9/30/2020.  She tested positive for Covid by PCR on 10/30/2020.  A repeat test at 2 weeks was positive again on 11/13/2020.  She was admitted on 11/15/2020 with port site infection.  She was treated with IV antibiotics and was discharged on Zyvox.  Subsequently Covid PCR was done x2 and was negative.  She received cycle 3 on 12/2/2020. S/p admission 12/20/2020-12/28/2020 for port infection, port removed. s/p HP #4 on 1/7 (TC held) and TCHP 4, HP 4 on 1/27. TCHP #5 2/17/21. TCHP # 6 on 3/10/21. \par - She is here for HP # 13 7/14/21. breast mass shrinking but still palpable. MRI 4/20210 results d/w her. She has partial response. She saw Dr Rankin and Dr. Tyrone Levi plastic surgeon. Genetic testing 4/21 BRCA negative She is scheduled for surgery tomorrow 7/15/21. She is waiting for BRCA gene. Rec to schedule surgery ASAP. She is concerned about getting implants again. She has implants for many years and now hoping to get MADISON. SHe has multiple questions about surgery, healing, reconstruction, timing etc.  All questions were answered and she was advised to discuss further with plastic surgeon. She will continue HP q3w for now, switch to TDM1 post op if residual disease.  Candidate for AI also\par - PICC line: picc line in left arm- was removed\par - At risk for chemo induced cardiomyopathy: Check ECHO Q3m.LAst echo,5/24/21 EF 58%\par - Chemo induced anemia- Grade 1. No transfusion needed. Monitor counts  \par - Mild thrombocytopenia 2/2 chemo: no transfusion needed. monitor\par -Hypomagnesemia- improved  6/23/21 Mag 1.7  \par -situational anxiety-  patient anxious about surgery tomorrow emotional support provided \par Next  tx in 3 weeks ( 8/4/21)\par schedule reviewed\par CHEMO ORDERS AND LABS IN [place]

## 2021-07-20 NOTE — PHYSICAL EXAM
[Fully active, able to carry on all pre-disease performance without restriction] : Status 0 - Fully active, able to carry on all pre-disease performance without restriction [Normal] : affect appropriate [de-identified] : Port removed, picc line cdi [de-identified] : BL breasts with mastopexy scars. Left breast with 3x3 cm hard retroareolar mass. Left nipple flattened/inverted.  Left axillary LN, no longer  palpable,  No SCLn

## 2021-07-21 LAB — SURGICAL PATHOLOGY STUDY: SIGNIFICANT CHANGE UP

## 2021-07-23 ENCOUNTER — APPOINTMENT (OUTPATIENT)
Dept: PLASTIC SURGERY | Facility: CLINIC | Age: 61
End: 2021-07-23
Payer: COMMERCIAL

## 2021-07-23 PROCEDURE — 99024 POSTOP FOLLOW-UP VISIT: CPT

## 2021-07-26 ENCOUNTER — NON-APPOINTMENT (OUTPATIENT)
Age: 61
End: 2021-07-26

## 2021-07-27 ENCOUNTER — APPOINTMENT (OUTPATIENT)
Dept: PLASTIC SURGERY | Facility: CLINIC | Age: 61
End: 2021-07-27
Payer: COMMERCIAL

## 2021-07-27 PROCEDURE — 99024 POSTOP FOLLOW-UP VISIT: CPT

## 2021-07-27 NOTE — HISTORY OF PRESENT ILLNESS
[FreeTextEntry1] : Jazlyn is a 60 year old female who presents for a postop evaluation.  Patient is s/p bilateral breast reconstruction with MADISON flaps on 7/15/21.  She has been healing well and denies any specific complaints or concerns.  She is eating and drinking well, pain well-controlled with Tylenol and has been compliant with upper extremity ROM exercises.  Remaining abdominal drain with < 15 ml over past 24 hours.

## 2021-07-27 NOTE — PHYSICAL EXAM
[de-identified] : alert, calm, cooperative\par  [de-identified] : respirations are even and unlabored\par  [de-identified] : bilateral breast incisions are well-approximated, c/d/i, with Dermabond in place.  There is moderate, generalized edema.  No signs of wound dehiscence or fluctuance. Flap is soft, skin paddle pink with good capillary refill. \par  [de-identified] : transverse abdominal and umbilical incisions are well-approximated with moderate, generalized edema.  Incisions are c/d/i with Prineo in place.  No signs of wound dehiscence or fluctuance.  Right Samir drain is to self-suction with serosanguineous fluid present.\par  [de-identified] : good upper extremity ROM (right > left)

## 2021-08-03 ENCOUNTER — APPOINTMENT (OUTPATIENT)
Dept: PLASTIC SURGERY | Facility: CLINIC | Age: 61
End: 2021-08-03
Payer: COMMERCIAL

## 2021-08-03 VITALS
TEMPERATURE: 98.1 F | DIASTOLIC BLOOD PRESSURE: 74 MMHG | BODY MASS INDEX: 29.07 KG/M2 | HEIGHT: 61 IN | SYSTOLIC BLOOD PRESSURE: 112 MMHG | OXYGEN SATURATION: 97 % | WEIGHT: 154 LBS | HEART RATE: 75 BPM

## 2021-08-03 PROCEDURE — 99024 POSTOP FOLLOW-UP VISIT: CPT

## 2021-08-03 NOTE — PHYSICAL EXAM
[de-identified] : alert, calm, cooperative\par  [de-identified] : respirations are even and unlabored\par  [de-identified] : bilateral breast incisions are well-approximated, c/d/i, with Dermabond.  There is moderate, generalized edema, which has improved.  No signs of wound dehiscence or fluctuance. Flap is soft, skin paddle pink with good capillary refill\par  [de-identified] : transverse abdominal and umbilical incisions are well-approximated with moderate, generalized edema.  Incisions are c/d/i with Prineo in place.  No signs of fluctuance.  Mild, superficial wound dehiscence of the medial aspect of the abdominal incision\par

## 2021-08-03 NOTE — HISTORY OF PRESENT ILLNESS
[FreeTextEntry1] : Jazlyn is a 60 year old female who presents for a postop evaluation. Patient is s/p bilateral breast reconstruction with MADISON flaps on 7/15/21. She has been healing well and denies any specific complaints or concerns. She is eating and drinking well, pain well-controlled with Tylenol and has been compliant with upper extremity ROM exercises. Reports small amount of discharge noted on her underwear from her abdominal incision.

## 2021-08-04 ENCOUNTER — APPOINTMENT (OUTPATIENT)
Dept: SURGICAL ONCOLOGY | Facility: CLINIC | Age: 61
End: 2021-08-04
Payer: COMMERCIAL

## 2021-08-04 ENCOUNTER — APPOINTMENT (OUTPATIENT)
Dept: INFUSION THERAPY | Facility: HOSPITAL | Age: 61
End: 2021-08-04

## 2021-08-04 ENCOUNTER — APPOINTMENT (OUTPATIENT)
Dept: HEMATOLOGY ONCOLOGY | Facility: CLINIC | Age: 61
End: 2021-08-04
Payer: COMMERCIAL

## 2021-08-04 VITALS
SYSTOLIC BLOOD PRESSURE: 91 MMHG | RESPIRATION RATE: 18 BRPM | HEART RATE: 86 BPM | DIASTOLIC BLOOD PRESSURE: 66 MMHG | HEIGHT: 60.98 IN | OXYGEN SATURATION: 97 % | TEMPERATURE: 97.8 F | WEIGHT: 153 LBS | BODY MASS INDEX: 28.89 KG/M2

## 2021-08-04 VITALS
OXYGEN SATURATION: 97 % | DIASTOLIC BLOOD PRESSURE: 77 MMHG | HEART RATE: 83 BPM | BODY MASS INDEX: 28.89 KG/M2 | RESPIRATION RATE: 16 BRPM | SYSTOLIC BLOOD PRESSURE: 116 MMHG | HEIGHT: 61 IN | WEIGHT: 153 LBS

## 2021-08-04 PROCEDURE — 99024 POSTOP FOLLOW-UP VISIT: CPT

## 2021-08-04 PROCEDURE — 99215 OFFICE O/P EST HI 40 MIN: CPT

## 2021-08-04 NOTE — ASSESSMENT
[FreeTextEntry1] : IMP:\par 61 y/o female diagnosed with left breast Invasive poorly differentiated ductal carcinoma and DCIS (ER/AR/HER2+) with mets to left axillary lymph node; s/p neoadjuvant chemotherapy\par Now s/p bilateral total mastectomies, bilateral axillary sentinel lymph node biopsies, left axillary node dissection on 7/15/21.\par Final Pathology revealed:\par 1) right axillary sentinel lymph nodes negative for carcinoma. 0/2\par 2) right breast simple mastectomy- minute focus of ADH. \par 3) left breast simple mastectomy- 3.4 cm invasive ductal carcinoma with focal mucinous and micropapillary features. Dermal lymphovascular invasion present. ADH, negative margins. ER/AR (+) HER2 (+)\par 4) left axilla sentinel lymph biopsy- 3/3 lymph nodes positive for metastatic carcinoma. Extra sherry extension is present. \par 5) right Internal mammary lymph nodes- negative for carcinoma. \par 6)Left Additional axillary contents dissection -11/14 positive for metastatic carcinoma, extranodal extension is present. \par 7) left internal mammary lymph node negative for carcinoma. 0/1\par Tumor stage: pT2 pN3a;  Stage 3 \par MADISON reconstruction - Dr. Levi/Dr. Tai\par No evidence of infection\par \par PLAN:\par Follow up with plastic surgeon\par follow up with Dr Bell (med onc) appointment scheduled for today\par RTO in 6 months, sooner if concerns\par

## 2021-08-04 NOTE — PHYSICAL EXAM
[Normal] : well developed, well nourished, in no acute distress [FreeTextEntry1] : VANDANA present during exam  [de-identified] : s/p bilateral mastectomies with MADISON reconstruction; No evidence of infection

## 2021-08-04 NOTE — REASON FOR VISIT
[Post-Op] : a post-op for [Breast Cancer] : breast cancer [Spouse] : spouse [FreeTextEntry2] : bilateral total mastectomies, bilateral axillary sentinel lymph node biopsies, left axillary node dissection on 7/15/21.

## 2021-08-04 NOTE — HISTORY OF PRESENT ILLNESS
[de-identified] : Ms. Jazlyn Damon is a 59 y/o female presenting for a post op visit s/p bilateral total mastectomies, bilateral axillary sentinel lymph node biopsies, left axillary node dissection on 7/15/21. MADISON reconstruction by Dr. Levi/Dr. Tai. \par Final Pathology revealed:\par 1) right axillary sentinel lymph nodes negative for carcinoma. 0/2\par 2) right breast simple mastectomy- minute focus of ADH. \par 3) left breast simple mastectomy- 3.4 cm invasive ductal carcinoma with focal mucinous and micropapillary features. Dermal lymphovascular invasion present. ADH, negative margins. ER/PA (+) HER2 (+)\par 4) left axilla sentinel lymph biopsy- 3/3 lymph nodes positive for metastatic carcinoma. Extra sherry extension is present. \par 5) right Internal mammary lymph nodes- negative for carcinoma. \par 6)Left Additional axillary contents dissection -11/14 positive for metastatic carcinoma, extranodal extension is present. \par 7) left internal mammary lymph node negative for carcinoma. 0/1\par Tumor stage: pT2 pN3a;  Stage 3 \par \par Denies pain, fever or chills. Doing well. \par \par \par PERTINENT HISTORY:\par She was seen in initial consultation on 9/2020 for newly diagnosed left breast cancer.  The patient reportedly felt a lump in her left breast for 3 to 4 months and presented to plastic surgeons office. She has no personal history of cancer. She has a history of implant(s) removed in 2019 (Dr. Geiger) and left stereotactic guided biopsy in 2019 - benign. She has been on Prempro x 7 years for hot flashes. \par \par MMG/Sono on 8/19/2020: IMPRESSION:\par - New irregular mass with calcifications and nipple retraction in the left retroareolar location on mammography, corresponding to a mass in the left breast 6:00 location 2 cm from the nipple on the concurrent breast ultrasound. Ultrasound-guided core biopsy is recommended for diagnosis.\par - Abnormal left axillary lymph nodes on mammography and sonography, including lymph nodes demonstrating microcalcifications. Ultrasound-guided core biopsy of a representative abnormal appearing left axillary lymph node with calcifications is recommended.\par - Faint loosely grouped calcifications in the upper central left breast and the upper central right breast which are indeterminate. Sampling stereotactic biopsy of both breasts is recommended.\par - Loosely grouped predominantly coarse dystrophic calcifications associated with areas of central lucency in the posterior medial right breast which are thought to represent fat necrosis. Six-month follow-up right diagnostic mammography is recommended.\par BI-RADS 5\par \par 8/28/2020 : FN core biopsy:\par 1. Breast, left, 6 o'clock, 2 cm FN- Invasive poorly differentiated ductal carcinoma with areas of micropapillary features.  Ductal carcinoma in situ (focal), cribriform pattern with high nuclear grade and central necrosis\par - Lymphovascular permeation by tumor not seen\par -ER/PA/HER2 (+)\par \par 2. Breast, left, axillary lymph node, core biopsy\par - Invasive poorly differentiated ductal carcinoma with mild lymphocytic infiltrate\par \par MRI Breast 9/8/2020- Biopsy proven malignancy in the left retroareolar breast with involvement of the nipple areolar complex. Left axillary lymphadenopathy. Nonmass enhancement in the lower outer right breast for which MRI guided biopsy is recommended. BIRADS 4B. \par \par The patient is also s/p MRI guided core needle biopsy of the right breast on 9/16/2020 with the following pathology: \par -Right breast lower outer quadrant MRI guided bx:  Focal Atypical Duct Hyperplasia, Stromal fibrosis and adenosis\par -Results are concordant. \par \par PET 9/18/2020- Hypermetabolic left retroareolar breast mass and left axillary lymph nodes, corresponding to biopsy proven breast carcinoma and axillary lymph node metastases. No evidence of distant metastases. \par \par She had a right mediport placed for chemotherapy on 9/24/2020 Pt. started neoadjuvant TCHP with onpro on 9/30/2020. She tested positive for Covid by PCR on 10/30/2020. A repeat test at 2 weeks was positive again on 11/13/2020. She was admitted on 11/15/2020 with port site infection. She was treated with IV antibiotics and was discharged on Zyvox. Subsequently Covid PCR was done x2 and was negative. She received cycle 3 on 12/2/2020. S/p admission 12/20/2020-12/28/2020 for port infection, port removed. s/p HP #4 on 1/7 (TC held) and TCHP 4, HP 4 on 1/27. TCHP #5 2/17/21. TCHP # 6 on 3/10/21. She continues Herceptin under the care of Dr. Cassie Bell. \par \par [facility] Genetic Testing 4/28/2021-  NEGATIVE \par \par MRI Breast to evaluate response to neoadjuvant chemotherapy performed on 4/10/2021 showed partial response to neoadjuvant chemotherapy.  The left breast mass is slightly decreased in size.  The left axillary lymph nodes have decrease in size, and now demonstrate a normal imaging appearance.  This includes the biopsy proven malignant lymph nodes in the lower left axilla which contains susceptibility artifact from a biopsy marker.  BIRADS 6. \par \par Patient presents to the office today to discuss surgical options.  Doing well. \par \par Referring physician: Dr. Geiger\par Heme-onc: Dr. Cassie Bell

## 2021-08-06 ENCOUNTER — NON-APPOINTMENT (OUTPATIENT)
Age: 61
End: 2021-08-06

## 2021-08-09 ENCOUNTER — NON-APPOINTMENT (OUTPATIENT)
Age: 61
End: 2021-08-09

## 2021-08-09 NOTE — HISTORY OF PRESENT ILLNESS
[de-identified] : Ms.Joanne Damon is a 60 year old female here for an evaluation of breast cancer. Her oncologic history is as follows:\par \par Patient noticed left breast mass for 4 months and presented to plastic surgeon's office. She was sent for breast imaging on 8/19/2020 BIRADS 5 which showed new irregular mass with calcifications and nipple retraction in the left retroareolar location on mammography, corresponding to a mass in the left breast 6:00 location 2 cm from the nipple on the concurrent breast ultrasound. Ultrasound-guided core biopsy is recommended for diagnosis.\par - Abnormal left axillary lymph nodes on mammography and sonography, including lymph nodes demonstrating microcalcifications. Ultrasound-guided core biopsy of a representative abnormal appearing left axillary lymph node with calcifications is recommended.\par - Faint loosely grouped calcifications in the upper central left breast and the upper central right breast which are indeterminate. Sampling stereotactic biopsy of both breasts is recommended.\par - Loosely grouped predominantly coarse dystrophic calcifications associated with areas of central lucency in the posterior medial right breast which are thought to represent fat necrosis. Six-month follow-up right diagnostic mammography is recommended. She underwent New irregular mass with calcifications and nipple retraction in the left retroareolar location on mammography, corresponding to a mass in the left breast 6:00 location 2 cm from the nipple on the concurrent breast ultrasound. Ultrasound-guided core biopsy is recommended for diagnosis.\par  She had a left, 6 o'clock, 2 cm FN, core biopsy on on 8/28/2020 which showed Invasive poorly differentiated ductal carcinoma with areas of micropapillary features\par - [place] score 9/9 (3 + 3 + 3)\par - Invasive tumor measures at least 1.0 cm\par - Ductal carcinoma in situ (focal), cribriform pattern with\par high nuclear grade and central necrosis\par \par  Breast, left, axillary lymph node, core biopsy\par - Invasive poorly differentiated ductal carcinoma with mild lymphocytic infiltrate\par ER+ 80%,[place]+ 50%, HER 2 + breast cancer\par \par She has been on Prempro x 7 years for hot flashes. She is off now x 2 weeks. Hot flashes are ok\par She had breast augmentation with Dr Geiger. Implants exchanged 2019.  Benign biopsy in 2019\par \par 9/30/2020\par 9/25/2020 ECHO LVEF 57%\par [facility] [place] [facility]  09/18/2020 No evidence of distant metastasis.\par EKG 2/2020 faxed by PCP , repeat 9/30/2020 done , Chemo consent obtained \par Right side Medport in place, catheter tip at the distal SVC [de-identified] : Ms. KELSY CRANE  is here for a follow up appt for left breast ca, dx 8/28/2020  started neoadjuvant TCHP with onpro on 9/30/2020.  She tested positive for Covid by PCR on 10/30/2020.  A repeat test at 2 weeks was positive again on 11/13/2020.  She was admitted on 11/15/2020 with port site infection.  She was treated with IV antibiotics and was discharged on Zyvox.  Subsequently Covid PCR was done x2 and was negative.  She received cycle 3 on 12/2/2020. S/p admission 12/20/2020-12/28/2020 for port infection, port removed. s/p HP #4 on 1/7 (TC held) and TCHP 4, HP 5 on 1/27. TC # 6, HP# 7 on 3/10/2021.  She continued HP every 3 weeks, HP #13 on 7/7/21.\par \par Patient underwent bilateral mastectomies with reconstruction on 7/15/2021.  Path report reviewed with the patient and partner today.  Patient was very overwhelmed today and partner was very supportive.\par \par Left mastectomy showed invasive ductal carcinoma, high-grade, 3.4 cm, dermal lymphovascular invasion present, anterior margin 0.2 cm, tumor involved dermis.  14/18 lymph nodes were positive for metastatic disease, largest metastatic focus measured 2.2 cm with extranodal extension.\par ypT2 yp N3a\par \par Chemo s/e are resolved. Eating good, wt stable. Diarrhea resolved, no neuropathy. Hair is growing back she has a nice short hair style.\par picc line in left arm-  was removed as she wanted to travel to [place] end of june.\par echo 12/2020  No cardio pulm sx.  echo 5/24/21 LVEF 58%\par \par We discussed pathology report in detail today.  We reviewed that she completed chemo in March 2021.  Follow-up breast MRI showed partial response in the breast and pathologic lymph node had normalized in appearance.  She had significant delay until she finally had surgery in July.  She had multiple positive lymph nodes and is at high risk for local and distant recurrence.\par We discussed that she is a candidate for postmastectomy radiation therapy.  Patient was very upset and tearful to hear that.  She said she underwent bilateral mastectomies in the hope that she will not need any further treatment.  We discussed that we will not expecting to see so many positive lymph nodes but ER positive disease also does not usually have complete response to neoadjuvant chemo.  I reviewed that typically postmastectomy radiation is considered standard of care for patients with more than 3+ lymph nodes.  She agreed to at least have a consultation with radiation oncology.  Email referral was sent.\par \par We discussed data from Bernadette study which showed benefit from TDM 1 in patients with residual disease.  I had discussed the same with the patient prior to surgery and last week over the phone.  She feels her body is okay with medications and she cannot take it anymore.  She is hoping to finish 4 more Herceptin Perjeta to finish 1 year of HER-2 therapy.  Side effects of Kadcyla were reviewed and patient was reassured that side effects are mostly very manageable.  She was again very overwhelmed and tearful and did not want to switch to TDM 1.  She does not have PICC line or port and was concerned about TDM 1x14 cycles.  She wanted to get HP today.\par \par We discussed the data for neratinib in high risk HER-2 positive patients.  If she does not agree for TDM 1, she will be a candidate for neratinib.  We discussed that even though it is a pill treatment, GI toxicity is significant.\par \par We also discussed that she will take aromatase inhibitors for 10 years after finishing radiation.\par \par Patient is very reluctant and hesitant to take postmastectomy radiation and Kadcyla which I believe is necessary to keep her disease-free and long-term.  I recommend that she also seek a second opinion.  She was also encouraged to participate in clinical trials for adjuvant HER-2 positive breast cancer, if offered by second opinion oncologist.\par \par Addendum: Patient went to treatment room but then refused treatment with HP.  We will check with her in a week and reschedule either HP or TDM 1 depending on her decision.\par

## 2021-08-09 NOTE — PHYSICAL EXAM
[Fully active, able to carry on all pre-disease performance without restriction] : Status 0 - Fully active, able to carry on all pre-disease performance without restriction [Normal] : affect appropriate [de-identified] : Port removed, picc line removed [de-identified] : BL mastectomies healing well

## 2021-08-09 NOTE — REASON FOR VISIT
[Follow-Up Visit] : a follow-up [Spouse] : spouse [FreeTextEntry2] : ER+ 80%,[place]+ 50%, HER 2 + breast cancer

## 2021-08-09 NOTE — ASSESSMENT
[Curative] : Goals of care discussed with patient: Curative [FreeTextEntry1] : Ms. KELSY CRANE is a 60 year old female with clinically T2 N1 Mx poorly differentiated invasive ductal carcinoma ER/NM positive and HER-2/lazara POSITIVE. PET/CT 9/2020 ALEXX. Neoadj TCHP started 9/30/2020, completed 3/2021. She continued HP every 3 weeks, HP #13 on 7/7/21.\par \par Patient underwent bilateral mastectomies with reconstruction on 7/15/2021.  Path report reviewed with the patient and partner today.  Patient was very overwhelmed today and partner was very supportive.\par \par Left mastectomy showed invasive ductal carcinoma, high-grade, 3.4 cm, dermal lymphovascular invasion present, anterior margin 0.2 cm, tumor involved dermis.  14/18 lymph nodes were positive for metastatic disease, largest metastatic focus measured 2.2 cm with extranodal extension.\par ypT2 yp N3a\par \par Chemo s/e are resolved. Eating good, wt stable. Diarrhea resolved, no neuropathy. Hair is growing back she has a nice short hair style.\par picc line in left arm-  was removed as she wanted to travel to [place] end of june.\par echo 12/2020  No cardio pulm sx.  echo 5/24/21 LVEF 58%\par \par We discussed pathology report in detail today.  We reviewed that she completed chemo in March 2021.  Follow-up breast MRI showed partial response in the breast and pathologic lymph node had normalized in appearance.  She had significant delay until she finally had surgery in July.  She had multiple positive lymph nodes and is at high risk for local and distant recurrence.\par \par We discussed that she is a candidate for postmastectomy radiation therapy.  Patient was very upset and tearful to hear that.  She said she underwent bilateral mastectomies in the hope that she will not need any further treatment.  We discussed that we will not expecting to see so many positive lymph nodes but ER positive disease also does not usually have complete response to neoadjuvant chemo.  I reviewed that typically postmastectomy radiation is considered standard of care for patients with more than 3+ lymph nodes.  She agreed to at least have a consultation with radiation oncology.  Email referral was sent.\par \par We discussed data from Bernadette study which showed benefit from TDM 1 in patients with residual disease.  I had discussed the same with the patient prior to surgery and last week over the phone.  She feels her body is riddled with medications and she cannot take it anymore.  She is hoping to finish 4 more Herceptin Perjeta to finish 1 year of HER-2 therapy.  Side effects of Kadcyla were reviewed and patient was reassured that side effects are mostly very manageable.  She was again very overwhelmed and tearful and did not want to switch to TDM 1.  She does not have PICC line or port and was concerned about TDM 1x14 cycles will need long term access.  She wanted to get HP today.\par \par We discussed the data for neratinib in high risk HER-2 positive patients( Exte[employer]).  If she does not agree for TDM 1, she will be a candidate for neratinib.  We discussed that even though it is an oral treatment, GI toxicity is significant.\par \par We also discussed that she will take aromatase inhibitors for 10 years after finishing radiation.\par \par Patient is very reluctant and hesitant to take postmastectomy radiation and Kadcyla which I believe is necessary to keep her disease-free long-term.  I recommend that she also seek a second opinion.  She was also encouraged to participate in clinical trials for adjuvant HER-2 positive breast cancer, if offered by second opinion oncologist.\par \par Addendum: Patient went to treatment room but then refused treatment with HP.  We will check with her in a week and reschedule either HP or TDM 1 depending on her decision.\par \par - At risk for chemo induced cardiomyopathy: Check ECHO Q3m.LAst echo,5/24/21 EF 58%\par \par CHEMO ORDERS AND LABS IN [place]\par \par

## 2021-08-10 ENCOUNTER — OUTPATIENT (OUTPATIENT)
Dept: OUTPATIENT SERVICES | Facility: HOSPITAL | Age: 61
LOS: 1 days | Discharge: HOME | End: 2021-08-10

## 2021-08-10 DIAGNOSIS — Z98.890 OTHER SPECIFIED POSTPROCEDURAL STATES: Chronic | ICD-10-CM

## 2021-08-10 DIAGNOSIS — Z86.39 PERSONAL HISTORY OF OTHER ENDOCRINE, NUTRITIONAL AND METABOLIC DISEASE: Chronic | ICD-10-CM

## 2021-08-10 DIAGNOSIS — C50.919 MALIGNANT NEOPLASM OF UNSPECIFIED SITE OF UNSPECIFIED FEMALE BREAST: ICD-10-CM

## 2021-08-11 ENCOUNTER — APPOINTMENT (OUTPATIENT)
Dept: INFUSION THERAPY | Facility: HOSPITAL | Age: 61
End: 2021-08-11

## 2021-08-11 ENCOUNTER — NON-APPOINTMENT (OUTPATIENT)
Age: 61
End: 2021-08-11

## 2021-08-11 ENCOUNTER — APPOINTMENT (OUTPATIENT)
Dept: HEMATOLOGY ONCOLOGY | Facility: CLINIC | Age: 61
End: 2021-08-11

## 2021-08-17 ENCOUNTER — APPOINTMENT (OUTPATIENT)
Dept: NUCLEAR MEDICINE | Facility: IMAGING CENTER | Age: 61
End: 2021-08-17
Payer: COMMERCIAL

## 2021-08-17 ENCOUNTER — OUTPATIENT (OUTPATIENT)
Dept: OUTPATIENT SERVICES | Facility: HOSPITAL | Age: 61
LOS: 1 days | End: 2021-08-17
Payer: COMMERCIAL

## 2021-08-17 DIAGNOSIS — C50.912 MALIGNANT NEOPLASM OF UNSPECIFIED SITE OF LEFT FEMALE BREAST: ICD-10-CM

## 2021-08-17 DIAGNOSIS — Z86.39 PERSONAL HISTORY OF OTHER ENDOCRINE, NUTRITIONAL AND METABOLIC DISEASE: Chronic | ICD-10-CM

## 2021-08-17 DIAGNOSIS — Z98.890 OTHER SPECIFIED POSTPROCEDURAL STATES: Chronic | ICD-10-CM

## 2021-08-17 DIAGNOSIS — Z00.8 ENCOUNTER FOR OTHER GENERAL EXAMINATION: ICD-10-CM

## 2021-08-17 PROCEDURE — A9552: CPT

## 2021-08-17 PROCEDURE — 78815 PET IMAGE W/CT SKULL-THIGH: CPT | Mod: 26,PS

## 2021-08-17 PROCEDURE — 78815 PET IMAGE W/CT SKULL-THIGH: CPT

## 2021-08-18 ENCOUNTER — RESULT REVIEW (OUTPATIENT)
Age: 61
End: 2021-08-18

## 2021-08-18 ENCOUNTER — APPOINTMENT (OUTPATIENT)
Dept: INFUSION THERAPY | Facility: HOSPITAL | Age: 61
End: 2021-08-18

## 2021-08-18 ENCOUNTER — APPOINTMENT (OUTPATIENT)
Dept: HEMATOLOGY ONCOLOGY | Facility: CLINIC | Age: 61
End: 2021-08-18
Payer: COMMERCIAL

## 2021-08-18 DIAGNOSIS — R11.0 NAUSEA: ICD-10-CM

## 2021-08-18 DIAGNOSIS — F41.8 OTHER SPECIFIED ANXIETY DISORDERS: ICD-10-CM

## 2021-08-18 DIAGNOSIS — Z51.11 ENCOUNTER FOR ANTINEOPLASTIC CHEMOTHERAPY: ICD-10-CM

## 2021-08-18 DIAGNOSIS — R11.2 NAUSEA WITH VOMITING, UNSPECIFIED: ICD-10-CM

## 2021-08-18 DIAGNOSIS — T45.1X5A NAUSEA: ICD-10-CM

## 2021-08-18 DIAGNOSIS — R53.83 OTHER FATIGUE: ICD-10-CM

## 2021-08-18 LAB
BASOPHILS # BLD AUTO: 0.02 K/UL — SIGNIFICANT CHANGE UP (ref 0–0.2)
BASOPHILS NFR BLD AUTO: 0.5 % — SIGNIFICANT CHANGE UP (ref 0–2)
EOSINOPHIL # BLD AUTO: 0.04 K/UL — SIGNIFICANT CHANGE UP (ref 0–0.5)
EOSINOPHIL NFR BLD AUTO: 1 % — SIGNIFICANT CHANGE UP (ref 0–6)
HCT VFR BLD CALC: 29.9 % — LOW (ref 34.5–45)
HGB BLD-MCNC: 9.5 G/DL — LOW (ref 11.5–15.5)
IMM GRANULOCYTES NFR BLD AUTO: 0.5 % — SIGNIFICANT CHANGE UP (ref 0–1.5)
LYMPHOCYTES # BLD AUTO: 1.26 K/UL — SIGNIFICANT CHANGE UP (ref 1–3.3)
LYMPHOCYTES # BLD AUTO: 31.3 % — SIGNIFICANT CHANGE UP (ref 13–44)
MCHC RBC-ENTMCNC: 29.1 PG — SIGNIFICANT CHANGE UP (ref 27–34)
MCHC RBC-ENTMCNC: 31.8 G/DL — LOW (ref 32–36)
MCV RBC AUTO: 92 FL — SIGNIFICANT CHANGE UP (ref 80–100)
MONOCYTES # BLD AUTO: 0.36 K/UL — SIGNIFICANT CHANGE UP (ref 0–0.9)
MONOCYTES NFR BLD AUTO: 8.9 % — SIGNIFICANT CHANGE UP (ref 2–14)
NEUTROPHILS # BLD AUTO: 2.33 K/UL — SIGNIFICANT CHANGE UP (ref 1.8–7.4)
NEUTROPHILS NFR BLD AUTO: 57.8 % — SIGNIFICANT CHANGE UP (ref 43–77)
NRBC # BLD: 0 /100 WBCS — SIGNIFICANT CHANGE UP (ref 0–0)
PLATELET # BLD AUTO: 274 K/UL — SIGNIFICANT CHANGE UP (ref 150–400)
RBC # BLD: 3.26 M/UL — LOW (ref 3.8–5.2)
RBC # FLD: 14.5 % — SIGNIFICANT CHANGE UP (ref 10.3–14.5)
WBC # BLD: 4.03 K/UL — SIGNIFICANT CHANGE UP (ref 3.8–10.5)
WBC # FLD AUTO: 4.03 K/UL — SIGNIFICANT CHANGE UP (ref 3.8–10.5)

## 2021-08-18 PROCEDURE — 99215 OFFICE O/P EST HI 40 MIN: CPT

## 2021-08-19 ENCOUNTER — NON-APPOINTMENT (OUTPATIENT)
Age: 61
End: 2021-08-19

## 2021-08-19 PROBLEM — F41.8 SITUATIONAL ANXIETY: Status: ACTIVE | Noted: 2020-10-04

## 2021-08-19 PROBLEM — R53.83 OTHER FATIGUE: Status: ACTIVE | Noted: 2020-10-08

## 2021-08-19 PROBLEM — R11.0 CHEMOTHERAPY-INDUCED NAUSEA: Status: ACTIVE | Noted: 2020-10-08

## 2021-08-19 NOTE — ASSESSMENT
[Curative] : Goals of care discussed with patient: Curative [FreeTextEntry1] : Ms. KELSY CRANE is a 60 year old female with clinically T2 N1 Mx poorly differentiated invasive ductal carcinoma ER/HI positive and HER-2/lazara POSITIVE. PET/CT 9/2020 ALEXX. Neoadj TCHP started 9/30/2020, completed 3/2021. She continued HP every 3 weeks, HP #13 on 7/7/21.\par \par Patient underwent bilateral mastectomies with reconstruction on 7/15/2021.  Path report reviewed with the patient and partner today.  Patient was very overwhelmed today and partner was very supportive.\par \par Left mastectomy showed invasive ductal carcinoma, high-grade, 3.4 cm, dermal lymphovascular invasion present, anterior margin 0.2 cm, tumor involved dermis.  14/18 lymph nodes were positive for metastatic disease, largest metastatic focus measured 2.2 cm with extranodal extension.\par ypT2 yp N3a\par \par Chemo s/e are resolved. Eating good, wt stable. Diarrhea resolved, no neuropathy. Hair is growing back she has a nice short hair style.\par picc line in left arm-  was removed as she wanted to travel to [place] end of june.\par echo 12/2020  No cardio pulm sx.  echo 5/24/21 LVEF 58%\par \par We discussed pathology report in detail today.  We reviewed that she completed chemo in March 2021.  Follow-up breast MRI showed partial response in the breast and pathologic lymph node had normalized in appearance.  She had significant delay until she finally had surgery in July.  She had multiple positive lymph nodes and is at high risk for local and distant recurrence.\par \par We discussed that she is a candidate for postmastectomy radiation therapy.  Patient was very upset and tearful to hear that.  She said she underwent bilateral mastectomies in the hope that she will not need any further treatment.  We discussed that we will not expecting to see so many positive lymph nodes but ER positive disease also does not usually have complete response to neoadjuvant chemo.  I reviewed that typically postmastectomy radiation is considered standard of care for patients with more than 3+ lymph nodes.  She agreed to at least have a consultation with radiation oncology.  Email referral was sent.\par \par We discussed data from Bernadette study which showed benefit from TDM 1 in patients with residual disease.  I had discussed the same with the patient prior to surgery and last week over the phone.  She feels her body is riddled with medications and she cannot take it anymore.  She is hoping to finish 4 more Herceptin Perjeta to finish 1 year of HER-2 therapy.  Side effects of Kadcyla were reviewed and patient was reassured that side effects are mostly very manageable.  She was again very overwhelmed and tearful and did not want to switch to TDM 1.  She does not have PICC line or port and was concerned about TDM 1x14 cycles will need long term access.  She wanted to get HP today.\par We discussed the data for neratinib in high risk HER-2 positive patients( ExteNET).  If she does not agree for TDM 1, she will be a candidate for neratinib.  We discussed that even though it is an oral treatment, GI toxicity is significant.\par We also discussed that she will take aromatase inhibitors for 10 years after finishing radiation.\par \par Breast Cancer:Patient was  very reluctant and hesitant to take postmastectomy radiation and Kadcyla which I believe is necessary to keep her disease-free long-term.  I recommend that she also seek a second opinion.  She was also encouraged to participate in clinical trials for adjuvant HER-2 positive breast cancer, if offered by second opinion oncologist.. 8/18/21 She decided to start tx with t Kadcyla (TDM 1) x14 cycles q 3 weeks tx #1/14 today Counts good,  chemo s/e discussed, Chemo consent obtained\par Reviewed 8/17/2021 PETCT ALEXX, Nonspecific few small foci of mild FDG activity adjacent to surgical clips in left axilla results d/w patient Will repeat PET CT after completion of TDM1 x 14 cycles \par \par .- Chemo induced nausea- will use reglan q 6 hrs prn\par -Situational anxiety: anxiety 2/2 scheduled PICC line insertion procedure, will give Xanax for use pre procedure\par Emotional support given\par - At risk for chemo induced cardiomyopathy: Check ECHO Q3m.LAst echo,5/24/21 EF 58%. Reminded her to schedule next ECHO  this month\par - chemo induced fatigue and dysgeusia- \par CHEMO ORDERS AND LABS IN [place]\par \par RTC q 3 weeks for chemo\par q 9 wks for MD\par d/w DR. Bell

## 2021-08-19 NOTE — REASON FOR VISIT
[Follow-Up Visit] : a follow-up [Spouse] : spouse [FreeTextEntry2] : ER+ 80%,[place]+ 50%, HER 2 + breast cancer

## 2021-08-19 NOTE — HISTORY OF PRESENT ILLNESS
[de-identified] : Ms.Joanne Damon is a 60 year old female here for an evaluation of breast cancer. Her oncologic history is as follows:\par \par Patient noticed left breast mass for 4 months and presented to plastic surgeon's office. She was sent for breast imaging on 8/19/2020 BIRADS 5 which showed new irregular mass with calcifications and nipple retraction in the left retroareolar location on mammography, corresponding to a mass in the left breast 6:00 location 2 cm from the nipple on the concurrent breast ultrasound. Ultrasound-guided core biopsy is recommended for diagnosis.\par - Abnormal left axillary lymph nodes on mammography and sonography, including lymph nodes demonstrating microcalcifications. Ultrasound-guided core biopsy of a representative abnormal appearing left axillary lymph node with calcifications is recommended.\par - Faint loosely grouped calcifications in the upper central left breast and the upper central right breast which are indeterminate. Sampling stereotactic biopsy of both breasts is recommended.\par - Loosely grouped predominantly coarse dystrophic calcifications associated with areas of central lucency in the posterior medial right breast which are thought to represent fat necrosis. Six-month follow-up right diagnostic mammography is recommended. She underwent New irregular mass with calcifications and nipple retraction in the left retroareolar location on mammography, corresponding to a mass in the left breast 6:00 location 2 cm from the nipple on the concurrent breast ultrasound. Ultrasound-guided core biopsy is recommended for diagnosis.\par  She had a left, 6 o'clock, 2 cm FN, core biopsy on on 8/28/2020 which showed Invasive poorly differentiated ductal carcinoma with areas of micropapillary features\par - [place] score 9/9 (3 + 3 + 3)\par - Invasive tumor measures at least 1.0 cm\par - Ductal carcinoma in situ (focal), cribriform pattern with\par high nuclear grade and central necrosis\par \par  Breast, left, axillary lymph node, core biopsy\par - Invasive poorly differentiated ductal carcinoma with mild lymphocytic infiltrate\par ER+ 80%,[place]+ 50%, HER 2 + breast cancer\par \par She has been on Prempro x 7 years for hot flashes. She is off now x 2 weeks. Hot flashes are ok\par She had breast augmentation with Dr Geiger. Implants exchanged 2019.  Benign biopsy in 2019\par \par 9/30/2020\par 9/25/2020 ECHO LVEF 57%\par [facility]  09/18/2020 No evidence of distant metastasis.\par EKG 2/2020 faxed by PCP , repeat 9/30/2020 done , Chemo consent obtained \par Right side Medport in place, catheter tip at the distal SVC [de-identified] : Ms. KELSY CRANE  is here for a follow up appt for left breast ca, dx 8/28/2020  started neoadjuvant TCHP with onpro on 9/30/2020.  She tested positive for Covid by PCR on 10/30/2020.  A repeat test at 2 weeks was positive again on 11/13/2020.  She was admitted on 11/15/2020 with port site infection.  She was treated with IV antibiotics and was discharged on Zyvox.  Subsequently Covid PCR was done x2 and was negative.  She received cycle 3 on 12/2/2020. S/p admission 12/20/2020-12/28/2020 for port infection, port removed. s/p HP #4 on 1/7 (TC held) and TCHP 4, HP 5 on 1/27. TC # 6, HP# 7 on 3/10/2021.  She continued HP every 3 weeks, HP #13 on 7/7/21.\par \par Patient underwent bilateral mastectomies with reconstruction on 7/15/2021.  Path report reviewed with the patient and partner today.  Patient was very overwhelmed today and partner was very supportive.\par \par Left mastectomy showed invasive ductal carcinoma, high-grade, 3.4 cm, dermal lymphovascular invasion present, anterior margin 0.2 cm, tumor involved dermis.  14/18 lymph nodes were positive for metastatic disease, largest metastatic focus measured 2.2 cm with extranodal extension.\par ypT2 yp N3a\par \par Chemo s/e are resolved. Eating good, wt stable. Diarrhea resolved, no neuropathy. Hair is growing back she has a nice short hair style.\par picc line in left arm-  was removed as she wanted to travel to [place] end of june.\par echo 12/2020  No cardio pulm sx.  echo 5/24/21 LVEF 58%\par \par We discussed pathology report in detail today.  We reviewed that she completed chemo in March 2021.  Follow-up breast MRI showed partial response in the breast and pathologic lymph node had normalized in appearance.  She had significant delay until she finally had surgery in July.  She had multiple positive lymph nodes and is at high risk for local and distant recurrence.\par We discussed that she is a candidate for postmastectomy radiation therapy.  Patient was very upset and tearful to hear that.  She said she underwent bilateral mastectomies in the hope that she will not need any further treatment.  We discussed that we will not expecting to see so many positive lymph nodes but ER positive disease also does not usually have complete response to neoadjuvant chemo.  I reviewed that typically postmastectomy radiation is considered standard of care for patients with more than 3+ lymph nodes.  She agreed to at least have a consultation with radiation oncology.  Email referral was sent.\par \par We discussed data from Bernadette study which showed benefit from TDM 1 in patients with residual disease.  I had discussed the same with the patient prior to surgery and last week over the phone.  She feels her body is okay with medications and she cannot take it anymore.  She is hoping to finish 4 more Herceptin Perjeta to finish 1 year of HER-2 therapy.  Side effects of Kadcyla were reviewed and patient was reassured that side effects are mostly very manageable.  She was again very overwhelmed and tearful and did not want to switch to TDM 1.  She does not have PICC line or port and was concerned about TDM 1x14 cycles.  She wanted to get HP today.\par \par We discussed the data for neratinib in high risk HER-2 positive patients.  If she does not agree for TDM 1, she will be a candidate for neratinib.  We discussed that even though it is a pill treatment, GI toxicity is significant.\par We also discussed that she will take aromatase inhibitors for 10 years after finishing radiation.\par Patient is very reluctant and hesitant to take postmastectomy radiation and Kadcyla which I believe is necessary to keep her disease-free and long-term.  I recommend that she also seek a second opinion.  She was also encouraged to participate in clinical trials for adjuvant HER-2 positive breast cancer, if offered by second opinion oncologist.\par \par Addendum: Patient went to treatment room but then refused treatment with HP.  We will check with her in a week and reschedule either HP or TDM 1 depending on her decision.\par 8/18/21\par Patient here to start Kadcyla (TDM 1) x14 cycles q 3 weeks tx #1/14 today Counts good\par Patient expressed concern about hair loss with Kadcyla I explained that hair loss is usually not seen with Kadcyla chemo s/e discussed\par Medication for symptom management reviewed and questions answered\par Discussed 08/17/2021 PETCT ALEXX, Nonspecific few small foci of mild FDG activity adjacent to surgical clips in left axilla Will repeat PET CT after completion of TDM1 x 14 cycles \par Last ECHO 5/24/21 EF 58%. Reminded her to schedule next ECHO  this month.\par Chemo consent obtained she is scheduled for PICC line insertion \par Patient is a anxious about PICC line, will give Xanax for use pre procedure\par Emotional support given\par \par \par \par

## 2021-08-19 NOTE — BRIEF OPERATIVE NOTE - PRIMARY SURGEON
-Follows with Dr. Kiersten Leija for sleep apnea
-Symptoms well controlled on Symbicort, she takes Atrovent every day  -We will start albuterol as needed for rescue inhaler  -Since she already follows with Dr. Pawan Huntley for sleep apnea she she will transition her pulmonary care to him as well.
Dr. Rankin

## 2021-08-19 NOTE — PHYSICAL EXAM
[Fully active, able to carry on all pre-disease performance without restriction] : Status 0 - Fully active, able to carry on all pre-disease performance without restriction [Normal] : affect appropriate [de-identified] : Port removed, picc line removed [de-identified] : BL mastectomies healing well

## 2021-08-20 ENCOUNTER — APPOINTMENT (OUTPATIENT)
Dept: PLASTIC SURGERY | Facility: CLINIC | Age: 61
End: 2021-08-20
Payer: COMMERCIAL

## 2021-08-20 VITALS
SYSTOLIC BLOOD PRESSURE: 119 MMHG | OXYGEN SATURATION: 85 % | BODY MASS INDEX: 27.6 KG/M2 | DIASTOLIC BLOOD PRESSURE: 81 MMHG | WEIGHT: 150 LBS | TEMPERATURE: 97.8 F | HEIGHT: 62 IN | HEART RATE: 74 BPM

## 2021-08-20 PROCEDURE — 99024 POSTOP FOLLOW-UP VISIT: CPT

## 2021-08-20 NOTE — HISTORY OF PRESENT ILLNESS
[FreeTextEntry1] : Jazlyn is a 60 year old female who presents for a postop evaluation. Patient is s/p bilateral breast reconstruction with MADISON flaps on 7/15/21. She has been healing well and denies any specific complaints or concerns. She is eating and drinking well, pain well-controlled with Tylenol and has been compliant with upper extremity ROM exercises. Reports small amount of discharge noted on her underwear from her abdominal incision. Patient states she has to undergo radiation therapy.  Currently undergoing chemotherapy with Kadcyla and plan for radiation therapy ( 11/14 left axillary lymph nodes positive for metastatic carcinoma, extranodal extension is present). She is scheduled to meet with radiation oncologist in the next week.  Antibiotic therapy complete, reduction in redness and swelling appreciated by patient.

## 2021-08-20 NOTE — PHYSICAL EXAM
[de-identified] : alert, calm, cooperative\par  [de-identified] : respirations are even and unlabored\par  [de-identified] : bilateral breast incisions are well-approximated, c/d/i, open to air.  There is moderate, generalized edema (left > right), which has improved.  No signs of wound dehiscence or fluctuance. Flap is soft, skin paddle pink with good capillary refill\par  [de-identified] : transverse abdominal and umbilical incisions are well-approximated with moderate, generalized edema.  Incisions are c/d/i with Prineo in place.  No signs of fluctuance.  Mild, superficial wound dehiscence of the medial aspect of the abdominal incision, which has improved\par

## 2021-08-23 ENCOUNTER — APPOINTMENT (OUTPATIENT)
Dept: RADIATION ONCOLOGY | Facility: CLINIC | Age: 61
End: 2021-08-23
Payer: COMMERCIAL

## 2021-08-23 ENCOUNTER — OUTPATIENT (OUTPATIENT)
Dept: OUTPATIENT SERVICES | Facility: HOSPITAL | Age: 61
LOS: 1 days | Discharge: HOME | End: 2021-08-23
Payer: COMMERCIAL

## 2021-08-23 VITALS
SYSTOLIC BLOOD PRESSURE: 115 MMHG | RESPIRATION RATE: 17 BRPM | DIASTOLIC BLOOD PRESSURE: 80 MMHG | HEART RATE: 72 BPM | TEMPERATURE: 95.72 F | BODY MASS INDEX: 27.44 KG/M2 | OXYGEN SATURATION: 96 % | WEIGHT: 150 LBS

## 2021-08-23 DIAGNOSIS — Z98.890 OTHER SPECIFIED POSTPROCEDURAL STATES: Chronic | ICD-10-CM

## 2021-08-23 DIAGNOSIS — Z86.39 PERSONAL HISTORY OF OTHER ENDOCRINE, NUTRITIONAL AND METABOLIC DISEASE: Chronic | ICD-10-CM

## 2021-08-23 PROCEDURE — 99204 OFFICE O/P NEW MOD 45 MIN: CPT | Mod: GC,25

## 2021-08-23 PROCEDURE — 77263 THER RADIOLOGY TX PLNG CPLX: CPT

## 2021-08-23 RX ORDER — NYSTATIN 100000 [USP'U]/ML
100000 SUSPENSION ORAL
Qty: 60 | Refills: 2 | Status: DISCONTINUED | COMMUNITY
Start: 2020-10-07 | End: 2021-08-23

## 2021-08-23 RX ORDER — ONDANSETRON 4 MG/1
4 TABLET, ORALLY DISINTEGRATING ORAL EVERY 6 HOURS
Qty: 30 | Refills: 0 | Status: DISCONTINUED | COMMUNITY
Start: 2021-07-02 | End: 2021-08-23

## 2021-08-23 RX ORDER — ALPRAZOLAM 0.5 MG/1
0.5 TABLET ORAL
Qty: 3 | Refills: 0 | Status: DISCONTINUED | COMMUNITY
Start: 2020-09-11 | End: 2021-08-23

## 2021-08-23 RX ORDER — OXYCODONE 5 MG/1
5 TABLET ORAL
Qty: 30 | Refills: 0 | Status: DISCONTINUED | COMMUNITY
Start: 2021-07-02 | End: 2021-08-23

## 2021-08-23 RX ORDER — CEFADROXIL 500 MG/1
500 CAPSULE ORAL TWICE DAILY
Qty: 14 | Refills: 1 | Status: DISCONTINUED | COMMUNITY
Start: 2021-08-09 | End: 2021-08-23

## 2021-08-23 RX ORDER — KETOROLAC TROMETHAMINE 10 MG/1
10 TABLET, FILM COATED ORAL EVERY 6 HOURS
Qty: 20 | Refills: 0 | Status: DISCONTINUED | COMMUNITY
Start: 2021-07-02 | End: 2021-08-23

## 2021-08-23 RX ORDER — ACETAMINOPHEN 500 MG/1
500 TABLET ORAL
Qty: 40 | Refills: 0 | Status: DISCONTINUED | COMMUNITY
Start: 2021-07-02 | End: 2021-08-23

## 2021-08-23 RX ORDER — POTASSIUM CHLORIDE 1500 MG/1
20 TABLET, FILM COATED, EXTENDED RELEASE ORAL DAILY
Qty: 5 | Refills: 0 | Status: DISCONTINUED | COMMUNITY
Start: 2020-10-29 | End: 2021-08-23

## 2021-08-23 NOTE — VITALS
[Least Pain Intensity: 0/10] : 0/10 [ECOG Performance Status: 1 - Restricted in physically strenuous activity but ambulatory and able to carry out work of a light or sedentary nature] : Performance Status: 1 - Restricted in physically strenuous activity but ambulatory and able to carry out work of a light or sedentary nature, e.g., light house work, office work [Maximal Pain Intensity: 2/10] : 2/10 [70: Cares for self; unalbe to carry on normal activity or do active work.] : 70: Cares for self; unable to carry on normal activity or do active work.

## 2021-08-27 PROCEDURE — 77333 RADIATION TREATMENT AID(S): CPT | Mod: 26

## 2021-08-30 NOTE — LETTER CLOSING
[Consult Closing:] : Thank you for allowing me to participate in the care of this patient.  If you have any questions, please do not hesitate to contact me. [Sincerely yours,] : Sincerely yours, [FreeTextEntry3] : Manju Patel MD\par

## 2021-08-30 NOTE — PHYSICAL EXAM
[General Appearance - Well Nourished] : well nourished [Sclera] : the sclera and conjunctiva were normal [Extraocular Movements] : extraocular movements were intact [Outer Ear] : the ears and nose were normal in appearance [Hearing Threshold Finger Rub Not [place]] : hearing was normal [] : no respiratory distress [Exaggerated Use Of Accessory Muscles For Inspiration] : no accessory muscle use [Edema] : no peripheral edema present [Nondistended] : nondistended [Cervical Lymph Nodes Enlarged Posterior Bilaterally] : posterior cervical [Breast Palpation Mass] : no palpable masses [Breast Abnormal Lactation (Galactorrhea)] : no nipple discharge [Cervical Lymph Nodes Enlarged Anterior Bilaterally] : anterior cervical [Supraclavicular Lymph Nodes Enlarged Bilaterally] : supraclavicular [Axillary Lymph Nodes Enlarged Bilaterally] : axillary [Heart Rate And Rhythm] : heart rate and rhythm were normal [Abdomen Soft] : soft [Normal] : oriented to person, place and time, the affect was normal, the mood was normal and not anxious [de-identified] : Incision scars CDI, well healed. Mild retraction cord in left arm extending to antecubital fossa. Mild numbness just posterior to midaxillary line in high axilla. bilateral UE's symmetric. ROM adequate, able to raise L arm overhead.  [de-identified] : prominence in ant cervical vs supraclavicular region on the left, but no palpable LAD or nodules. [de-identified] : Large infraumbilical horizontal scar from flap reconstruction, CDI, with lateral dog ears. Covered with bandage centrally.

## 2021-08-30 NOTE — HISTORY OF PRESENT ILLNESS
[FreeTextEntry1] : Ms. Damon is a 61yo F with left sided central/lower IDC, HR+, Her2+, poorly differentiated, detected after palpable mass, and was subsequently diagnosed in August 2020. She was started on neoadjuvant TCHP completing in March 2021 and was on Herceptin/perjetta until July 2021. She is s/p bilateral mastectomy on 7/15/21 with ER80%, PR50%, Her2 positive (3+), with 14/18 LN positive for metastatic carcinoma, AIMEE+, LVI+, tumor involving the dermal lymphatics, 2mm anterior margin, [id number], Stage IIIC. Currently on Kadcyla. \par \par Patient noticed left breast mass for 4 months and underwent mammogram/sonogram on 8/19/2020 which showed new irregular mass with calcifications and nipple retraction in the left retroareolar location on mammography, corresponding to a mass in the left breast at 6:00 location 2 cm from the nipple on ultrasound. Abnormal left axillary lymph nodes were seen. indeterminate calcifications were seen in the upper central left breast and the upper central right breast. \par \par She underwent left breast core biopsy on 8/28/2020 which showed Invasive poorly differentiated ductal carcinommeasuring at least 1.0 cm with areas of micropapillary features, Camden score 9/9. Ductal carcinoma in situ was present focally, with cribriform pattern, high nuclear grade and central necrosis. Core biopsy of the left axillary lymph node revealed invasive poorly differentiated ductal carcinoma with mild lymphocytic infiltrate. The tumor was ER+ 80%,UT+ 50%, HER 2 positive. \par \par PETCT 09/18/2020 showed no evidence of distant metastasis.\par \par She started started neoadjuvant TCHP on 9/30/2020. She was admitted on 11/15/2020 with port site infection. She was treated with IV antibiotics and was discharged on Zyvox. She received cycle 3 on 12/2/2020. S/p admission 12/20/2020-12/28/2020 for port infection, port removed. s/p HP #4 on 1/7 (TC held) and TCHP 4, HP 5 on 1/27. TC # 6, HP# 7 on 3/10/2021. She continued HP every 3 weeks, HP #13 on 7/7/21.\par \par Patient underwent bilateral mastectomies with reconstruction on 7/15/2021. Right side was benign with 0/2 SLNB. Left mastectomy showed invasive ductal carcinoma with focal mucinous and micropapillary features, Camden grade 3, measuring 3.4 cm in greatest dimension. Dermal lymphovascular invasion was present, with tumor involving the dermis without ulceration or satellite formation. LVI was present. 3/3 SLNB positive. 11/14 ALND positive, 0/1 left IM nodes, bringing total to 14/18 lymph nodes positive for metastatic disease, largest metastatic focus measured 2.2 cm with extranodal extension.\par \par 08/17/2021 PETCT showed nonspecific few small foci of mild FDG activity adjacent to surgical clips in left axilla. Her medical oncologist is planning to repeat PET CT after completion of TDM1 x 14 cycles.  She is doing well, mild discomfort and arm pain after surgery. Able to raise arm, did not require PT. Denies prior RT, CT disease, pacemaker. \par \par \par

## 2021-09-01 ENCOUNTER — NON-APPOINTMENT (OUTPATIENT)
Age: 61
End: 2021-09-01

## 2021-09-02 ENCOUNTER — RESULT REVIEW (OUTPATIENT)
Age: 61
End: 2021-09-02

## 2021-09-02 ENCOUNTER — OUTPATIENT (OUTPATIENT)
Dept: OUTPATIENT SERVICES | Facility: HOSPITAL | Age: 61
LOS: 1 days | End: 2021-09-02
Payer: COMMERCIAL

## 2021-09-02 DIAGNOSIS — C50.912 MALIGNANT NEOPLASM OF UNSPECIFIED SITE OF LEFT FEMALE BREAST: ICD-10-CM

## 2021-09-02 DIAGNOSIS — Z98.890 OTHER SPECIFIED POSTPROCEDURAL STATES: Chronic | ICD-10-CM

## 2021-09-02 DIAGNOSIS — Z86.39 PERSONAL HISTORY OF OTHER ENDOCRINE, NUTRITIONAL AND METABOLIC DISEASE: Chronic | ICD-10-CM

## 2021-09-02 PROCEDURE — 36573 INSJ PICC RS&I 5 YR+: CPT

## 2021-09-03 PROCEDURE — 77338 DESIGN MLC DEVICE FOR IMRT: CPT | Mod: 26

## 2021-09-03 PROCEDURE — 77300 RADIATION THERAPY DOSE PLAN: CPT | Mod: 26

## 2021-09-03 PROCEDURE — 77301 RADIOTHERAPY DOSE PLAN IMRT: CPT | Mod: 26

## 2021-09-08 ENCOUNTER — LABORATORY RESULT (OUTPATIENT)
Age: 61
End: 2021-09-08

## 2021-09-08 ENCOUNTER — RESULT REVIEW (OUTPATIENT)
Age: 61
End: 2021-09-08

## 2021-09-08 ENCOUNTER — APPOINTMENT (OUTPATIENT)
Dept: INFUSION THERAPY | Facility: HOSPITAL | Age: 61
End: 2021-09-08

## 2021-09-08 LAB
BASOPHILS # BLD AUTO: 0.04 K/UL — SIGNIFICANT CHANGE UP (ref 0–0.2)
BASOPHILS NFR BLD AUTO: 0.8 % — SIGNIFICANT CHANGE UP (ref 0–2)
EOSINOPHIL # BLD AUTO: 0.08 K/UL — SIGNIFICANT CHANGE UP (ref 0–0.5)
EOSINOPHIL NFR BLD AUTO: 1.6 % — SIGNIFICANT CHANGE UP (ref 0–6)
HCT VFR BLD CALC: 31.6 % — LOW (ref 34.5–45)
HGB BLD-MCNC: 10 G/DL — LOW (ref 11.5–15.5)
IMM GRANULOCYTES NFR BLD AUTO: 0.2 % — SIGNIFICANT CHANGE UP (ref 0–1.5)
LYMPHOCYTES # BLD AUTO: 1.9 K/UL — SIGNIFICANT CHANGE UP (ref 1–3.3)
LYMPHOCYTES # BLD AUTO: 38.4 % — SIGNIFICANT CHANGE UP (ref 13–44)
MCHC RBC-ENTMCNC: 28.7 PG — SIGNIFICANT CHANGE UP (ref 27–34)
MCHC RBC-ENTMCNC: 31.6 G/DL — LOW (ref 32–36)
MCV RBC AUTO: 90.5 FL — SIGNIFICANT CHANGE UP (ref 80–100)
MONOCYTES # BLD AUTO: 0.52 K/UL — SIGNIFICANT CHANGE UP (ref 0–0.9)
MONOCYTES NFR BLD AUTO: 10.5 % — SIGNIFICANT CHANGE UP (ref 2–14)
NEUTROPHILS # BLD AUTO: 2.4 K/UL — SIGNIFICANT CHANGE UP (ref 1.8–7.4)
NEUTROPHILS NFR BLD AUTO: 48.5 % — SIGNIFICANT CHANGE UP (ref 43–77)
NRBC # BLD: 0 /100 WBCS — SIGNIFICANT CHANGE UP (ref 0–0)
PLATELET # BLD AUTO: 213 K/UL — SIGNIFICANT CHANGE UP (ref 150–400)
RBC # BLD: 3.49 M/UL — LOW (ref 3.8–5.2)
RBC # FLD: 14.7 % — HIGH (ref 10.3–14.5)
WBC # BLD: 4.95 K/UL — SIGNIFICANT CHANGE UP (ref 3.8–10.5)
WBC # FLD AUTO: 4.95 K/UL — SIGNIFICANT CHANGE UP (ref 3.8–10.5)

## 2021-09-09 DIAGNOSIS — C50.919 MALIGNANT NEOPLASM OF UNSPECIFIED SITE OF UNSPECIFIED FEMALE BREAST: ICD-10-CM

## 2021-09-09 DIAGNOSIS — Z45.2 ENCOUNTER FOR ADJUSTMENT AND MANAGEMENT OF VASCULAR ACCESS DEVICE: ICD-10-CM

## 2021-09-13 PROCEDURE — G6017: CPT

## 2021-09-14 ENCOUNTER — APPOINTMENT (OUTPATIENT)
Dept: CARDIOLOGY | Facility: CLINIC | Age: 61
End: 2021-09-14
Payer: COMMERCIAL

## 2021-09-14 PROCEDURE — 93356 MYOCRD STRAIN IMG SPCKL TRCK: CPT

## 2021-09-14 PROCEDURE — 93306 TTE W/DOPPLER COMPLETE: CPT

## 2021-09-14 PROCEDURE — G6017: CPT

## 2021-09-15 ENCOUNTER — NON-APPOINTMENT (OUTPATIENT)
Age: 61
End: 2021-09-15

## 2021-09-15 PROCEDURE — G6017: CPT

## 2021-09-15 NOTE — VITALS
[Maximal Pain Intensity: 2/10] : 2/10 [Least Pain Intensity: 0/10] : 0/10 [70: Cares for self; unalbe to carry on normal activity or do active work.] : 70: Cares for self; unable to carry on normal activity or do active work. [ECOG Performance Status: 1 - Restricted in physically strenuous activity but ambulatory and able to carry out work of a light or sedentary nature] : Performance Status: 1 - Restricted in physically strenuous activity but ambulatory and able to carry out work of a light or sedentary nature, e.g., light house work, office work

## 2021-09-15 NOTE — REVIEW OF SYSTEMS
[Alopecia: Grade 0] : Alopecia: Grade 0 [Skin Atrophy: Grade 0] : Skin Atrophy: Grade 0 [Pruritus: Grade 0] : Pruritus: Grade 0 [Skin Hyperpigmentation: Grade 0] : Skin Hyperpigmentation: Grade 0 [Skin Induration: Grade 0] : Skin Induration: Grade 0 [Dermatitis Radiation: Grade 0] : Dermatitis Radiation: Grade 0

## 2021-09-16 PROCEDURE — G6017: CPT

## 2021-09-17 PROCEDURE — 77427 RADIATION TX MANAGEMENT X5: CPT

## 2021-09-17 PROCEDURE — G6017: CPT

## 2021-09-20 PROCEDURE — G6017: CPT

## 2021-09-21 PROCEDURE — G6017: CPT

## 2021-09-22 ENCOUNTER — NON-APPOINTMENT (OUTPATIENT)
Age: 61
End: 2021-09-22

## 2021-09-22 PROCEDURE — G6017: CPT

## 2021-09-22 NOTE — DISEASE MANAGEMENT
[Pathological] : TNM Stage: p [IIIC] : IIIC [TTNM] : 2 [NTNM] : 3a [MTNM] : 0 [de-identified] : [id number] [de-identified] : [id number] [de-identified] : Left chest

## 2021-09-22 NOTE — DISEASE MANAGEMENT
[Pathological] : TNM Stage: p [IIIC] : IIIC [TTNM] : 2 [NTNM] : 3a [MTNM] : 0 [de-identified] : [id number] [de-identified] : [id number] [de-identified] : Left chest

## 2021-09-22 NOTE — PHYSICAL EXAM
[] : no respiratory distress [Edema] : no peripheral edema present [Normal] : well developed, well nourished, in no acute distress [de-identified] : Bilateral mastectomy incisions intact, mild erythema especially lower outer left

## 2021-09-22 NOTE — VITALS
[Least Pain Intensity: 0/10] : 0/10 [Maximal Pain Intensity: 2/10] : 2/10 [ECOG Performance Status: 1 - Restricted in physically strenuous activity but ambulatory and able to carry out work of a light or sedentary nature] : Performance Status: 1 - Restricted in physically strenuous activity but ambulatory and able to carry out work of a light or sedentary nature, e.g., light house work, office work [80: Normal activity with effort; some signs or symptoms of disease.] : 80: Normal activity with effort; some signs or symptoms of disease.

## 2021-09-22 NOTE — HISTORY OF PRESENT ILLNESS
[FreeTextEntry1] : Ms. Damon is a 60 year-old woman with clinical T2N1M0 HER2 positive poorly differentiated left breast invasive ductal carcinoma. She received neoadjuvant chemotherapy with TCHP followed by mastectomy showing stage IIIC, [id number].  She is now receiving radiation to the left chest wall and nodes.\par \par She started RT this week.  She is feeling well, without breast pain or fatigue. Feels mild swelling but somewhat improved since surgery. No concerns.

## 2021-09-22 NOTE — HISTORY OF PRESENT ILLNESS
If you are a smoker, it is important for your health to stop smoking. Please be aware that second hand smoke is also harmful.
[FreeTextEntry1] : Ms. Damon is a 60 year-old woman with clinical T2N1M0 HER2 positive poorly differentiated left breast invasive ductal carcinoma. She received neoadjuvant chemotherapy with TCHP followed by mastectomy showing stage IIIC, [id number].  She is now receiving radiation to the left chest wall and nodes.\par \par She is tolerating RT.  She is feeling well, without breast pain or fatigue. Feels mild swelling and heaviness of left side, more firm and still mildly red.

## 2021-09-22 NOTE — PHYSICAL EXAM
[Extraocular Movements] : extraocular movements were intact [Hearing Threshold Finger Rub Not [place]] : hearing was normal [] : no respiratory distress [Abdomen Tenderness] : non-tender [Normal] : well developed, well nourished, in no acute distress [de-identified] : Bilateral mastectomy incisions intact.  No erythema or hyperpigmentation.

## 2021-09-23 PROCEDURE — G6017: CPT

## 2021-09-24 ENCOUNTER — OUTPATIENT (OUTPATIENT)
Dept: OUTPATIENT SERVICES | Facility: HOSPITAL | Age: 61
LOS: 1 days | Discharge: HOME | End: 2021-09-24

## 2021-09-24 DIAGNOSIS — Z86.39 PERSONAL HISTORY OF OTHER ENDOCRINE, NUTRITIONAL AND METABOLIC DISEASE: Chronic | ICD-10-CM

## 2021-09-24 DIAGNOSIS — C50.919 MALIGNANT NEOPLASM OF UNSPECIFIED SITE OF UNSPECIFIED FEMALE BREAST: ICD-10-CM

## 2021-09-24 DIAGNOSIS — Z98.890 OTHER SPECIFIED POSTPROCEDURAL STATES: Chronic | ICD-10-CM

## 2021-09-24 PROCEDURE — G6017: CPT

## 2021-09-24 PROCEDURE — 77427 RADIATION TX MANAGEMENT X5: CPT

## 2021-09-27 PROCEDURE — G6017: CPT

## 2021-09-28 PROCEDURE — G6017: CPT

## 2021-09-29 ENCOUNTER — RESULT REVIEW (OUTPATIENT)
Age: 61
End: 2021-09-29

## 2021-09-29 ENCOUNTER — NON-APPOINTMENT (OUTPATIENT)
Age: 61
End: 2021-09-29

## 2021-09-29 ENCOUNTER — APPOINTMENT (OUTPATIENT)
Dept: INFUSION THERAPY | Facility: HOSPITAL | Age: 61
End: 2021-09-29

## 2021-09-29 DIAGNOSIS — Z51.11 ENCOUNTER FOR ANTINEOPLASTIC CHEMOTHERAPY: ICD-10-CM

## 2021-09-29 DIAGNOSIS — R11.2 NAUSEA WITH VOMITING, UNSPECIFIED: ICD-10-CM

## 2021-09-29 LAB
BASOPHILS # BLD AUTO: 0.03 K/UL — SIGNIFICANT CHANGE UP (ref 0–0.2)
BASOPHILS NFR BLD AUTO: 1 % — SIGNIFICANT CHANGE UP (ref 0–2)
EOSINOPHIL # BLD AUTO: 0.02 K/UL — SIGNIFICANT CHANGE UP (ref 0–0.5)
EOSINOPHIL NFR BLD AUTO: 0.7 % — SIGNIFICANT CHANGE UP (ref 0–6)
HCT VFR BLD CALC: 35.9 % — SIGNIFICANT CHANGE UP (ref 34.5–45)
HGB BLD-MCNC: 11.5 G/DL — SIGNIFICANT CHANGE UP (ref 11.5–15.5)
IMM GRANULOCYTES NFR BLD AUTO: 1.3 % — SIGNIFICANT CHANGE UP (ref 0–1.5)
LYMPHOCYTES # BLD AUTO: 0.53 K/UL — LOW (ref 1–3.3)
LYMPHOCYTES # BLD AUTO: 17.4 % — SIGNIFICANT CHANGE UP (ref 13–44)
MCHC RBC-ENTMCNC: 28.2 PG — SIGNIFICANT CHANGE UP (ref 27–34)
MCHC RBC-ENTMCNC: 32 G/DL — SIGNIFICANT CHANGE UP (ref 32–36)
MCV RBC AUTO: 88 FL — SIGNIFICANT CHANGE UP (ref 80–100)
MONOCYTES # BLD AUTO: 0.38 K/UL — SIGNIFICANT CHANGE UP (ref 0–0.9)
MONOCYTES NFR BLD AUTO: 12.5 % — SIGNIFICANT CHANGE UP (ref 2–14)
NEUTROPHILS # BLD AUTO: 2.04 K/UL — SIGNIFICANT CHANGE UP (ref 1.8–7.4)
NEUTROPHILS NFR BLD AUTO: 67.1 % — SIGNIFICANT CHANGE UP (ref 43–77)
NRBC # BLD: 0 /100 WBCS — SIGNIFICANT CHANGE UP (ref 0–0)
PLATELET # BLD AUTO: 148 K/UL — LOW (ref 150–400)
RBC # BLD: 4.08 M/UL — SIGNIFICANT CHANGE UP (ref 3.8–5.2)
RBC # FLD: 15.2 % — HIGH (ref 10.3–14.5)
WBC # BLD: 3.04 K/UL — LOW (ref 3.8–10.5)
WBC # FLD AUTO: 3.04 K/UL — LOW (ref 3.8–10.5)

## 2021-09-29 PROCEDURE — G6017: CPT

## 2021-09-29 NOTE — REVIEW OF SYSTEMS
[Constipation: Grade 0] : Constipation: Grade 0 [Diarrhea: Grade 0] : Diarrhea: Grade 0 [Fatigue: Grade 1 - Fatigue relieved by rest] : Fatigue: Grade 1 - Fatigue relieved by rest [Breast Pain: Grade 0] : Breast Pain: Grade 0 [Alopecia: Grade 0] : Alopecia: Grade 0 [Pruritus: Grade 0] : Pruritus: Grade 0 [Skin Atrophy: Grade 0] : Skin Atrophy: Grade 0 [Skin Hyperpigmentation: Grade 0] : Skin Hyperpigmentation: Grade 0 [Skin Induration: Grade 0] : Skin Induration: Grade 0 [Dermatitis Radiation: Grade 1 - Faint erythema or dry desquamation] : Dermatitis Radiation: Grade 1 - Faint erythema or dry desquamation

## 2021-09-30 ENCOUNTER — NON-APPOINTMENT (OUTPATIENT)
Age: 61
End: 2021-09-30

## 2021-09-30 PROCEDURE — G6017: CPT

## 2021-10-01 PROCEDURE — 77427 RADIATION TX MANAGEMENT X5: CPT

## 2021-10-01 PROCEDURE — G6017: CPT

## 2021-10-04 PROCEDURE — G6017: CPT

## 2021-10-06 NOTE — DISEASE MANAGEMENT
[Pathological] : TNM Stage: p [IIIC] : IIIC [TTNM] : 2 [NTNM] : 3a [MTNM] : 0 [de-identified] : [id number] [de-identified] : [id number] [de-identified] : Left chest wal and nodes

## 2021-10-06 NOTE — PHYSICAL EXAM
[Normal] : well developed, well nourished, in no acute distress [de-identified] : Bilateral mastectomy incisions intact, mild erythema of left side

## 2021-10-06 NOTE — HISTORY OF PRESENT ILLNESS
[FreeTextEntry1] : Ms. Damon is a 60 year-old woman with clinical T2N1M0 HER2 positive poorly differentiated left breast invasive ductal carcinoma. She received neoadjuvant chemotherapy with TCHP followed by mastectomy showing stage IIIC, [id number].  She is now receiving adjuvant radiation therapy. \par \par She is feeling well, without breast pain or fatigue. Feels mild swelling and heaviness of left side, more firm and still mildly red. Using cream to breast.

## 2021-10-20 ENCOUNTER — APPOINTMENT (OUTPATIENT)
Dept: HEMATOLOGY ONCOLOGY | Facility: CLINIC | Age: 61
End: 2021-10-20
Payer: COMMERCIAL

## 2021-10-20 ENCOUNTER — RESULT REVIEW (OUTPATIENT)
Age: 61
End: 2021-10-20

## 2021-10-20 ENCOUNTER — APPOINTMENT (OUTPATIENT)
Dept: INFUSION THERAPY | Facility: HOSPITAL | Age: 61
End: 2021-10-20

## 2021-10-20 ENCOUNTER — LABORATORY RESULT (OUTPATIENT)
Age: 61
End: 2021-10-20

## 2021-10-20 VITALS
HEART RATE: 92 BPM | OXYGEN SATURATION: 96 % | SYSTOLIC BLOOD PRESSURE: 122 MMHG | RESPIRATION RATE: 16 BRPM | TEMPERATURE: 97.3 F | BODY MASS INDEX: 26.89 KG/M2 | WEIGHT: 147 LBS | DIASTOLIC BLOOD PRESSURE: 81 MMHG

## 2021-10-20 LAB
BASOPHILS # BLD AUTO: 0.02 K/UL — SIGNIFICANT CHANGE UP (ref 0–0.2)
BASOPHILS NFR BLD AUTO: 0.6 % — SIGNIFICANT CHANGE UP (ref 0–2)
EOSINOPHIL # BLD AUTO: 0.03 K/UL — SIGNIFICANT CHANGE UP (ref 0–0.5)
EOSINOPHIL NFR BLD AUTO: 0.9 % — SIGNIFICANT CHANGE UP (ref 0–6)
HCT VFR BLD CALC: 34 % — LOW (ref 34.5–45)
HGB BLD-MCNC: 11 G/DL — LOW (ref 11.5–15.5)
IMM GRANULOCYTES NFR BLD AUTO: 0.6 % — SIGNIFICANT CHANGE UP (ref 0–1.5)
LYMPHOCYTES # BLD AUTO: 1.05 K/UL — SIGNIFICANT CHANGE UP (ref 1–3.3)
LYMPHOCYTES # BLD AUTO: 33 % — SIGNIFICANT CHANGE UP (ref 13–44)
MCHC RBC-ENTMCNC: 28 PG — SIGNIFICANT CHANGE UP (ref 27–34)
MCHC RBC-ENTMCNC: 32.4 G/DL — SIGNIFICANT CHANGE UP (ref 32–36)
MCV RBC AUTO: 86.5 FL — SIGNIFICANT CHANGE UP (ref 80–100)
MONOCYTES # BLD AUTO: 0.46 K/UL — SIGNIFICANT CHANGE UP (ref 0–0.9)
MONOCYTES NFR BLD AUTO: 14.5 % — HIGH (ref 2–14)
NEUTROPHILS # BLD AUTO: 1.6 K/UL — LOW (ref 1.8–7.4)
NEUTROPHILS NFR BLD AUTO: 50.4 % — SIGNIFICANT CHANGE UP (ref 43–77)
NRBC # BLD: 0 /100 WBCS — SIGNIFICANT CHANGE UP (ref 0–0)
PLATELET # BLD AUTO: 136 K/UL — LOW (ref 150–400)
RBC # BLD: 3.93 M/UL — SIGNIFICANT CHANGE UP (ref 3.8–5.2)
RBC # FLD: 16.6 % — HIGH (ref 10.3–14.5)
WBC # BLD: 3.18 K/UL — LOW (ref 3.8–10.5)
WBC # FLD AUTO: 3.18 K/UL — LOW (ref 3.8–10.5)

## 2021-10-20 PROCEDURE — 99215 OFFICE O/P EST HI 40 MIN: CPT

## 2021-10-20 RX ORDER — PERTUZUMAB 30 MG/ML
0 INJECTION, SOLUTION, CONCENTRATE INTRAVENOUS
Qty: 0 | Refills: 0 | DISCHARGE

## 2021-10-20 RX ORDER — TRASTUZUMAB-DKST 420 MG/20ML
0 INJECTION, POWDER, LYOPHILIZED, FOR SOLUTION INTRAVENOUS
Qty: 0 | Refills: 0 | DISCHARGE

## 2021-10-26 NOTE — ASSESSMENT
[Curative] : Goals of care discussed with patient: Curative [FreeTextEntry1] : Ms. KELSY CRANE  is here for a follow up appt for left breast ca, dx 8/28/2020  started neoadjuvant TCHP with onpro on 9/30/2020.  She tested positive for Covid by PCR on 10/30/2020.  A repeat test at 2 weeks was positive again on 11/13/2020.  She was admitted on 11/15/2020 with port site infection.  She was treated with IV antibiotics and was discharged on Zyvox.  Subsequently Covid PCR was done x2 and was negative.  She received cycle 3 on 12/2/2020. S/p admission 12/20/2020-12/28/2020 for port infection, port removed. s/p HP #4 on 1/7 (TC held) and TCHP 4, HP 5 on 1/27. TC # 6, HP# 7 on 3/10/2021.  She continued HP every 3 weeks, HP #13 on 7/7/21. Patient underwent bilateral mastectomies with reconstruction on 7/15/2021.  Left mastectomy showed invasive ductal carcinoma, high-grade, 3.4 cm, dermal lymphovascular invasion present, anterior margin 0.2 cm, tumor involved dermis.  14/18 lymph nodes were positive for metastatic disease, largest metastatic focus measured 2.2 cm with extranodal extension. ypT2 yp N3a. Adjuvant Kadycla started 8/18/21\par \par Breast Cancer: ER positive/HER-2/lazara positive.  Status post neoadjuvant chemotherapy with significant residual disease.  Status post bilateral mastectomy 7/2021.  Patient started Kadcyla 8/2021.  Cycle #5 today. \par Continue Kadcyla for 14 treatments.  Monitor for cardiotoxicity and neuropathy.\par Patient completed radiation therapy 2 weeks ago.  He has mild fatigue and skin reaction.  Otherwise overall doing well. We discussed to start endocrine therapy with anastrozole.  Side effects were reviewed.\par I recommend Arimidex 1 mg daily for 10 years. I discussed the risks and benefits of aromatase inhibitor therapy including fatigue, coronary artery disease, hyperlipidemia, vaginal dryness, mood changes, hot flashes, GI disturbances, arthralgias, myalgias, and osteoporosis. I will obtain bone density scan to evaluate her bone health prior to starting anastrozole. I recommend her to continue calcium and vitamin D supplementation. \par Her medical comorbidities are managed well . She will continue to follow with her PCP for general health maintenance including fasting lipid profile every year.\par \par - At risk for chemo induced cardiomyopathy: Check ECHO Q3m to monitor for treatment induced cardiotoxicity\par -Chemo induced peripheral neuropathy: She reports mild numbness and tingling occasionally in her fingertips and toes.  We discussed possibility of Kadcyla induced neuropathy.  No pain.  The symptoms are inconsistent and are not affecting her quality of life at this point therefore we will continue Kadcyla but monitor her closely.\par -Chemo induced pancytopenia: Very mild.  Monitor counts closely.  No suspicion for marrow disease at this time but we will continue to monitor.\par - Chemo induced transaminitis: Grade 1.  Continue to monitor\par -Pulmonary nodules: 08/17/2021 PETCT ALEXX, Nonspecific few small foci of mild FDG activity adjacent to surgical clips in left axilla Will repeat PET CT after completion of TDM1 x 14 cycles \par -Peripheral access issues: picc line healthy\par \par CHEMO ORDERS AND LABS ORDERED IN [place] FOR TODAY\par \par RTC q 3 weeks for chemo\par q 9 wks for MD\par

## 2021-10-26 NOTE — PHYSICAL EXAM
[Fully active, able to carry on all pre-disease performance without restriction] : Status 0 - Fully active, able to carry on all pre-disease performance without restriction [Normal] : affect appropriate [de-identified] : Port removed, picc line + [de-identified] : BL mastectomies healing well, mild post RT changes

## 2021-10-26 NOTE — REASON FOR VISIT
[Follow-Up Visit] : a follow-up [Spouse] : spouse [FreeTextEntry2] : ER+ 80%,[place]+ 50%, HER 2 + breast cancer

## 2021-10-26 NOTE — HISTORY OF PRESENT ILLNESS
[de-identified] : Ms.Joanne Damon is a 60 year old female here for an evaluation of breast cancer. Her oncologic history is as follows:\par \par Patient noticed left breast mass for 4 months and presented to plastic surgeon's office. She was sent for breast imaging on 8/19/2020 BIRADS 5 which showed new irregular mass with calcifications and nipple retraction in the left retroareolar location on mammography, corresponding to a mass in the left breast 6:00 location 2 cm from the nipple on the concurrent breast ultrasound. Ultrasound-guided core biopsy is recommended for diagnosis.\par - Abnormal left axillary lymph nodes on mammography and sonography, including lymph nodes demonstrating microcalcifications. Ultrasound-guided core biopsy of a representative abnormal appearing left axillary lymph node with calcifications is recommended.\par - Faint loosely grouped calcifications in the upper central left breast and the upper central right breast which are indeterminate. Sampling stereotactic biopsy of both breasts is recommended.\par - Loosely grouped predominantly coarse dystrophic calcifications associated with areas of central lucency in the posterior medial right breast which are thought to represent fat necrosis. Six-month follow-up right diagnostic mammography is recommended. She underwent New irregular mass with calcifications and nipple retraction in the left retroareolar location on mammography, corresponding to a mass in the left breast 6:00 location 2 cm from the nipple on the concurrent breast ultrasound. Ultrasound-guided core biopsy is recommended for diagnosis.\par  She had a left, 6 o'clock, 2 cm FN, core biopsy on on 8/28/2020 which showed Invasive poorly differentiated ductal carcinoma with areas of micropapillary features\par - Abbott score 9/9 (3 + 3 + 3)\par - Invasive tumor measures at least 1.0 cm\par - Ductal carcinoma in situ (focal), cribriform pattern with\par high nuclear grade and central necrosis\par \par  Breast, left, axillary lymph node, core biopsy\par - Invasive poorly differentiated ductal carcinoma with mild lymphocytic infiltrate\par ER+ 80%,ID+ 50%, HER 2 + breast cancer\par \par She has been on Prempro x 7 years for hot flashes. She is off now x 2 weeks. Hot flashes are ok\par She had breast augmentation with Dr Geiger. Implants exchanged 2019.  Benign biopsy in 2019\par \par 9/30/2020\par 9/25/2020 ECHO LVEF 57%\par PETCT SKUL-THI ONC  09/18/2020 No evidence of distant metastasis.\par EKG 2/2020 faxed by PCP , repeat 9/30/2020 done , Chemo consent obtained \par Right side Medport in place, catheter tip at the distal SVC\par \par 8/4/2021\par We discussed pathology report in detail today.  We reviewed that she completed chemo in March 2021.  Follow-up breast MRI showed partial response in the breast and pathologic lymph node had normalized in appearance.  She had significant delay until she finally had surgery in July. Patient underwent bilateral mastectomies with reconstruction on 7/15/2021.  Left mastectomy showed invasive ductal carcinoma, high-grade, 3.4 cm, dermal lymphovascular invasion present, anterior margin 0.2 cm, tumor involved dermis.  14/18 lymph nodes were positive for metastatic disease, largest metastatic focus measured 2.2 cm with extranodal extension. ypT2 yp N3a.  She had multiple positive lymph nodes and is at high risk for local and distant recurrence.\par We discussed that she is a candidate for postmastectomy radiation therapy.  Patient was very upset and tearful to hear that.  She said she underwent bilateral mastectomies in the hope that she will not need any further treatment.  We discussed that we will not expecting to see so many positive lymph nodes but ER positive disease also does not usually have complete response to neoadjuvant chemo.  I reviewed that typically postmastectomy radiation is considered standard of care for patients with more than 3+ lymph nodes.  She agreed to at least have a consultation with radiation oncology.  Email referral was sent.\par \par We discussed data from Bernadette study which showed benefit from TDM 1 in patients with residual disease.  I had discussed the same with the patient prior to surgery and last week over the phone.  She feels her body is okay with medications and she cannot take it anymore.  She is hoping to finish 4 more Herceptin Perjeta to finish 1 year of HER-2 therapy.  Side effects of Kadcyla were reviewed and patient was reassured that side effects are mostly very manageable.  She was again very overwhelmed and tearful and did not want to switch to TDM 1.  She does not have PICC line or port and was concerned about TDM 1x14 cycles.  She wanted to get HP today.\par \par We discussed the data for neratinib in high risk HER-2 positive patients.  If she does not agree for TDM 1, she will be a candidate for neratinib.  We discussed that even though it is a pill treatment, GI toxicity is significant.\par \par We also discussed that she will take aromatase inhibitors for 10 years after finishing radiation.\par Patient is very reluctant and hesitant to take postmastectomy radiation and Kadcyla which I believe is necessary to keep her disease-free and long-term.  I recommend that she also seek a second opinion.  She was also encouraged to participate in clinical trials for adjuvant HER-2 positive breast cancer, if offered by second opinion oncologist.\par \par Addendum: Patient went to treatment room but then refused treatment with HP.  We will check with her in a week and reschedule either HP or TDM 1 depending on her decision.\par \par 8/18/21\par Patient here to start Kadcyla (TDM 1) x14 cycles q 3 weeks tx #1/14 today Counts good\par Patient expressed concern about hair loss with Kadcyla I explained that hair loss is usually not seen with Kadcyla chemo s/e discussed\par Medication for symptom management reviewed and questions answered\par Discussed 08/17/2021 PETCT ALEXX, Nonspecific few small foci of mild FDG activity adjacent to surgical clips in left axilla Will repeat PET CT after completion of TDM1 x 14 cycles \par Last ECHO 5/24/21 EF 58%. Reminded her to schedule next ECHO  this month.\par Chemo consent obtained she is scheduled for PICC line insertion \par Patient is a anxious about PICC line, will give Xanax for use pre procedure\par Emotional support given [de-identified] : Ms. KELSY CRANE  is here for a follow up appt for left breast ca, dx 8/28/2020  started neoadjuvant TCHP with onpro on 9/30/2020.  She tested positive for Covid by PCR on 10/30/2020.  A repeat test at 2 weeks was positive again on 11/13/2020.  She was admitted on 11/15/2020 with port site infection.  She was treated with IV antibiotics and was discharged on Zyvox.  Subsequently Covid PCR was done x2 and was negative.  She received cycle 3 on 12/2/2020. S/p admission 12/20/2020-12/28/2020 for port infection, port removed. s/p HP #4 on 1/7 (TC held) and TCHP 4, HP 5 on 1/27. TC # 6, HP# 7 on 3/10/2021.  She continued HP every 3 weeks, HP #13 on 7/7/21. Patient underwent bilateral mastectomies with reconstruction on 7/15/2021.  Left mastectomy showed invasive ductal carcinoma, high-grade, 3.4 cm, dermal lymphovascular invasion present, anterior margin 0.2 cm, tumor involved dermis.  14/18 lymph nodes were positive for metastatic disease, largest metastatic focus measured 2.2 cm with extranodal extension. ypT2 yp N3a. Adjuvant Kadycla started 8/18/21\par \par Patient completed radiation therapy 2 weeks ago.  He has mild fatigue and skin reaction.  Otherwise overall doing well.  She reports mild numbness and tingling occasionally in her fingertips and toes.  We discussed possibility of Kadcyla induced neuropathy.  No pain.  The symptoms are inconsistent and are not affecting her quality of life at this point therefore we will continue Kadcyla but monitor her closely.\par We discussed to start endocrine therapy with anastrozole.  Side effects were reviewed.\par echo 9/2021\par No issues with PICC line\par 08/17/2021 PETCT ALEXX, Nonspecific few small foci of mild FDG activity adjacent to surgical clips in left axilla Will repeat PET CT after completion of TDM1 x 14 cycles \par She has mild pancytopenia and transaminitis 2/2 kadcyla, monitor carefully

## 2021-11-01 ENCOUNTER — NON-APPOINTMENT (OUTPATIENT)
Age: 61
End: 2021-11-01

## 2021-11-08 ENCOUNTER — OUTPATIENT (OUTPATIENT)
Dept: OUTPATIENT SERVICES | Facility: HOSPITAL | Age: 61
LOS: 1 days | Discharge: HOME | End: 2021-11-08

## 2021-11-08 DIAGNOSIS — Z86.39 PERSONAL HISTORY OF OTHER ENDOCRINE, NUTRITIONAL AND METABOLIC DISEASE: Chronic | ICD-10-CM

## 2021-11-08 DIAGNOSIS — C50.919 MALIGNANT NEOPLASM OF UNSPECIFIED SITE OF UNSPECIFIED FEMALE BREAST: ICD-10-CM

## 2021-11-08 DIAGNOSIS — Z98.890 OTHER SPECIFIED POSTPROCEDURAL STATES: Chronic | ICD-10-CM

## 2021-11-10 ENCOUNTER — APPOINTMENT (OUTPATIENT)
Dept: INFUSION THERAPY | Facility: HOSPITAL | Age: 61
End: 2021-11-10

## 2021-11-10 ENCOUNTER — RESULT REVIEW (OUTPATIENT)
Age: 61
End: 2021-11-10

## 2021-11-10 ENCOUNTER — LABORATORY RESULT (OUTPATIENT)
Age: 61
End: 2021-11-10

## 2021-11-10 ENCOUNTER — APPOINTMENT (OUTPATIENT)
Dept: RADIATION ONCOLOGY | Facility: CLINIC | Age: 61
End: 2021-11-10
Payer: COMMERCIAL

## 2021-11-10 VITALS
SYSTOLIC BLOOD PRESSURE: 134 MMHG | RESPIRATION RATE: 16 BRPM | WEIGHT: 149.58 LBS | BODY MASS INDEX: 27.53 KG/M2 | HEART RATE: 88 BPM | HEIGHT: 62 IN | OXYGEN SATURATION: 100 % | DIASTOLIC BLOOD PRESSURE: 81 MMHG

## 2021-11-10 LAB
BASOPHILS # BLD AUTO: 0.02 K/UL — SIGNIFICANT CHANGE UP (ref 0–0.2)
BASOPHILS NFR BLD AUTO: 0.5 % — SIGNIFICANT CHANGE UP (ref 0–2)
EOSINOPHIL # BLD AUTO: 0.04 K/UL — SIGNIFICANT CHANGE UP (ref 0–0.5)
EOSINOPHIL NFR BLD AUTO: 1 % — SIGNIFICANT CHANGE UP (ref 0–6)
HCT VFR BLD CALC: 34.9 % — SIGNIFICANT CHANGE UP (ref 34.5–45)
HGB BLD-MCNC: 11.1 G/DL — LOW (ref 11.5–15.5)
IMM GRANULOCYTES NFR BLD AUTO: 0.5 % — SIGNIFICANT CHANGE UP (ref 0–1.5)
LYMPHOCYTES # BLD AUTO: 1.43 K/UL — SIGNIFICANT CHANGE UP (ref 1–3.3)
LYMPHOCYTES # BLD AUTO: 36.4 % — SIGNIFICANT CHANGE UP (ref 13–44)
MCHC RBC-ENTMCNC: 27.5 PG — SIGNIFICANT CHANGE UP (ref 27–34)
MCHC RBC-ENTMCNC: 31.8 G/DL — LOW (ref 32–36)
MCV RBC AUTO: 86.6 FL — SIGNIFICANT CHANGE UP (ref 80–100)
MONOCYTES # BLD AUTO: 0.45 K/UL — SIGNIFICANT CHANGE UP (ref 0–0.9)
MONOCYTES NFR BLD AUTO: 11.5 % — SIGNIFICANT CHANGE UP (ref 2–14)
NEUTROPHILS # BLD AUTO: 1.97 K/UL — SIGNIFICANT CHANGE UP (ref 1.8–7.4)
NEUTROPHILS NFR BLD AUTO: 50.1 % — SIGNIFICANT CHANGE UP (ref 43–77)
NRBC # BLD: 0 /100 WBCS — SIGNIFICANT CHANGE UP (ref 0–0)
PLATELET # BLD AUTO: 157 K/UL — SIGNIFICANT CHANGE UP (ref 150–400)
RBC # BLD: 4.03 M/UL — SIGNIFICANT CHANGE UP (ref 3.8–5.2)
RBC # FLD: 17.7 % — HIGH (ref 10.3–14.5)
WBC # BLD: 3.93 K/UL — SIGNIFICANT CHANGE UP (ref 3.8–10.5)
WBC # FLD AUTO: 3.93 K/UL — SIGNIFICANT CHANGE UP (ref 3.8–10.5)

## 2021-11-10 PROCEDURE — 99024 POSTOP FOLLOW-UP VISIT: CPT

## 2021-11-10 NOTE — HISTORY OF PRESENT ILLNESS
[FreeTextEntry1] : Ms. Damon is a 61 year-old woman with clinical T2N1M0 HER2 positive poorly differentiated left breast invasive ductal carcinoma. She received neoadjuvant chemotherapy with TCHP followed by mastectomy showing stage IIIC, [id number].  She has now completed adjuvant radiation therapy to the left chest wall and nodes, a dose of 4,240 cGy completed on 10/4/21.\par \par She comes today for routine follow up. The patient reports feeling generally well.  She is involved in her usual activities.  She has a good appetite and denies dysphagia. She denies cough or shortness of breath.  There has been some improvement in the skin since completing radiation therapy.  She continues on Kadcyla.  She continues to have neuropathy in her fingers. She has numbness and fullness in the left lateral chest and left axilla. She started on anastrazole, reports mild joint pains in her hands. \par

## 2021-11-10 NOTE — PHYSICAL EXAM
[Sclera] : the sclera and conjunctiva were normal [] : no respiratory distress [Heart Rate And Rhythm] : heart rate and rhythm were normal [No UE Edema] : there is no upper extremity edema [Abdomen Soft] : soft [Nondistended] : nondistended [Normal] : no palpable adenopathy [Supraclavicular Lymph Nodes Enlarged Bilaterally] : supraclavicular [Axillary Lymph Nodes Enlarged Bilaterally] : axillary [de-identified] : Bilateral mastectomy incisions intact, no erythema or hyperpigmentation, minimal residual edema of left lateral breast/chest wall

## 2021-11-11 DIAGNOSIS — Z51.11 ENCOUNTER FOR ANTINEOPLASTIC CHEMOTHERAPY: ICD-10-CM

## 2021-11-11 DIAGNOSIS — R11.2 NAUSEA WITH VOMITING, UNSPECIFIED: ICD-10-CM

## 2021-11-12 ENCOUNTER — NON-APPOINTMENT (OUTPATIENT)
Age: 61
End: 2021-11-12

## 2021-12-01 ENCOUNTER — APPOINTMENT (OUTPATIENT)
Dept: HEMATOLOGY ONCOLOGY | Facility: CLINIC | Age: 61
End: 2021-12-01
Payer: COMMERCIAL

## 2021-12-01 ENCOUNTER — LABORATORY RESULT (OUTPATIENT)
Age: 61
End: 2021-12-01

## 2021-12-01 ENCOUNTER — RESULT REVIEW (OUTPATIENT)
Age: 61
End: 2021-12-01

## 2021-12-01 ENCOUNTER — APPOINTMENT (OUTPATIENT)
Dept: INFUSION THERAPY | Facility: HOSPITAL | Age: 61
End: 2021-12-01

## 2021-12-01 LAB
BASOPHILS # BLD AUTO: 0.03 K/UL — SIGNIFICANT CHANGE UP (ref 0–0.2)
BASOPHILS NFR BLD AUTO: 0.7 % — SIGNIFICANT CHANGE UP (ref 0–2)
EOSINOPHIL # BLD AUTO: 0.03 K/UL — SIGNIFICANT CHANGE UP (ref 0–0.5)
EOSINOPHIL NFR BLD AUTO: 0.7 % — SIGNIFICANT CHANGE UP (ref 0–6)
HCT VFR BLD CALC: 35.2 % — SIGNIFICANT CHANGE UP (ref 34.5–45)
HGB BLD-MCNC: 10.9 G/DL — LOW (ref 11.5–15.5)
IMM GRANULOCYTES NFR BLD AUTO: 0.5 % — SIGNIFICANT CHANGE UP (ref 0–1.5)
LYMPHOCYTES # BLD AUTO: 1.35 K/UL — SIGNIFICANT CHANGE UP (ref 1–3.3)
LYMPHOCYTES # BLD AUTO: 31.3 % — SIGNIFICANT CHANGE UP (ref 13–44)
MCHC RBC-ENTMCNC: 27.5 PG — SIGNIFICANT CHANGE UP (ref 27–34)
MCHC RBC-ENTMCNC: 31 G/DL — LOW (ref 32–36)
MCV RBC AUTO: 88.7 FL — SIGNIFICANT CHANGE UP (ref 80–100)
MONOCYTES # BLD AUTO: 0.41 K/UL — SIGNIFICANT CHANGE UP (ref 0–0.9)
MONOCYTES NFR BLD AUTO: 9.5 % — SIGNIFICANT CHANGE UP (ref 2–14)
NEUTROPHILS # BLD AUTO: 2.48 K/UL — SIGNIFICANT CHANGE UP (ref 1.8–7.4)
NEUTROPHILS NFR BLD AUTO: 57.3 % — SIGNIFICANT CHANGE UP (ref 43–77)
NRBC # BLD: 0 /100 WBCS — SIGNIFICANT CHANGE UP (ref 0–0)
PLATELET # BLD AUTO: 165 K/UL — SIGNIFICANT CHANGE UP (ref 150–400)
RBC # BLD: 3.97 M/UL — SIGNIFICANT CHANGE UP (ref 3.8–5.2)
RBC # FLD: 18.4 % — HIGH (ref 10.3–14.5)
WBC # BLD: 4.32 K/UL — SIGNIFICANT CHANGE UP (ref 3.8–10.5)
WBC # FLD AUTO: 4.32 K/UL — SIGNIFICANT CHANGE UP (ref 3.8–10.5)

## 2021-12-01 PROCEDURE — 99215 OFFICE O/P EST HI 40 MIN: CPT

## 2021-12-02 LAB — SARS-COV-2 N GENE NPH QL NAA+PROBE: NOT DETECTED

## 2021-12-07 NOTE — PHYSICAL EXAM
[Fully active, able to carry on all pre-disease performance without restriction] : Status 0 - Fully active, able to carry on all pre-disease performance without restriction [Normal] : affect appropriate [de-identified] : Port removed, picc line removed [de-identified] : BL mastectomies healing well, mild post RT changes

## 2021-12-07 NOTE — ASSESSMENT
[Curative] : Goals of care discussed with patient: Curative [FreeTextEntry1] : Ms. KELSY CRANE  is here for a follow up appt for left breast ca, dx 8/28/2020  started neoadjuvant TCHP with onpro on 9/30/2020.  She tested positive for Covid by PCR on 10/30/2020.  A repeat test at 2 weeks was positive again on 11/13/2020.  She was admitted on 11/15/2020 with port site infection.  She was treated with IV antibiotics and was discharged on Zyvox.  Subsequently Covid PCR was done x2 and was negative.  She received cycle 3 on 12/2/2020. S/p admission 12/20/2020-12/28/2020 for port infection, port removed. s/p HP #4 on 1/7 (TC held) and TCHP 4, HP 5 on 1/27. TC # 6, HP# 7 on 3/10/2021.  She continued HP every 3 weeks, HP #13 on 7/7/21. Patient underwent bilateral mastectomies with reconstruction on 7/15/2021.  Left mastectomy showed invasive ductal carcinoma, high-grade, 3.4 cm, dermal lymphovascular invasion present, anterior margin 0.2 cm, tumor involved dermis.  14/18 lymph nodes were positive for metastatic disease, largest metastatic focus measured 2.2 cm with extranodal extension. ypT2 yp N3a. Adjuvant Kadycla started 8/18/21.  Radiation completed 9/20201.  Patient developed peripheral neuropathy after 4 doses of Kadcyla and it was stopped in Nov 2021.  She restarted Herceptin Perjeta x4 cycles to finish 1 year of HP.  Anastrozole started 10/2021\par \par Breast Cancer: ER positive/HER-2/lazara positive.  Status post neoadjuvant chemotherapy with significant residual disease.  Status post bilateral mastectomy 7/2021.  Patient started Kadcyla 8/2021.  Status post 4 cycles.  She developed peripheral neuropathy, mild pancytopenia and transaminitis secondary to Kadcyla.  We discussed today to stop Kadcyla.  She is restarted on Herceptin and Perjeta.  She had previously received 13 cycles.  We will treat with 4 more cycles to complete 1 year of Herceptin Perjeta therapy.\par Ms. KELSY CRANE  is tolerating AI well, good compliance. She has mild side effects from the treatment. Continue treatment x 10 yrs due to high risk node positive disease.  We could consider addition of neratinib after HP.  We will discussed with the patient at next visit\par -Chemo induced peripheral neuropathy: Improved with discontinuation of treatment\par - At risk for chemo induced cardiomyopathy: Check ECHO Q3m to monitor for treatment induced cardiotoxicity\par -Chemo induced pancytopenia: Very mild.  Monitor counts closely.  No suspicion for marrow disease at this time but we will continue to monitor.\par - Chemo induced transaminitis: Grade 1.  Continue to monitor\par -Pulmonary nodules: 08/17/2021 PETCT ALEXX, Nonspecific few small foci of mild FDG activity adjacent to surgical clips in left axilla Will repeat PET CT after completion of HP, 2/2022\par - PICC removed\par - Concern for worsening bone density and fractures due to anastrozole. Rec to  continue calcium and vit D. DEXA 1-2 yrs.  Will order at next visit\par - High cholesterol/CAD risk factors: Concern for worsening cholesterol/CAD risk factors due to anastrozole. Pt is on simvastatin . Lipid profile annually. Life style modifications d/w her.\par -Mild aromatase inhibitors-induced arthralgias: Recommend stretching exercises.  Will consider treatment break and switching to exemestane if symptoms worsen.\par 	\par RTC every 3 weeks for Herceptin and MD Nel in 2 months.\par \par CHEMO ORDERS AND LABS ORDERED IN [place] FOR TODAY\par

## 2021-12-07 NOTE — HISTORY OF PRESENT ILLNESS
[de-identified] : Ms.Joanne Damon is a 60 year old female here for an evaluation of breast cancer. Her oncologic history is as follows:\par \par Patient noticed left breast mass for 4 months and presented to plastic surgeon's office. She was sent for breast imaging on 8/19/2020 BIRADS 5 which showed new irregular mass with calcifications and nipple retraction in the left retroareolar location on mammography, corresponding to a mass in the left breast 6:00 location 2 cm from the nipple on the concurrent breast ultrasound. Ultrasound-guided core biopsy is recommended for diagnosis.\par - Abnormal left axillary lymph nodes on mammography and sonography, including lymph nodes demonstrating microcalcifications. Ultrasound-guided core biopsy of a representative abnormal appearing left axillary lymph node with calcifications is recommended.\par - Faint loosely grouped calcifications in the upper central left breast and the upper central right breast which are indeterminate. Sampling stereotactic biopsy of both breasts is recommended.\par - Loosely grouped predominantly coarse dystrophic calcifications associated with areas of central lucency in the posterior medial right breast which are thought to represent fat necrosis. Six-month follow-up right diagnostic mammography is recommended. She underwent New irregular mass with calcifications and nipple retraction in the left retroareolar location on mammography, corresponding to a mass in the left breast 6:00 location 2 cm from the nipple on the concurrent breast ultrasound. Ultrasound-guided core biopsy is recommended for diagnosis.\par  She had a left, 6 o'clock, 2 cm FN, core biopsy on on 8/28/2020 which showed Invasive poorly differentiated ductal carcinoma with areas of micropapillary features\par - [place] score 9/9 (3 + 3 + 3)\par - Invasive tumor measures at least 1.0 cm\par - Ductal carcinoma in situ (focal), cribriform pattern with\par high nuclear grade and central necrosis\par \par  Breast, left, axillary lymph node, core biopsy\par - Invasive poorly differentiated ductal carcinoma with mild lymphocytic infiltrate\par ER+ 80%,WI+ 50%, HER 2 + breast cancer\par \par She has been on Prempro x 7 years for hot flashes. She is off now x 2 weeks. Hot flashes are ok\par She had breast augmentation with Dr Geiger. Implants exchanged 2019.  Benign biopsy in 2019\par \par 9/30/2020\par 9/25/2020 ECHO LVEF 57%\par PETCT SKUL-THI ONC  09/18/2020 No evidence of distant metastasis.\par EKG 2/2020 faxed by PCP , repeat 9/30/2020 done , Chemo consent obtained \par Right side Medport in place, catheter tip at the distal SVC\par \par 8/4/2021\par We discussed pathology report in detail today.  We reviewed that she completed chemo in March 2021.  Follow-up breast MRI showed partial response in the breast and pathologic lymph node had normalized in appearance.  She had significant delay until she finally had surgery in July. Patient underwent bilateral mastectomies with reconstruction on 7/15/2021.  Left mastectomy showed invasive ductal carcinoma, high-grade, 3.4 cm, dermal lymphovascular invasion present, anterior margin 0.2 cm, tumor involved dermis.  14/18 lymph nodes were positive for metastatic disease, largest metastatic focus measured 2.2 cm with extranodal extension. ypT2 yp N3a.  She had multiple positive lymph nodes and is at high risk for local and distant recurrence.\par We discussed that she is a candidate for postmastectomy radiation therapy.  Patient was very upset and tearful to hear that.  She said she underwent bilateral mastectomies in the hope that she will not need any further treatment.  We discussed that we will not expecting to see so many positive lymph nodes but ER positive disease also does not usually have complete response to neoadjuvant chemo.  I reviewed that typically postmastectomy radiation is considered standard of care for patients with more than 3+ lymph nodes.  She agreed to at least have a consultation with radiation oncology.  Email referral was sent.\par \par We discussed data from Bernadette study which showed benefit from TDM 1 in patients with residual disease.  I had discussed the same with the patient prior to surgery and last week over the phone.  She feels her body is okay with medications and she cannot take it anymore.  She is hoping to finish 4 more Herceptin Perjeta to finish 1 year of HER-2 therapy.  Side effects of Kadcyla were reviewed and patient was reassured that side effects are mostly very manageable.  She was again very overwhelmed and tearful and did not want to switch to TDM 1.  She does not have PICC line or port and was concerned about TDM 1x14 cycles.  She wanted to get HP today.\par \par We discussed the data for neratinib in high risk HER-2 positive patients.  If she does not agree for TDM 1, she will be a candidate for neratinib.  We discussed that even though it is a pill treatment, GI toxicity is significant.\par \par We also discussed that she will take aromatase inhibitors for 10 years after finishing radiation.\par Patient is very reluctant and hesitant to take postmastectomy radiation and Kadcyla which I believe is necessary to keep her disease-free and long-term.  I recommend that she also seek a second opinion.  She was also encouraged to participate in clinical trials for adjuvant HER-2 positive breast cancer, if offered by second opinion oncologist.\par \par Addendum: Patient went to treatment room but then refused treatment with HP.  We will check with her in a week and reschedule either HP or TDM 1 depending on her decision.\par \par 8/18/21\par Patient here to start Kadcyla (TDM 1) x14 cycles q 3 weeks tx #1/14 today Counts good\par Patient expressed concern about hair loss with Kadcyla I explained that hair loss is usually not seen with Kadcyla chemo s/e discussed\par Medication for symptom management reviewed and questions answered\par Discussed 08/17/2021 PETCT ALEXX, Nonspecific few small foci of mild FDG activity adjacent to surgical clips in left axilla Will repeat PET CT after completion of TDM1 x 14 cycles \par Last ECHO 5/24/21 EF 58%. Reminded her to schedule next ECHO  this month.\par Chemo consent obtained she is scheduled for PICC line insertion \par Patient is a anxious about PICC line, will give Xanax for use pre procedure\par Emotional support given\par \par 10/2021\par Patient completed radiation therapy 2 weeks ago.  He has mild fatigue and skin reaction.  Otherwise overall doing well.  She reports mild numbness and tingling occasionally in her fingertips and toes.  We discussed possibility of Kadcyla induced neuropathy.  No pain.  The symptoms are inconsistent and are not affecting her quality of life at this point therefore we will continue Kadcyla but monitor her closely.\par We discussed to start endocrine therapy with anastrozole.  Side effects were reviewed.\par \par \par 11/2021\par peripheral neuropathy worse\par likely 2/2 kadcyla\par Rec to stop kadcyla at this point \par She had 13 cycles oh HP, will continue 4 more to finish 1 year\par She has mild stiffness in fingers. Continue AI for now\par Monitor closely. If worsen, will switch to exemestane. \par  [de-identified] : Ms. KELSY CRANE  is here for a follow up appt for left breast ca, dx 8/28/2020  started neoadjuvant TCHP with onpro on 9/30/2020.  She tested positive for Covid by PCR on 10/30/2020.  A repeat test at 2 weeks was positive again on 11/13/2020.  She was admitted on 11/15/2020 with port site infection.  She was treated with IV antibiotics and was discharged on Zyvox.  Subsequently Covid PCR was done x2 and was negative.  She received cycle 3 on 12/2/2020. S/p admission 12/20/2020-12/28/2020 for port infection, port removed. s/p HP #4 on 1/7 (TC held) and TCHP 4, HP 5 on 1/27. TC # 6, HP# 7 on 3/10/2021.  She continued HP every 3 weeks, HP #13 on 7/7/21. Patient underwent bilateral mastectomies with reconstruction on 7/15/2021.  Left mastectomy showed invasive ductal carcinoma, high-grade, 3.4 cm, dermal lymphovascular invasion present, anterior margin 0.2 cm, tumor involved dermis.  14/18 lymph nodes were positive for metastatic disease, largest metastatic focus measured 2.2 cm with extranodal extension. ypT2 yp N3a. Adjuvant Kadycla started 8/18/21.  Radiation completed 9/20201.  Patient developed peripheral neuropathy after 4 doses of Kadcyla and it was stopped in Nov 2021.  She restarted Herceptin Perjeta x4 cycles to finish 1 year of HP.  Anastrozole started 10/2021\par \par Takes Anastrozole daily, good compliance.  She has mild stiffness in her fingers.  She is using Voltaren gel with relief.  She denies aches/pain, hot flashes, vag dryness, excessive fatigue, GI s/e, hair loss. She is active, no change in energy, wt or appetite. \par mammogram -   not needed\par Neuropathy has improved since stopping Kadcyla.\par DEXA- not done. She takes ca+vit D\par echo 9/2021\par PICC line removed\par 08/17/2021 PETCT ALEXX, Nonspecific few small foci of mild FDG activity adjacent to surgical clips in left axilla.  We plan to repeat PET/CT after completion of HP.  Will order at next visit in Feb 2022\par She had mild pancytopenia and transaminitis 2/2 kadcyla: We will monitor

## 2021-12-07 NOTE — REASON FOR VISIT
[Follow-Up Visit] : a follow-up [Other: _____] : [unfilled] [FreeTextEntry2] : ER+ 80%,NH+ 50%, HER 2 + breast cancer No

## 2021-12-20 ENCOUNTER — OUTPATIENT (OUTPATIENT)
Dept: OUTPATIENT SERVICES | Facility: HOSPITAL | Age: 61
LOS: 1 days | Discharge: HOME | End: 2021-12-20

## 2021-12-20 DIAGNOSIS — C50.919 MALIGNANT NEOPLASM OF UNSPECIFIED SITE OF UNSPECIFIED FEMALE BREAST: ICD-10-CM

## 2021-12-20 DIAGNOSIS — Z86.39 PERSONAL HISTORY OF OTHER ENDOCRINE, NUTRITIONAL AND METABOLIC DISEASE: Chronic | ICD-10-CM

## 2021-12-20 DIAGNOSIS — Z98.890 OTHER SPECIFIED POSTPROCEDURAL STATES: Chronic | ICD-10-CM

## 2021-12-22 ENCOUNTER — APPOINTMENT (OUTPATIENT)
Dept: INFUSION THERAPY | Facility: HOSPITAL | Age: 61
End: 2021-12-22

## 2021-12-22 DIAGNOSIS — R11.2 NAUSEA WITH VOMITING, UNSPECIFIED: ICD-10-CM

## 2021-12-22 DIAGNOSIS — Z51.11 ENCOUNTER FOR ANTINEOPLASTIC CHEMOTHERAPY: ICD-10-CM

## 2021-12-27 NOTE — HISTORY OF PRESENT ILLNESS
[FreeTextEntry1] : 60 y.o.  F history of left breast CA (ER+, WI+, HER2+) with axillary lymph node involvement diagnosed 2020 s/p completing chemo 2021; and most recently s/p bilateral breast reconstruction with [place] flaps on 7/15/21\par \par Other PMHx significant for hyperlipidema controlled on simvastatin. Pt has history of COVID infection in 10/2020; she also has history of port site infection for which she was hospitalized for in 2020. \par \par  Family history significant for mother diagnosed with cervical cancer age 21 and lung CA early 70's, paternal cousin with breast CA diagnosed unknown age, father with stomach cancer. \par \par Patient has history of b/l breast augmentation in  s/p removal of b/l silicone implants with mastopexy with Dr. Geiger in . \par

## 2021-12-27 NOTE — PHYSICAL EXAM
[de-identified] : b/l breasts soft, nt incisions healing well, no collection or infection. [de-identified] : abdomen soft, nt. incisions healing well. no collection or evidence of infection. VANDANA drain held to suction with minimal SS output. VANDANA drain dc'ed and dressing placed with bacitracin + gauze dressing. [de-identified] : reach for recovery exercises performed, good ROM.

## 2021-12-29 ENCOUNTER — APPOINTMENT (OUTPATIENT)
Dept: CARDIOLOGY | Facility: CLINIC | Age: 61
End: 2021-12-29
Payer: COMMERCIAL

## 2021-12-29 PROCEDURE — 93306 TTE W/DOPPLER COMPLETE: CPT

## 2022-01-12 ENCOUNTER — RESULT REVIEW (OUTPATIENT)
Age: 62
End: 2022-01-12

## 2022-01-12 ENCOUNTER — APPOINTMENT (OUTPATIENT)
Dept: INFUSION THERAPY | Facility: HOSPITAL | Age: 62
End: 2022-01-12

## 2022-01-12 LAB — SARS-COV-2 RNA SPEC QL NAA+PROBE: SIGNIFICANT CHANGE UP

## 2022-01-21 ENCOUNTER — NON-APPOINTMENT (OUTPATIENT)
Age: 62
End: 2022-01-21

## 2022-01-25 ENCOUNTER — APPOINTMENT (OUTPATIENT)
Dept: SURGICAL ONCOLOGY | Facility: CLINIC | Age: 62
End: 2022-01-25
Payer: COMMERCIAL

## 2022-01-25 VITALS
OXYGEN SATURATION: 98 % | HEIGHT: 62 IN | TEMPERATURE: 97.6 F | BODY MASS INDEX: 27.42 KG/M2 | SYSTOLIC BLOOD PRESSURE: 120 MMHG | WEIGHT: 149 LBS | DIASTOLIC BLOOD PRESSURE: 77 MMHG | HEART RATE: 73 BPM | RESPIRATION RATE: 16 BRPM

## 2022-01-25 PROCEDURE — 99214 OFFICE O/P EST MOD 30 MIN: CPT

## 2022-01-25 NOTE — ASSESSMENT
[FreeTextEntry1] : IMP:\par 60 y/o female diagnosed with left breast Invasive poorly differentiated ductal carcinoma and DCIS (ER/VA/HER2+) with mets to left axillary lymph node; s/p neoadjuvant chemotherapy\par Now s/p bilateral total mastectomies, bilateral axillary sentinel lymph node biopsies, left axillary node dissection on 7/15/21.\par Final Pathology revealed:\par 1) right axillary sentinel lymph nodes negative for carcinoma. 0/2\par 2) right breast simple mastectomy- minute focus of ADH. \par 3) left breast simple mastectomy- 3.4 cm invasive ductal carcinoma with focal mucinous and micropapillary features. Dermal lymphovascular invasion present. ADH, negative margins. ER/VA (+) HER2 (+)\par 4) left axilla sentinel lymph biopsy- 3/3 lymph nodes positive for metastatic carcinoma. Extra sherry extension is present. \par 5) right Internal mammary lymph nodes- negative for carcinoma. \par 6)Left Additional axillary contents dissection -11/14 positive for metastatic carcinoma, extranodal extension is present. \par 7) left internal mammary lymph node negative for carcinoma. 0/1\par Tumor stage: pT2 pN3a;  Stage 3 \par MADISON reconstruction - Dr. Levi/Dr. Tai\par On Herceptin and Perjeta under the care of Dr. Bell. - scheduled to complete next week\par Will remain on Anastrozole for 10 years \par \par PLAN:\par Treatment and imaging as per Dr. Bell (Med-onc)\par RTO in 1 year \par

## 2022-01-25 NOTE — PHYSICAL EXAM
[Normal] : supple, no neck mass and thyroid not enlarged [Normal Supraclavicular Lymph Nodes] : normal supraclavicular lymph nodes [Normal Axillary Lymph Nodes] : normal axillary lymph nodes [Normal] : oriented to person, place and time, with appropriate affect [FreeTextEntry1] : VANDANA present during exam  [de-identified] : s/p bilateral mastectomies with MADISON reconstruction; no palpable chest wall masses

## 2022-01-25 NOTE — HISTORY OF PRESENT ILLNESS
[de-identified] : Ms. Jazlyn Damon is a 62 y/o female presenting for a follow visit s/p bilateral total mastectomies, bilateral axillary sentinel lymph node biopsies, left axillary node dissection on 7/15/21. MADISON reconstruction by Dr. Levi/Dr. Tai. \par Final Pathology revealed:\par 1) right axillary sentinel lymph nodes negative for carcinoma. 0/2\par 2) right breast simple mastectomy- minute focus of ADH. \par 3) left breast simple mastectomy- 3.4 cm invasive ductal carcinoma with focal mucinous and micropapillary features. Dermal lymphovascular invasion present. ADH, negative margins. ER/[place] (+) HER2 (+)\par 4) left axilla sentinel lymph biopsy- 3/3 lymph nodes positive for metastatic carcinoma. Extra sherry extension is present. \par 5) right Internal mammary lymph nodes- negative for carcinoma. \par 6)Left Additional axillary contents dissection -11/14 positive for metastatic carcinoma, extranodal extension is present. \par 7) left internal mammary lymph node negative for carcinoma. 0/1\par Tumor stage: pT2 pN3a;  Stage 3 \par \par 8/4/21- Denies pain, fever or chills. Doing well. \par \par She underwent a PET/CT on 8/17/21 which revealed 1. Status post interval bilateral mastectomy with flap reconstruction and left axillary dissection. Diffuse, heterogeneous, mild FDG activity in periphery of bilateral reconstructed breasts probably is related to recent surgery. Few small foci of mild FDG activity adjacent to surgical clips in left axilla are nonspecific. A 3-6 month follow-up FDG-PET/CT may be performed for further evaluation.\par \par Pt Completed radiation in September 2021, Ghulamdcylthi was d/c on 11/2021 due to neuropathy and restarted on Herceptin and Perjeta, started on Anastrazole in 10/2021.   She is scheduled to complete Herceptin and Perjeta next week and will remain on Anastrozole for 10 years.  Remains under the care of Dr. Bell.\par \par 1/25/2022-  Pt. is with c/o numbness and tenderness to the left reconstructed breast.  Doing well otherwise. She is excited to finish up chemotherapy next week.  Denies fever or chills. \par \par \par PERTINENT HISTORY:\par She was seen in initial consultation on 9/2020 for newly diagnosed left breast cancer.  The patient reportedly felt a lump in her left breast for 3 to 4 months and presented to plastic surgeons office. She has no personal history of cancer. She has a history of implant(s) removed in 2019 (Dr. Geiger) and left stereotactic guided biopsy in 2019 - benign. She has been on Prempro x 7 years for hot flashes. \par \par MMG/Sono on 8/19/2020: IMPRESSION:\par - New irregular mass with calcifications and nipple retraction in the left retroareolar location on mammography, corresponding to a mass in the left breast 6:00 location 2 cm from the nipple on the concurrent breast ultrasound. Ultrasound-guided core biopsy is recommended for diagnosis.\par - Abnormal left axillary lymph nodes on mammography and sonography, including lymph nodes demonstrating microcalcifications. Ultrasound-guided core biopsy of a representative abnormal appearing left axillary lymph node with calcifications is recommended.\par - Faint loosely grouped calcifications in the upper central left breast and the upper central right breast which are indeterminate. Sampling stereotactic biopsy of both breasts is recommended.\par - Loosely grouped predominantly coarse dystrophic calcifications associated with areas of central lucency in the posterior medial right breast which are thought to represent fat necrosis. Six-month follow-up right diagnostic mammography is recommended.\par BI-RADS 5\par \par 8/28/2020 : FN core biopsy:\par 1. Breast, left, 6 o'clock, 2 cm FN- Invasive poorly differentiated ductal carcinoma with areas of micropapillary features.  Ductal carcinoma in situ (focal), cribriform pattern with high nuclear grade and central necrosis\par - Lymphovascular permeation by tumor not seen\par -ER/[place]/HER2 (+)\par \par 2. Breast, left, axillary lymph node, core biopsy\par - Invasive poorly differentiated ductal carcinoma with mild lymphocytic infiltrate\par \par MRI Breast 9/8/2020- Biopsy proven malignancy in the left retroareolar breast with involvement of the nipple areolar complex. Left axillary lymphadenopathy. Nonmass enhancement in the lower outer right breast for which MRI guided biopsy is recommended. BIRADS 4B. \par \par The patient is also s/p MRI guided core needle biopsy of the right breast on 9/16/2020 with the following pathology: \par -Right breast lower outer quadrant MRI guided bx:  Focal Atypical Duct Hyperplasia, Stromal fibrosis and adenosis\par -Results are concordant. \par \par PET 9/18/2020- Hypermetabolic left retroareolar breast mass and left axillary lymph nodes, corresponding to biopsy proven breast carcinoma and axillary lymph node metastases. No evidence of distant metastases. \par \par She had a right mediport placed for chemotherapy on 9/24/2020 Pt. started neoadjuvant TCHP with onpro on 9/30/2020. She tested positive for Covid by PCR on 10/30/2020. A repeat test at 2 weeks was positive again on 11/13/2020. She was admitted on 11/15/2020 with port site infection. She was treated with IV antibiotics and was discharged on Zyvox. Subsequently Covid PCR was done x2 and was negative. She received cycle 3 on 12/2/2020. S/p admission 12/20/2020-12/28/2020 for port infection, port removed. s/p HP #4 on 1/7 (TC held) and TCHP 4, HP 4 on 1/27. TCHP #5 2/17/21. TCHP # 6 on 3/10/21. She continues Herceptin under the care of Dr. Cassie Bell. \par \par INVITAE Genetic Testing 4/28/2021-  NEGATIVE \par \par MRI Breast to evaluate response to neoadjuvant chemotherapy performed on 4/10/2021 showed partial response to neoadjuvant chemotherapy.  The left breast mass is slightly decreased in size.  The left axillary lymph nodes have decrease in size, and now demonstrate a normal imaging appearance.  This includes the biopsy proven malignant lymph nodes in the lower left axilla which contains susceptibility artifact from a biopsy marker.  BIRADS 6. \par \par Patient presents to the office today to discuss surgical options.  Doing well. \par \par Referring physician: Dr. Geiger\par Heme-onc: Dr. Cassie Bell

## 2022-01-29 ENCOUNTER — OUTPATIENT (OUTPATIENT)
Dept: OUTPATIENT SERVICES | Facility: HOSPITAL | Age: 62
LOS: 1 days | Discharge: HOME | End: 2022-01-29

## 2022-01-29 DIAGNOSIS — C50.919 MALIGNANT NEOPLASM OF UNSPECIFIED SITE OF UNSPECIFIED FEMALE BREAST: ICD-10-CM

## 2022-01-29 DIAGNOSIS — Z98.890 OTHER SPECIFIED POSTPROCEDURAL STATES: Chronic | ICD-10-CM

## 2022-01-29 DIAGNOSIS — Z86.39 PERSONAL HISTORY OF OTHER ENDOCRINE, NUTRITIONAL AND METABOLIC DISEASE: Chronic | ICD-10-CM

## 2022-02-02 ENCOUNTER — RESULT REVIEW (OUTPATIENT)
Age: 62
End: 2022-02-02

## 2022-02-02 ENCOUNTER — APPOINTMENT (OUTPATIENT)
Dept: INFUSION THERAPY | Facility: HOSPITAL | Age: 62
End: 2022-02-02

## 2022-02-02 ENCOUNTER — NON-APPOINTMENT (OUTPATIENT)
Age: 62
End: 2022-02-02

## 2022-02-02 ENCOUNTER — APPOINTMENT (OUTPATIENT)
Dept: HEMATOLOGY ONCOLOGY | Facility: CLINIC | Age: 62
End: 2022-02-02
Payer: COMMERCIAL

## 2022-02-02 VITALS
OXYGEN SATURATION: 97 % | SYSTOLIC BLOOD PRESSURE: 120 MMHG | WEIGHT: 147.71 LBS | HEART RATE: 90 BPM | DIASTOLIC BLOOD PRESSURE: 79 MMHG | RESPIRATION RATE: 16 BRPM | TEMPERATURE: 97.2 F | BODY MASS INDEX: 27.18 KG/M2 | HEIGHT: 61.97 IN

## 2022-02-02 DIAGNOSIS — D64.81 ANEMIA DUE TO ANTINEOPLASTIC CHEMOTHERAPY: ICD-10-CM

## 2022-02-02 DIAGNOSIS — T45.1X5A ANEMIA DUE TO ANTINEOPLASTIC CHEMOTHERAPY: ICD-10-CM

## 2022-02-02 DIAGNOSIS — E83.42 HYPOMAGNESEMIA: ICD-10-CM

## 2022-02-02 DIAGNOSIS — Z51.11 ENCOUNTER FOR ANTINEOPLASTIC CHEMOTHERAPY: ICD-10-CM

## 2022-02-02 DIAGNOSIS — Z13.71 ENCOUNTER FOR NONPROCREATIVE SCREENING FOR GENETIC DISEASE CARRIER STATUS: ICD-10-CM

## 2022-02-02 LAB
ALBUMIN SERPL ELPH-MCNC: 4.4 G/DL — SIGNIFICANT CHANGE UP (ref 3.3–5)
ALP SERPL-CCNC: 67 U/L — SIGNIFICANT CHANGE UP (ref 40–120)
ALT FLD-CCNC: 22 U/L — SIGNIFICANT CHANGE UP (ref 10–45)
ANION GAP SERPL CALC-SCNC: 13 MMOL/L — SIGNIFICANT CHANGE UP (ref 5–17)
AST SERPL-CCNC: 26 U/L — SIGNIFICANT CHANGE UP (ref 10–40)
BASOPHILS # BLD AUTO: 0.03 K/UL — SIGNIFICANT CHANGE UP (ref 0–0.2)
BASOPHILS NFR BLD AUTO: 0.6 % — SIGNIFICANT CHANGE UP (ref 0–2)
BILIRUB SERPL-MCNC: 0.2 MG/DL — SIGNIFICANT CHANGE UP (ref 0.2–1.2)
BUN SERPL-MCNC: 15 MG/DL — SIGNIFICANT CHANGE UP (ref 7–23)
CALCIUM SERPL-MCNC: 9.6 MG/DL — SIGNIFICANT CHANGE UP (ref 8.4–10.5)
CEA SERPL-MCNC: 1.4 NG/ML — SIGNIFICANT CHANGE UP (ref 0–3.8)
CHLORIDE SERPL-SCNC: 107 MMOL/L — SIGNIFICANT CHANGE UP (ref 96–108)
CO2 SERPL-SCNC: 23 MMOL/L — SIGNIFICANT CHANGE UP (ref 22–31)
CREAT SERPL-MCNC: 0.71 MG/DL — SIGNIFICANT CHANGE UP (ref 0.5–1.3)
EOSINOPHIL # BLD AUTO: 0.03 K/UL — SIGNIFICANT CHANGE UP (ref 0–0.5)
EOSINOPHIL NFR BLD AUTO: 0.6 % — SIGNIFICANT CHANGE UP (ref 0–6)
GLUCOSE SERPL-MCNC: 101 MG/DL — HIGH (ref 70–99)
HCT VFR BLD CALC: 35.2 % — SIGNIFICANT CHANGE UP (ref 34.5–45)
HGB BLD-MCNC: 11.2 G/DL — LOW (ref 11.5–15.5)
IMM GRANULOCYTES NFR BLD AUTO: 0.2 % — SIGNIFICANT CHANGE UP (ref 0–1.5)
INR BLD: 0.95 RATIO — SIGNIFICANT CHANGE UP (ref 0.88–1.16)
LYMPHOCYTES # BLD AUTO: 1.09 K/UL — SIGNIFICANT CHANGE UP (ref 1–3.3)
LYMPHOCYTES # BLD AUTO: 22.2 % — SIGNIFICANT CHANGE UP (ref 13–44)
MAGNESIUM SERPL-MCNC: 1.6 MG/DL — SIGNIFICANT CHANGE UP (ref 1.6–2.6)
MCHC RBC-ENTMCNC: 29.3 PG — SIGNIFICANT CHANGE UP (ref 27–34)
MCHC RBC-ENTMCNC: 31.8 G/DL — LOW (ref 32–36)
MCV RBC AUTO: 92.1 FL — SIGNIFICANT CHANGE UP (ref 80–100)
MONOCYTES # BLD AUTO: 0.55 K/UL — SIGNIFICANT CHANGE UP (ref 0–0.9)
MONOCYTES NFR BLD AUTO: 11.2 % — SIGNIFICANT CHANGE UP (ref 2–14)
NEUTROPHILS # BLD AUTO: 3.21 K/UL — SIGNIFICANT CHANGE UP (ref 1.8–7.4)
NEUTROPHILS NFR BLD AUTO: 65.2 % — SIGNIFICANT CHANGE UP (ref 43–77)
NRBC # BLD: 0 /100 WBCS — SIGNIFICANT CHANGE UP (ref 0–0)
PLATELET # BLD AUTO: 196 K/UL — SIGNIFICANT CHANGE UP (ref 150–400)
POTASSIUM SERPL-MCNC: 4.4 MMOL/L — SIGNIFICANT CHANGE UP (ref 3.5–5.3)
POTASSIUM SERPL-SCNC: 4.4 MMOL/L — SIGNIFICANT CHANGE UP (ref 3.5–5.3)
PROT SERPL-MCNC: 7.2 G/DL — SIGNIFICANT CHANGE UP (ref 6–8.3)
PROTHROM AB SERPL-ACNC: 11.3 SEC — SIGNIFICANT CHANGE UP (ref 10.6–13.6)
RBC # BLD: 3.82 M/UL — SIGNIFICANT CHANGE UP (ref 3.8–5.2)
RBC # FLD: 16.2 % — HIGH (ref 10.3–14.5)
SODIUM SERPL-SCNC: 142 MMOL/L — SIGNIFICANT CHANGE UP (ref 135–145)
WBC # BLD: 4.92 K/UL — SIGNIFICANT CHANGE UP (ref 3.8–10.5)
WBC # FLD AUTO: 4.92 K/UL — SIGNIFICANT CHANGE UP (ref 3.8–10.5)

## 2022-02-02 PROCEDURE — 99215 OFFICE O/P EST HI 40 MIN: CPT

## 2022-02-02 NOTE — HISTORY OF PRESENT ILLNESS
[de-identified] : Ms.Joanne Damon is a 60 year old female here for an evaluation of breast cancer. Her oncologic history is as follows:\par \par Patient noticed left breast mass for 4 months and presented to plastic surgeon's office. She was sent for breast imaging on 8/19/2020 BIRADS 5 which showed new irregular mass with calcifications and nipple retraction in the left retroareolar location on mammography, corresponding to a mass in the left breast 6:00 location 2 cm from the nipple on the concurrent breast ultrasound. Ultrasound-guided core biopsy is recommended for diagnosis.\par - Abnormal left axillary lymph nodes on mammography and sonography, including lymph nodes demonstrating microcalcifications. Ultrasound-guided core biopsy of a representative abnormal appearing left axillary lymph node with calcifications is recommended.\par - Faint loosely grouped calcifications in the upper central left breast and the upper central right breast which are indeterminate. Sampling stereotactic biopsy of both breasts is recommended.\par - Loosely grouped predominantly coarse dystrophic calcifications associated with areas of central lucency in the posterior medial right breast which are thought to represent fat necrosis. Six-month follow-up right diagnostic mammography is recommended. She underwent New irregular mass with calcifications and nipple retraction in the left retroareolar location on mammography, corresponding to a mass in the left breast 6:00 location 2 cm from the nipple on the concurrent breast ultrasound. Ultrasound-guided core biopsy is recommended for diagnosis.\par  She had a left, 6 o'clock, 2 cm FN, core biopsy on on 8/28/2020 which showed Invasive poorly differentiated ductal carcinoma with areas of micropapillary features\par - Bringhurst score 9/9 (3 + 3 + 3)\par - Invasive tumor measures at least 1.0 cm\par - Ductal carcinoma in situ (focal), cribriform pattern with\par high nuclear grade and central necrosis\par \par  Breast, left, axillary lymph node, core biopsy\par - Invasive poorly differentiated ductal carcinoma with mild lymphocytic infiltrate\par ER+ 80%,[place]+ 50%, HER 2 + breast cancer\par \par She has been on Prempro x 7 years for hot flashes. She is off now x 2 weeks. Hot flashes are ok\par She had breast augmentation with Dr Geiger. Implants exchanged 2019.  Benign biopsy in 2019\par \par 9/30/2020\par 9/25/2020 ECHO LVEF 57%\par PETCT SKUL-THI ONC  09/18/2020 No evidence of distant metastasis.\par EKG 2/2020 faxed by PCP , repeat 9/30/2020 done , Chemo consent obtained \par Right side Medport in place, catheter tip at the distal SVC\par \par 8/4/2021\par We discussed pathology report in detail today.  We reviewed that she completed chemo in March 2021.  Follow-up breast MRI showed partial response in the breast and pathologic lymph node had normalized in appearance.  She had significant delay until she finally had surgery in July. Patient underwent bilateral mastectomies with reconstruction on 7/15/2021.  Left mastectomy showed invasive ductal carcinoma, high-grade, 3.4 cm, dermal lymphovascular invasion present, anterior margin 0.2 cm, tumor involved dermis.  14/18 lymph nodes were positive for metastatic disease, largest metastatic focus measured 2.2 cm with extranodal extension. ypT2 yp N3a.  She had multiple positive lymph nodes and is at high risk for local and distant recurrence.\par We discussed that she is a candidate for postmastectomy radiation therapy.  Patient was very upset and tearful to hear that.  She said she underwent bilateral mastectomies in the hope that she will not need any further treatment.  We discussed that we will not expecting to see so many positive lymph nodes but ER positive disease also does not usually have complete response to neoadjuvant chemo.  I reviewed that typically postmastectomy radiation is considered standard of care for patients with more than 3+ lymph nodes.  She agreed to at least have a consultation with radiation oncology.  Email referral was sent.\par \par We discussed data from Bernadette study which showed benefit from TDM 1 in patients with residual disease.  I had discussed the same with the patient prior to surgery and last week over the phone.  She feels her body is okay with medications and she cannot take it anymore.  She is hoping to finish 4 more Herceptin Perjeta to finish 1 year of HER-2 therapy.  Side effects of Kadcyla were reviewed and patient was reassured that side effects are mostly very manageable.  She was again very overwhelmed and tearful and did not want to switch to TDM 1.  She does not have PICC line or port and was concerned about TDM 1x14 cycles.  She wanted to get HP today.\par \par We discussed the data for neratinib in high risk HER-2 positive patients.  If she does not agree for TDM 1, she will be a candidate for neratinib.  We discussed that even though it is a pill treatment, GI toxicity is significant.\par \par We also discussed that she will take aromatase inhibitors for 10 years after finishing radiation.\par Patient is very reluctant and hesitant to take postmastectomy radiation and Kadcyla which I believe is necessary to keep her disease-free and long-term.  I recommend that she also seek a second opinion.  She was also encouraged to participate in clinical trials for adjuvant HER-2 positive breast cancer, if offered by second opinion oncologist.\par \par Addendum: Patient went to treatment room but then refused treatment with HP.  We will check with her in a week and reschedule either HP or TDM 1 depending on her decision.\par \par 8/18/21\par Patient here to start Kadcyla (TDM 1) x14 cycles q 3 weeks tx #1/14 today Counts good\par Patient expressed concern about hair loss with Kadcyla I explained that hair loss is usually not seen with Kadcyla chemo s/e discussed\par Medication for symptom management reviewed and questions answered\par Discussed 08/17/2021 PETCT ALEXX, Nonspecific few small foci of mild FDG activity adjacent to surgical clips in left axilla Will repeat PET CT after completion of TDM1 x 14 cycles \par Last ECHO 5/24/21 EF 58%. Reminded her to schedule next ECHO  this month.\par Chemo consent obtained she is scheduled for PICC line insertion \par Patient is a anxious about PICC line, will give Xanax for use pre procedure\par Emotional support given\par \par 10/2021\par Patient completed radiation therapy 2 weeks ago.  He has mild fatigue and skin reaction.  Otherwise overall doing well.  She reports mild numbness and tingling occasionally in her fingertips and toes.  We discussed possibility of Kadcyla induced neuropathy.  No pain.  The symptoms are inconsistent and are not affecting her quality of life at this point therefore we will continue Kadcyla but monitor her closely.\par We discussed to start endocrine therapy with anastrozole.  Side effects were reviewed.\par \par \par 11/2021\par peripheral neuropathy worse\par likely 2/2 kadcyla\par Rec to stop kadcyla at this point \par She had 13 cycles oh HP, will continue 4 more to finish 1 year\par She has mild stiffness in fingers. Continue AI for now\par Monitor closely. If worsen, will switch to exemestane. \par  [de-identified] : Ms. KELSY CRANE  is here for a follow up appt for left breast ca, dx 8/28/2020  started neoadjuvant TCHP with onpro on 9/30/2020.  She tested positive for Covid by PCR on 10/30/2020.  A repeat test at 2 weeks was positive again on 11/13/2020.  She was admitted on 11/15/2020 with port site infection.  She was treated with IV antibiotics and was discharged on Zyvox.  Subsequently Covid PCR was done x2 and was negative.  She received cycle 3 on 12/2/2020. S/p admission 12/20/2020-12/28/2020 for port infection, port removed. s/p HP #4 on 1/7 (TC held) and TCHP 4, HP 5 on 1/27. TC # 6, HP# 7 on 3/10/2021.  She continued HP every 3 weeks, HP #13 on 7/7/21. Patient underwent bilateral mastectomies with reconstruction on 7/15/2021.  Left mastectomy showed invasive ductal carcinoma, high-grade, 3.4 cm, dermal lymphovascular invasion present, anterior margin 0.2 cm, tumor involved dermis.  14/18 lymph nodes were positive for metastatic disease, largest metastatic focus measured 2.2 cm with extranodal extension. ypT2 yp N3a. Adjuvant Kadycla started 8/18/21.  Radiation completed 9/20201.  Patient developed peripheral neuropathy after 4 doses of Kadcyla and it was stopped in Nov 2021.  She restarted Herceptin Perjeta x4 cycles to finish 1 year of HP.  Anastrozole started 10/2021\par \par herceptin perjeta 17/17. Insurance changed herceptin to kanjinti \par Takes Anastrozole daily, good compliance.  She has mild stiffness in her fingers.  She is using Voltaren gel with relief.  She denies aches/pain, hot flashes, vag dryness, excessive fatigue, GI s/e, hair loss. She is active, no change in energy, wt or appetite. \par mammogram -   not needed\par Neuropathy has improved since stopping Kadcyla.\par no lymphedema, excellent ROM, doing jair\par DEXA- not done. She takes ca+vit D\par echo 9/2021, 12/2021-repeat in 1 year 12/2022\par PICC line will be removed tonite after last chemo\par 08/17/2021 PETCT ALEXX, Nonspecific few small foci of mild FDG activity adjacent to surgical clips in left axilla.  We plan to repeat PET/CT after completion of HP.  PET/CT ordered today\par She had mild pancytopenia and transaminitis 2/2 kadcyla: We will monitor\par Patient has high risk HER-2 positive breast cancer.  Patient was not able to get Kadcyla therefore we discussed neratinib.  She will follow-up with me in 1 to 2 months.  If patient is agreeable, we will start neratinib.

## 2022-02-02 NOTE — PHYSICAL EXAM
[Fully active, able to carry on all pre-disease performance without restriction] : Status 0 - Fully active, able to carry on all pre-disease performance without restriction [Normal] : affect appropriate [de-identified] : Port removed, picc line removed [de-identified] : BL mastectomies healing well, mild post RT changes

## 2022-02-02 NOTE — REASON FOR VISIT
[Follow-Up Visit] : a follow-up [Other: _____] : [unfilled] [FreeTextEntry2] : ER+ 80%,[place]+ 50%, HER 2 + breast cancer

## 2022-02-02 NOTE — ASSESSMENT
[Curative] : Goals of care discussed with patient: Curative [FreeTextEntry1] : Ms. KELSY CRANE  is here for a follow up appt for left breast ca, dx 8/28/2020  started neoadjuvant TCHP with onpro on 9/30/2020.  She tested positive for Covid by PCR on 10/30/2020.  A repeat test at 2 weeks was positive again on 11/13/2020.  She was admitted on 11/15/2020 with port site infection.  She was treated with IV antibiotics and was discharged on Zyvox.  Subsequently Covid PCR was done x2 and was negative.  She received cycle 3 on 12/2/2020. S/p admission 12/20/2020-12/28/2020 for port infection, port removed. s/p HP #4 on 1/7 (TC held) and TCHP 4, HP 5 on 1/27. TC # 6, HP# 7 on 3/10/2021.  She continued HP every 3 weeks, HP #13 on 7/7/21. Patient underwent bilateral mastectomies with reconstruction on 7/15/2021.  Left mastectomy showed invasive ductal carcinoma, high-grade, 3.4 cm, dermal lymphovascular invasion present, anterior margin 0.2 cm, tumor involved dermis.  14/18 lymph nodes were positive for metastatic disease, largest metastatic focus measured 2.2 cm with extranodal extension. ypT2 yp N3a. Adjuvant Kadycla started 8/18/21.  Radiation completed 9/20201.  Patient developed peripheral neuropathy after 4 doses of Kadcyla and it was stopped in Nov 2021.  She restarted Herceptin Perjeta x4 cycles to finish 1 year of HP.  Anastrozole started 10/2021\par \par BREAST CANCER: ER POSITIVE/HER-2/USHA POSITIVE.  Status post neoadjuvant chemotherapy with significant residual disease.  Status post bilateral mastectomy 7/2021.  Patient started Kadcyla 8/2021.  Status post 4 cycles.  She developed peripheral neuropathy, mild pancytopenia and transaminitis secondary to Kadcyla.  We discussed today to stop Kadcyla.  She is restarted on Herceptin and Perjeta.  She had previously received 13 cycles.  We will treat with 4 more cycles to complete 1 year of Herceptin Perjeta therapy.\par Last Herceptin today\par Patient has high risk node positive, HER-2 positive breast cancer.  Patient was not able to get Kadcyla therefore we discussed neratinib.  She will follow-up with me in 1 to 2 months.  If patient is agreeable, we will start neratinib.\par \par Ms. KELSY CRANE  is tolerating AI well, good compliance. She has mild side effects from the treatment. Continue endocrine x 10 yrs due to high risk node positive disease.  We could consider addition of neratinib after HP. \par -CHEMO INDUCED PERIPHERAL NEUROPATHY: Improved with discontinuation of treatment\par - AT RISK FOR CHEMO INDUCED CARDIOMYOPATHY: Check ECHO Q3m to monitor for treatment induced cardiotoxicity.  Next echo due in 1 year 12/2022\par -CHEMO INDUCED PANCYTOPENIA: Very mild.  Monitor counts closely.  No suspicion for marrow disease at this time but we will continue to monitor.\par - Chemo induced transaminitis: Grade 1.  Continue to monitor\par -Pulmonary nodules: 08/17/2021 PETCT ALEXX, Nonspecific few small foci of mild FDG activity adjacent to surgical clips in left axilla Will repeat PET CT \par - PICC removed\par - Concern for worsening bone density and fractures due to anastrozole. Rec to  continue calcium and vit D. DEXA 1-2 yrs.  Will order at next visit\par - High cholesterol/CAD risk factors: Concern for worsening cholesterol/CAD risk factors due to anastrozole. Pt is on simvastatin . Lipid profile annually. Life style modifications d/w her.\par -Mild aromatase inhibitors-induced arthralgias: Recommend stretching exercises.  Will consider treatment break and switching to exemestane if symptoms worsen.\par 	\par RTC 4 to 6 weeks to discuss neratinib\par CHEMO ORDERS AND LABS ORDERED IN [place] FOR TODAY\par

## 2022-02-03 DIAGNOSIS — R11.2 NAUSEA WITH VOMITING, UNSPECIFIED: ICD-10-CM

## 2022-02-03 DIAGNOSIS — Z51.11 ENCOUNTER FOR ANTINEOPLASTIC CHEMOTHERAPY: ICD-10-CM

## 2022-02-03 LAB — CANCER AG27-29 SERPL-ACNC: 20.6 U/ML — SIGNIFICANT CHANGE UP (ref 0–38.6)

## 2022-02-23 ENCOUNTER — APPOINTMENT (OUTPATIENT)
Dept: NUCLEAR MEDICINE | Facility: IMAGING CENTER | Age: 62
End: 2022-02-23
Payer: COMMERCIAL

## 2022-02-23 ENCOUNTER — OUTPATIENT (OUTPATIENT)
Dept: OUTPATIENT SERVICES | Facility: HOSPITAL | Age: 62
LOS: 1 days | End: 2022-02-23
Payer: COMMERCIAL

## 2022-02-23 DIAGNOSIS — C50.912 MALIGNANT NEOPLASM OF UNSPECIFIED SITE OF LEFT FEMALE BREAST: ICD-10-CM

## 2022-02-23 DIAGNOSIS — Z86.39 PERSONAL HISTORY OF OTHER ENDOCRINE, NUTRITIONAL AND METABOLIC DISEASE: Chronic | ICD-10-CM

## 2022-02-23 DIAGNOSIS — Z98.890 OTHER SPECIFIED POSTPROCEDURAL STATES: Chronic | ICD-10-CM

## 2022-02-23 PROCEDURE — 78815 PET IMAGE W/CT SKULL-THIGH: CPT | Mod: 26,PS

## 2022-02-23 PROCEDURE — 78815 PET IMAGE W/CT SKULL-THIGH: CPT

## 2022-02-23 PROCEDURE — A9552: CPT

## 2022-03-01 ENCOUNTER — NON-APPOINTMENT (OUTPATIENT)
Age: 62
End: 2022-03-01

## 2022-03-04 ENCOUNTER — TRANSCRIPTION ENCOUNTER (OUTPATIENT)
Age: 62
End: 2022-03-04

## 2022-04-01 ENCOUNTER — APPOINTMENT (OUTPATIENT)
Dept: PLASTIC SURGERY | Facility: CLINIC | Age: 62
End: 2022-04-01
Payer: COMMERCIAL

## 2022-04-01 VITALS
HEIGHT: 61 IN | SYSTOLIC BLOOD PRESSURE: 106 MMHG | TEMPERATURE: 97.2 F | OXYGEN SATURATION: 96 % | HEART RATE: 75 BPM | WEIGHT: 150 LBS | DIASTOLIC BLOOD PRESSURE: 74 MMHG | BODY MASS INDEX: 28.32 KG/M2

## 2022-04-01 PROCEDURE — 99215 OFFICE O/P EST HI 40 MIN: CPT

## 2022-04-01 NOTE — HISTORY OF PRESENT ILLNESS
[FreeTextEntry1] : Jazlyn is a 61 year old female who presents for a follow up evaluation. Patient is s/p bilateral breast reconstruction with MADISON flaps on 7/15/21. 14/18 left axillary lymph nodes positive for metastatic carcinoma, extranodal extension is present.  Was started on Adjuvant Kadycla on 8/18/21. Radiation completed 9/2021. Patient developed peripheral neuropathy after 4 doses of Kadcyla and it was stopped in Nov 2021. She restarted Herceptin Perjeta x4 cycles to finish 1 year of HP. Anastrozole started 10/2021 and she is currently taking with good compliance. Insurance changed herceptin to kanjinti.  She presents today to discuss revision surgery. She is not interested in nipple reconstruction, but 3D nipple tattooing, she would like a lift/reduction of the right breast to match the left breast.  She is considering abdominal donor site revision, but does not want drains.\par

## 2022-04-01 NOTE — PHYSICAL EXAM
[de-identified] : alert, calm, cooperative\par  [de-identified] : respirations are even and unlabored\par  [de-identified] : bilateral breast incisions are well-healed.  There are radiation soft tissue changes noted to the left breast.  No signs of wound dehiscence or fluctuance. Flaps are soft, skin paddles pink with good capillary refill. Mild breast asymmetry (right > left)\par  [de-identified] : transverse abdominal and umbilical incisions are well-healed.  No signs of fluctuance.  Mild dog ears bilaterally. fullness centrally.

## 2022-04-05 ENCOUNTER — OUTPATIENT (OUTPATIENT)
Dept: OUTPATIENT SERVICES | Facility: HOSPITAL | Age: 62
LOS: 1 days | Discharge: HOME | End: 2022-04-05

## 2022-04-05 DIAGNOSIS — Z86.39 PERSONAL HISTORY OF OTHER ENDOCRINE, NUTRITIONAL AND METABOLIC DISEASE: Chronic | ICD-10-CM

## 2022-04-05 DIAGNOSIS — Z98.890 OTHER SPECIFIED POSTPROCEDURAL STATES: Chronic | ICD-10-CM

## 2022-04-05 DIAGNOSIS — C50.919 MALIGNANT NEOPLASM OF UNSPECIFIED SITE OF UNSPECIFIED FEMALE BREAST: ICD-10-CM

## 2022-04-11 ENCOUNTER — RESULT REVIEW (OUTPATIENT)
Age: 62
End: 2022-04-11

## 2022-04-11 ENCOUNTER — APPOINTMENT (OUTPATIENT)
Dept: HEMATOLOGY ONCOLOGY | Facility: CLINIC | Age: 62
End: 2022-04-11
Payer: COMMERCIAL

## 2022-04-11 VITALS
HEIGHT: 60.98 IN | OXYGEN SATURATION: 97 % | WEIGHT: 150.99 LBS | HEART RATE: 74 BPM | TEMPERATURE: 97.2 F | SYSTOLIC BLOOD PRESSURE: 111 MMHG | BODY MASS INDEX: 28.51 KG/M2 | RESPIRATION RATE: 16 BRPM | DIASTOLIC BLOOD PRESSURE: 75 MMHG

## 2022-04-11 DIAGNOSIS — R91.8 OTHER NONSPECIFIC ABNORMAL FINDING OF LUNG FIELD: ICD-10-CM

## 2022-04-11 DIAGNOSIS — K21.9 GASTRO-ESOPHAGEAL REFLUX DISEASE W/OUT ESOPHAGITIS: ICD-10-CM

## 2022-04-11 LAB
BASOPHILS # BLD AUTO: 0.02 K/UL — SIGNIFICANT CHANGE UP (ref 0–0.2)
BASOPHILS NFR BLD AUTO: 0.5 % — SIGNIFICANT CHANGE UP (ref 0–2)
EOSINOPHIL # BLD AUTO: 0.05 K/UL — SIGNIFICANT CHANGE UP (ref 0–0.5)
EOSINOPHIL NFR BLD AUTO: 1.2 % — SIGNIFICANT CHANGE UP (ref 0–6)
HCT VFR BLD CALC: 37.7 % — SIGNIFICANT CHANGE UP (ref 34.5–45)
HGB BLD-MCNC: 12.1 G/DL — SIGNIFICANT CHANGE UP (ref 11.5–15.5)
IMM GRANULOCYTES NFR BLD AUTO: 0.2 % — SIGNIFICANT CHANGE UP (ref 0–1.5)
LYMPHOCYTES # BLD AUTO: 1.15 K/UL — SIGNIFICANT CHANGE UP (ref 1–3.3)
LYMPHOCYTES # BLD AUTO: 28 % — SIGNIFICANT CHANGE UP (ref 13–44)
MCHC RBC-ENTMCNC: 30.6 PG — SIGNIFICANT CHANGE UP (ref 27–34)
MCHC RBC-ENTMCNC: 32.1 G/DL — SIGNIFICANT CHANGE UP (ref 32–36)
MCV RBC AUTO: 95.4 FL — SIGNIFICANT CHANGE UP (ref 80–100)
MONOCYTES # BLD AUTO: 0.42 K/UL — SIGNIFICANT CHANGE UP (ref 0–0.9)
MONOCYTES NFR BLD AUTO: 10.2 % — SIGNIFICANT CHANGE UP (ref 2–14)
NEUTROPHILS # BLD AUTO: 2.46 K/UL — SIGNIFICANT CHANGE UP (ref 1.8–7.4)
NEUTROPHILS NFR BLD AUTO: 59.9 % — SIGNIFICANT CHANGE UP (ref 43–77)
NRBC # BLD: 0 /100 WBCS — SIGNIFICANT CHANGE UP (ref 0–0)
PLATELET # BLD AUTO: 186 K/UL — SIGNIFICANT CHANGE UP (ref 150–400)
RBC # BLD: 3.95 M/UL — SIGNIFICANT CHANGE UP (ref 3.8–5.2)
RBC # FLD: 14.8 % — HIGH (ref 10.3–14.5)
WBC # BLD: 4.11 K/UL — SIGNIFICANT CHANGE UP (ref 3.8–10.5)
WBC # FLD AUTO: 4.11 K/UL — SIGNIFICANT CHANGE UP (ref 3.8–10.5)

## 2022-04-11 PROCEDURE — 99215 OFFICE O/P EST HI 40 MIN: CPT

## 2022-04-12 LAB
25(OH)D3 SERPL-MCNC: 18.3 NG/ML
ALBUMIN SERPL ELPH-MCNC: 4.4 G/DL
ALP BLD-CCNC: 65 U/L
ALT SERPL-CCNC: 23 U/L
ANION GAP SERPL CALC-SCNC: 10 MMOL/L
AST SERPL-CCNC: 25 U/L
BILIRUB SERPL-MCNC: 0.3 MG/DL
BUN SERPL-MCNC: 15 MG/DL
CALCIUM SERPL-MCNC: 9.5 MG/DL
CHLORIDE SERPL-SCNC: 104 MMOL/L
CO2 SERPL-SCNC: 26 MMOL/L
CREAT SERPL-MCNC: 0.66 MG/DL
EGFR: 99 ML/MIN/1.73M2
GLUCOSE SERPL-MCNC: 100 MG/DL
POTASSIUM SERPL-SCNC: 4.5 MMOL/L
PROT SERPL-MCNC: 7.2 G/DL
SODIUM SERPL-SCNC: 140 MMOL/L

## 2022-04-19 PROBLEM — R91.8 OPACITY OF LUNG ON IMAGING STUDY: Status: ACTIVE | Noted: 2022-04-19

## 2022-04-19 NOTE — PHYSICAL EXAM
[Fully active, able to carry on all pre-disease performance without restriction] : Status 0 - Fully active, able to carry on all pre-disease performance without restriction [Normal] : affect appropriate [de-identified] : Port removed, picc line removed [de-identified] : BL mastectomies healing well, mild post RT changes

## 2022-04-19 NOTE — ASSESSMENT
[Curative] : Goals of care discussed with patient: Curative [FreeTextEntry1] : Ms. KELSY CRANE  is here for a follow up appt for left breast ca, dx 8/28/2020  started neoadjuvant TCHP with onpro on 9/30/2020.  She tested positive for Covid by PCR on 10/30/2020.  A repeat test at 2 weeks was positive again on 11/13/2020.  She was admitted on 11/15/2020 with port site infection.  She was treated with IV antibiotics and was discharged on Zyvox.  Subsequently Covid PCR was done x2 and was negative.  She received cycle 3 on 12/2/2020. S/p admission 12/20/2020-12/28/2020 for port infection, port removed. s/p HP #4 on 1/7 (TC held) and TCHP 4, HP 5 on 1/27. TC # 6, HP# 7 on 3/10/2021.  She continued HP every 3 weeks, HP #13 on 7/7/21. Patient underwent bilateral mastectomies with reconstruction on 7/15/2021.  Left mastectomy showed invasive ductal carcinoma, high-grade, 3.4 cm, dermal lymphovascular invasion present, anterior margin 0.2 cm, tumor involved dermis.  14/18 lymph nodes were positive for metastatic disease, largest metastatic focus measured 2.2 cm with extranodal extension. ypT2 yp N3a. Adjuvant Kadycla started 8/18/21.  Radiation completed 9/20201.  Patient developed peripheral neuropathy after 4 doses of Kadcyla and it was stopped in Nov 2021.  She restarted Herceptin Perjeta x4 cycles to finish 1 year of HP.  Anastrozole started 10/2021\par \par BREAST CANCER: ER POSITIVE/HER-2/USHA POSITIVE.  Status post neoadjuvant chemotherapy with significant residual disease.  Status post bilateral mastectomy 7/2021.  Patient started Kadcyla 8/2021.  Status post 4 cycles.  She developed peripheral neuropathy, mild pancytopenia and transaminitis secondary to Kadcyla.  We discussed today to stop Kadcyla.  She is restarted on Herceptin and Perjeta.  She had previously received 13 cycles.  We will treat with 4 more cycles to complete 1 year of Herceptin Perjeta therapy.\par Last Herceptin \par Patient has high risk node positive, HER-2 positive breast cancer.  Patient was not able to get Kadcyla therefore we discussed neratinib.  She will follow-up with me in 1 to 2 months.  If patient is agreeable, we will start neratinib. We discussed starting neratinib today 4/11/21 but she does not want additional medications as she is concerned about side effects.. Also reports minor rash occasionally to right upper arm not present now. discussed seeing derm but wants to wait on seeing dermatology, she will monitor.\par  \par \par Ms. KELSY CRANE  is tolerating AI well, good compliance. She has mild side effects from the treatment. Continue endocrine x 10 yrs due to high risk node positive disease.  We could consider addition of neratinib after HP but patient does not want additional medications.\par  Discussed 02/23/2022 PET CT  New mildly FDG-avid peripheral pulmonary opacities in left upper lobe, lingula and new 1 cm cyst within peripheral left upper lobe opacity most likely related to RT. No scan evidence of metastatic disease. She denies SOB, or cough. We will obtain CT of chest in 5/2022\par -GERD -occasional reflux after eating spicy foods continue omeprazole prn\par -CHEMO INDUCED PERIPHERAL NEUROPATHY: Improved with discontinuation of treatment\par - AT RISK FOR CHEMO INDUCED CARDIOMYOPATHY: Check ECHO Q3m to monitor for treatment induced cardiotoxicity. LVEF  Next echo due in 1 year 12/2022\par \par -CHEMO INDUCED PANCYTOPENIA:  improved /resolved since off chem 4/11/2022 CBC WNL  No suspicion for marrow disease at this time but we will continue to monitor.\par - Chemo induced transaminitis: resolved  Continue to monitor\par -Pulmonary nodules: 08/17/2021 PETCT ALEXX, Nonspecific few small foci of mild FDG activity adjacent to surgical clips in left axilla 2/23/2022 PET CT ALEXX plan as above \par - PICC removed\par - Concern for worsening bone density and fractures due to anastrozole. Rec to  continue calcium and vit D. DEXA 1-2 yrs. ordered today reminded patient to schedule\par - High cholesterol/CAD risk factors: Concern for worsening cholesterol/CAD risk factors due to anastrozole. Pt is on simvastatin . Lipid profile annually. Life style modifications d/w her.\par -Mild aromatase inhibitors-induced arthralgias: Recommend stretching exercises.  Will consider treatment break and switching to exemestane if symptoms worsen.\par 	\par RTO;\par CT Chest end of May f/u 1 week after scan 6/10/22

## 2022-04-19 NOTE — HISTORY OF PRESENT ILLNESS
[de-identified] : Ms.Joanne Damon is a 60 year old female here for an evaluation of breast cancer. Her oncologic history is as follows:\par \par Patient noticed left breast mass for 4 months and presented to plastic surgeon's office. She was sent for breast imaging on 8/19/2020 BIRADS 5 which showed new irregular mass with calcifications and nipple retraction in the left retroareolar location on mammography, corresponding to a mass in the left breast 6:00 location 2 cm from the nipple on the concurrent breast ultrasound. Ultrasound-guided core biopsy is recommended for diagnosis.\par - Abnormal left axillary lymph nodes on mammography and sonography, including lymph nodes demonstrating microcalcifications. Ultrasound-guided core biopsy of a representative abnormal appearing left axillary lymph node with calcifications is recommended.\par - Faint loosely grouped calcifications in the upper central left breast and the upper central right breast which are indeterminate. Sampling stereotactic biopsy of both breasts is recommended.\par - Loosely grouped predominantly coarse dystrophic calcifications associated with areas of central lucency in the posterior medial right breast which are thought to represent fat necrosis. Six-month follow-up right diagnostic mammography is recommended. She underwent New irregular mass with calcifications and nipple retraction in the left retroareolar location on mammography, corresponding to a mass in the left breast 6:00 location 2 cm from the nipple on the concurrent breast ultrasound. Ultrasound-guided core biopsy is recommended for diagnosis.\par  She had a left, 6 o'clock, 2 cm FN, core biopsy on on 8/28/2020 which showed Invasive poorly differentiated ductal carcinoma with areas of micropapillary features\par - [place] score 9/9 (3 + 3 + 3)\par - Invasive tumor measures at least 1.0 cm\par - Ductal carcinoma in situ (focal), cribriform pattern with\par high nuclear grade and central necrosis\par \par  Breast, left, axillary lymph node, core biopsy\par - Invasive poorly differentiated ductal carcinoma with mild lymphocytic infiltrate\par ER+ 80%,[place]+ 50%, HER 2 + breast cancer\par \par She has been on Prempro x 7 years for hot flashes. She is off now x 2 weeks. Hot flashes are ok\par She had breast augmentation with Dr Geiger. Implants exchanged 2019.  Benign biopsy in 2019\par \par 9/30/2020\par 9/25/2020 ECHO LVEF 57%\par PETCT SKUL-THI ONC  09/18/2020 No evidence of distant metastasis.\par EKG 2/2020 faxed by PCP , repeat 9/30/2020 done , Chemo consent obtained \par Right side Medport in place, catheter tip at the distal SVC\par \par 8/4/2021\par We discussed pathology report in detail today.  We reviewed that she completed chemo in March 2021.  Follow-up breast MRI showed partial response in the breast and pathologic lymph node had normalized in appearance.  She had significant delay until she finally had surgery in July. Patient underwent bilateral mastectomies with reconstruction on 7/15/2021.  Left mastectomy showed invasive ductal carcinoma, high-grade, 3.4 cm, dermal lymphovascular invasion present, anterior margin 0.2 cm, tumor involved dermis.  14/18 lymph nodes were positive for metastatic disease, largest metastatic focus measured 2.2 cm with extranodal extension. ypT2 yp N3a.  She had multiple positive lymph nodes and is at high risk for local and distant recurrence.\par We discussed that she is a candidate for postmastectomy radiation therapy.  Patient was very upset and tearful to hear that.  She said she underwent bilateral mastectomies in the hope that she will not need any further treatment.  We discussed that we will not expecting to see so many positive lymph nodes but ER positive disease also does not usually have complete response to neoadjuvant chemo.  I reviewed that typically postmastectomy radiation is considered standard of care for patients with more than 3+ lymph nodes.  She agreed to at least have a consultation with radiation oncology.  Email referral was sent.\par \par We discussed data from Bernadette study which showed benefit from TDM 1 in patients with residual disease.  I had discussed the same with the patient prior to surgery and last week over the phone.  She feels her body is okay with medications and she cannot take it anymore.  She is hoping to finish 4 more Herceptin Perjeta to finish 1 year of HER-2 therapy.  Side effects of Kadcyla were reviewed and patient was reassured that side effects are mostly very manageable.  She was again very overwhelmed and tearful and did not want to switch to TDM 1.  She does not have PICC line or port and was concerned about TDM 1x14 cycles.  She wanted to get HP today.\par \par We discussed the data for neratinib in high risk HER-2 positive patients.  If she does not agree for TDM 1, she will be a candidate for neratinib.  We discussed that even though it is a pill treatment, GI toxicity is significant.\par \par We also discussed that she will take aromatase inhibitors for 10 years after finishing radiation.\par Patient is very reluctant and hesitant to take postmastectomy radiation and Kadcyla which I believe is necessary to keep her disease-free and long-term.  I recommend that she also seek a second opinion.  She was also encouraged to participate in clinical trials for adjuvant HER-2 positive breast cancer, if offered by second opinion oncologist.\par \par Addendum: Patient went to treatment room but then refused treatment with HP.  We will check with her in a week and reschedule either HP or TDM 1 depending on her decision.\par \par 8/18/21\par Patient here to start Kadcyla (TDM 1) x14 cycles q 3 weeks tx #1/14 today Counts good\par Patient expressed concern about hair loss with Kadcyla I explained that hair loss is usually not seen with Kadcyla chemo s/e discussed\par Medication for symptom management reviewed and questions answered\par Discussed 08/17/2021 PETCT ALEXX, Nonspecific few small foci of mild FDG activity adjacent to surgical clips in left axilla Will repeat PET CT after completion of TDM1 x 14 cycles \par Last ECHO 5/24/21 EF 58%. Reminded her to schedule next ECHO  this month.\par Chemo consent obtained she is scheduled for PICC line insertion \par Patient is a anxious about PICC line, will give Xanax for use pre procedure\par Emotional support given\par \par 10/2021\par Patient completed radiation therapy 2 weeks ago.  He has mild fatigue and skin reaction.  Otherwise overall doing well.  She reports mild numbness and tingling occasionally in her fingertips and toes.  We discussed possibility of Kadcyla induced neuropathy.  No pain.  The symptoms are inconsistent and are not affecting her quality of life at this point therefore we will continue Kadcyla but monitor her closely.\par We discussed to start endocrine therapy with anastrozole.  Side effects were reviewed.\par \par \par 11/2021\par peripheral neuropathy worse\par likely 2/2 kadcyla\par Rec to stop kadcyla at this point \par She had 13 cycles oh HP, will continue 4 more to finish 1 year\par She has mild stiffness in fingers. Continue AI for now\par Monitor closely. If worsen, will switch to exemestane. \par  [de-identified] : Ms. KELSY CRANE  is here for a follow up appt for left breast ca, dx 8/28/2020  started neoadjuvant TCHP with onpro on 9/30/2020.  She tested positive for Covid by PCR on 10/30/2020.  A repeat test at 2 weeks was positive again on 11/13/2020.  She was admitted on 11/15/2020 with port site infection.  She was treated with IV antibiotics and was discharged on Zyvox.  Subsequently Covid PCR was done x2 and was negative.  She received cycle 3 on 12/2/2020. S/p admission 12/20/2020-12/28/2020 for port infection, port removed. s/p HP #4 on 1/7 (TC held) and TCHP 4, HP 5 on 1/27. TC # 6, HP# 7 on 3/10/2021.  She continued HP every 3 weeks, HP #13 on 7/7/21. Patient underwent bilateral mastectomies with reconstruction on 7/15/2021.  Left mastectomy showed invasive ductal carcinoma, high-grade, 3.4 cm, dermal lymphovascular invasion present, anterior margin 0.2 cm, tumor involved dermis.  14/18 lymph nodes were positive for metastatic disease, largest metastatic focus measured 2.2 cm with extranodal extension. ypT2 yp N3a. Adjuvant Kadycla started 8/18/21.  Radiation completed 9/20201.  Patient developed peripheral neuropathy after 4 doses of Kadcyla and it was stopped in Nov 2021.  She restarted Herceptin Perjeta x4 cycles to finish 1 year of HP. herceptin perjeta 17/17. Insurance changed herceptin to kanjinti \par  Anastrozole started 10/2021\par \par Takes Anastrozole daily, good compliance.  She has mild stiffness in her fingers.  She is using Voltaren gel with relief.  She denies aches/pain, hot flashes, vag dryness, excessive fatigue, GI s/e, hair loss. She is active, no change in energy, wt or appetite. \par mammogram -   not needed\par Neuropathy has improved since stopping Kadcyla.\par no lymphedema, excellent ROM, doing jair\par DEXA- not done reminded schedule .will check vit D level today She takes ca+vit D\par echo 9/2021, 12/2021-repeat in 1 year 12/2022\par PICC line removed \par 08/17/2021 PETCT ALEXX, Nonspecific few small foci of mild FDG activity adjacent to surgical clips in left axilla.  We plan to repeat PET/CT after completion of HP.  PET/CT ordered today\par She had mild pancytopenia and transaminitis 2/2 kadcyla: We will monitor\par Patient has high risk HER-2 positive breast cancer.  Patient was not able to get Kadcyla therefore we discussed neratinib.  She will follow-up with me in 1 to 2 months.  If patient is agreeable, we will start neratinib.\par 4/11/2022 We discussed starting neratinib but she does not want additional medications as she is concerned about side effects. She reports occasional reflux after eating spicy foods, no vomiting or abdominal pain. Also reports minor rash occasionally to right upper arm not present now. discussed seeing derm but wants to wait on seeing dermatology, she will monitor.\par  Discussed 02/23/2022 PET CT  New mildly FDG-avid peripheral pulmonary opacities in left upper lobe and lingula and new 1 cm cyst within peripheral left upper lobe opacity probably are related to interval radiation therapy. No scan evidence of metastatic disease.  She denies SOB,or cough We will obtain CT of chest in 5/2022\par She had some questions/concerns about  breast reconstruction will f/u with Dr. Levi \par

## 2022-05-17 ENCOUNTER — NON-APPOINTMENT (OUTPATIENT)
Age: 62
End: 2022-05-17

## 2022-06-03 ENCOUNTER — OUTPATIENT (OUTPATIENT)
Dept: OUTPATIENT SERVICES | Facility: HOSPITAL | Age: 62
LOS: 1 days | End: 2022-06-03
Payer: COMMERCIAL

## 2022-06-03 ENCOUNTER — APPOINTMENT (OUTPATIENT)
Dept: CT IMAGING | Facility: IMAGING CENTER | Age: 62
End: 2022-06-03
Payer: COMMERCIAL

## 2022-06-03 ENCOUNTER — OUTPATIENT (OUTPATIENT)
Dept: OUTPATIENT SERVICES | Facility: HOSPITAL | Age: 62
LOS: 1 days | Discharge: HOME | End: 2022-06-03

## 2022-06-03 DIAGNOSIS — Z98.890 OTHER SPECIFIED POSTPROCEDURAL STATES: Chronic | ICD-10-CM

## 2022-06-03 DIAGNOSIS — Z86.39 PERSONAL HISTORY OF OTHER ENDOCRINE, NUTRITIONAL AND METABOLIC DISEASE: Chronic | ICD-10-CM

## 2022-06-03 DIAGNOSIS — Z00.8 ENCOUNTER FOR OTHER GENERAL EXAMINATION: ICD-10-CM

## 2022-06-03 DIAGNOSIS — C50.912 MALIGNANT NEOPLASM OF UNSPECIFIED SITE OF LEFT FEMALE BREAST: ICD-10-CM

## 2022-06-03 DIAGNOSIS — C50.919 MALIGNANT NEOPLASM OF UNSPECIFIED SITE OF UNSPECIFIED FEMALE BREAST: ICD-10-CM

## 2022-06-03 PROCEDURE — 71260 CT THORAX DX C+: CPT | Mod: 26

## 2022-06-03 PROCEDURE — 71260 CT THORAX DX C+: CPT

## 2022-06-10 ENCOUNTER — APPOINTMENT (OUTPATIENT)
Dept: HEMATOLOGY ONCOLOGY | Facility: CLINIC | Age: 62
End: 2022-06-10
Payer: COMMERCIAL

## 2022-06-10 VITALS
HEART RATE: 94 BPM | HEIGHT: 61.73 IN | OXYGEN SATURATION: 97 % | RESPIRATION RATE: 16 BRPM | DIASTOLIC BLOOD PRESSURE: 77 MMHG | TEMPERATURE: 97.3 F | BODY MASS INDEX: 27.75 KG/M2 | SYSTOLIC BLOOD PRESSURE: 114 MMHG | WEIGHT: 150.77 LBS

## 2022-06-10 PROCEDURE — 99214 OFFICE O/P EST MOD 30 MIN: CPT

## 2022-06-16 NOTE — PHYSICAL EXAM
[Fully active, able to carry on all pre-disease performance without restriction] : Status 0 - Fully active, able to carry on all pre-disease performance without restriction [Normal] : affect appropriate [de-identified] : Port removed, picc line removed [de-identified] : BL mastectomies healing well, mild post RT changes

## 2022-06-16 NOTE — ASSESSMENT
[Curative] : Goals of care discussed with patient: Curative [FreeTextEntry1] : Ms. KELSY CRANE  is here for a follow up appt for left breast ca, dx 8/28/2020  started neoadjuvant TCHP with onpro on 9/30/2020.  She tested positive for Covid by PCR on 10/30/2020.  A repeat test at 2 weeks was positive again on 11/13/2020.  She was admitted on 11/15/2020 with port site infection.  She was treated with IV antibiotics and was discharged on Zyvox.  Subsequently Covid PCR was done x2 and was negative.  She received cycle 3 on 12/2/2020. S/p admission 12/20/2020-12/28/2020 for port infection, port removed. s/p HP #4 on 1/7 (TC held) and TCHP 4, HP 5 on 1/27. TC # 6, HP# 7 on 3/10/2021.  She continued HP every 3 weeks, HP #13 on 7/7/21. Patient underwent bilateral mastectomies with reconstruction on 7/15/2021.  Left mastectomy showed invasive ductal carcinoma, high-grade, 3.4 cm, dermal lymphovascular invasion present, anterior margin 0.2 cm, tumor involved dermis.  14/18 lymph nodes were positive for metastatic disease, largest metastatic focus measured 2.2 cm with extranodal extension. ypT2 yp N3a. Adjuvant Kadycla started 8/18/21.  Radiation completed 9/20201.  Patient developed peripheral neuropathy after 4 doses of Kadcyla and it was stopped in Nov 2021.  She restarted Herceptin Perjeta x4 cycles to finish 1 year of HP.  Anastrozole started 10/2021\par \par BREAST CANCER: ER POSITIVE/HER-2/USHA POSITIVE.  Status post neoadjuvant chemotherapy with significant residual disease.  Status post bilateral mastectomy 7/2021.  Patient started Kadcyla 8/2021.  Status post 4 cycles.  She developed peripheral neuropathy, mild pancytopenia and transaminitis secondary to Kadcyla.  Kadcyla was stopped and she completed a year of HP. Patient has high risk node positive, HER-2 positive breast cancer.  Patient was not able to get Kadcyla therefore we discussed neratinib but she does not want additional medications as she is concerned about side effects..\par \par Ms. KELSY CRANE  is tolerating AI well, good compliance. She has mild side effects from the treatment. Continue endocrine x 10 yrs due to high risk node positive disease.  We could consider addition of neratinib after HP but patient does not want additional medications.\par \par \par -CHEMO INDUCED PERIPHERAL NEUROPATHY: Improved with discontinuation of treatment\par - AT RISK FOR CHEMO INDUCED CARDIOMYOPATHY: Check ECHO Q3m to monitor for treatment induced cardiotoxicity. LVEF  Next echo due in 1 year 12/2022\par \par \par -Pulmonary nodules: 08/17/2021 PETCT ALEXX, Nonspecific few small foci of mild FDG activity adjacent to surgical clips in left axilla.  \par 02/23/2022 PET CT  New mildly FDG-avid peripheral pulmonary opacities in left upper lobe and lingula and new 1 cm cyst within peripheral left upper lobe opacity probably are related to interval radiation therapy. No scan evidence of metastatic disease.  \par \par CT chest 6/2022: improvement i RT changes\par - significant other x 15 yrs passed on vacation- emotional support provided\par - Concern for worsening bone density and fractures due to anastrozole. Rec to  continue calcium and vit D. DEXA 1-2 yrs. ordered today reminded patient to schedule\par - High cholesterol/CAD risk factors: Concern for worsening cholesterol/CAD risk factors due to anastrozole. Pt is on simvastatin . Lipid profile annually. Life style modifications d/w her.\par -Mild aromatase inhibitors-induced arthralgias: Recommend stretching exercises.  Will consider treatment break and switching to exemestane if symptoms worsen.\par 	\par RTO 3 m

## 2022-06-16 NOTE — REASON FOR VISIT
[Follow-Up Visit] : a follow-up [Other: _____] : [unfilled] [FreeTextEntry2] : ER+ 80%,CA+ 50%, HER 2 + breast cancer

## 2022-06-16 NOTE — HISTORY OF PRESENT ILLNESS
[de-identified] : Ms.Joanne Damon is a 60 year old female here for an evaluation of breast cancer. Her oncologic history is as follows:\par \par Patient noticed left breast mass for 4 months and presented to plastic surgeon's office. She was sent for breast imaging on 8/19/2020 BIRADS 5 which showed new irregular mass with calcifications and nipple retraction in the left retroareolar location on mammography, corresponding to a mass in the left breast 6:00 location 2 cm from the nipple on the concurrent breast ultrasound. Ultrasound-guided core biopsy is recommended for diagnosis.\par - Abnormal left axillary lymph nodes on mammography and sonography, including lymph nodes demonstrating microcalcifications. Ultrasound-guided core biopsy of a representative abnormal appearing left axillary lymph node with calcifications is recommended.\par - Faint loosely grouped calcifications in the upper central left breast and the upper central right breast which are indeterminate. Sampling stereotactic biopsy of both breasts is recommended.\par - Loosely grouped predominantly coarse dystrophic calcifications associated with areas of central lucency in the posterior medial right breast which are thought to represent fat necrosis. Six-month follow-up right diagnostic mammography is recommended. She underwent New irregular mass with calcifications and nipple retraction in the left retroareolar location on mammography, corresponding to a mass in the left breast 6:00 location 2 cm from the nipple on the concurrent breast ultrasound. Ultrasound-guided core biopsy is recommended for diagnosis.\par  She had a left, 6 o'clock, 2 cm FN, core biopsy on on 8/28/2020 which showed Invasive poorly differentiated ductal carcinoma with areas of micropapillary features\par - [place] score 9/9 (3 + 3 + 3)\par - Invasive tumor measures at least 1.0 cm\par - Ductal carcinoma in situ (focal), cribriform pattern with\par high nuclear grade and central necrosis\par \par  Breast, left, axillary lymph node, core biopsy\par - Invasive poorly differentiated ductal carcinoma with mild lymphocytic infiltrate\par ER+ 80%,IL+ 50%, HER 2 + breast cancer\par \par She has been on Prempro x 7 years for hot flashes. She is off now x 2 weeks. Hot flashes are ok\par She had breast augmentation with Dr Geiger. Implants exchanged 2019.  Benign biopsy in 2019\par \par 9/30/2020\par 9/25/2020 ECHO LVEF 57%\par PETCT SKUL-THI ONC  09/18/2020 No evidence of distant metastasis.\par EKG 2/2020 faxed by PCP , repeat 9/30/2020 done , Chemo consent obtained \par Right side Medport in place, catheter tip at the distal SVC\par \par 8/4/2021\par We discussed pathology report in detail today.  We reviewed that she completed chemo in March 2021.  Follow-up breast MRI showed partial response in the breast and pathologic lymph node had normalized in appearance.  She had significant delay until she finally had surgery in July. Patient underwent bilateral mastectomies with reconstruction on 7/15/2021.  Left mastectomy showed invasive ductal carcinoma, high-grade, 3.4 cm, dermal lymphovascular invasion present, anterior margin 0.2 cm, tumor involved dermis.  14/18 lymph nodes were positive for metastatic disease, largest metastatic focus measured 2.2 cm with extranodal extension. ypT2 yp N3a.  She had multiple positive lymph nodes and is at high risk for local and distant recurrence.\par We discussed that she is a candidate for postmastectomy radiation therapy.  Patient was very upset and tearful to hear that.  She said she underwent bilateral mastectomies in the hope that she will not need any further treatment.  We discussed that we will not expecting to see so many positive lymph nodes but ER positive disease also does not usually have complete response to neoadjuvant chemo.  I reviewed that typically postmastectomy radiation is considered standard of care for patients with more than 3+ lymph nodes.  She agreed to at least have a consultation with radiation oncology.  Email referral was sent.\par \par We discussed data from Bernadette study which showed benefit from TDM 1 in patients with residual disease.  I had discussed the same with the patient prior to surgery and last week over the phone.  She feels her body is okay with medications and she cannot take it anymore.  She is hoping to finish 4 more Herceptin Perjeta to finish 1 year of HER-2 therapy.  Side effects of Kadcyla were reviewed and patient was reassured that side effects are mostly very manageable.  She was again very overwhelmed and tearful and did not want to switch to TDM 1.  She does not have PICC line or port and was concerned about TDM 1x14 cycles.  She wanted to get HP today.\par \par We discussed the data for neratinib in high risk HER-2 positive patients.  If she does not agree for TDM 1, she will be a candidate for neratinib.  We discussed that even though it is a pill treatment, GI toxicity is significant.\par \par We also discussed that she will take aromatase inhibitors for 10 years after finishing radiation.\par Patient is very reluctant and hesitant to take postmastectomy radiation and Kadcyla which I believe is necessary to keep her disease-free and long-term.  I recommend that she also seek a second opinion.  She was also encouraged to participate in clinical trials for adjuvant HER-2 positive breast cancer, if offered by second opinion oncologist.\par \par Addendum: Patient went to treatment room but then refused treatment with HP.  We will check with her in a week and reschedule either HP or TDM 1 depending on her decision.\par \par 8/18/21\par Patient here to start Kadcyla (TDM 1) x14 cycles q 3 weeks tx #1/14 today Counts good\par Patient expressed concern about hair loss with Kadcyla I explained that hair loss is usually not seen with Kadcyla chemo s/e discussed\par Medication for symptom management reviewed and questions answered\par Discussed 08/17/2021 PETCT ALEXX, Nonspecific few small foci of mild FDG activity adjacent to surgical clips in left axilla Will repeat PET CT after completion of TDM1 x 14 cycles \par Last ECHO 5/24/21 EF 58%. Reminded her to schedule next ECHO  this month.\par Chemo consent obtained she is scheduled for PICC line insertion \par Patient is a anxious about PICC line, will give Xanax for use pre procedure\par Emotional support given\par \par 10/2021\par Patient completed radiation therapy 2 weeks ago.  He has mild fatigue and skin reaction.  Otherwise overall doing well.  She reports mild numbness and tingling occasionally in her fingertips and toes.  We discussed possibility of Kadcyla induced neuropathy.  No pain.  The symptoms are inconsistent and are not affecting her quality of life at this point therefore we will continue Kadcyla but monitor her closely.\par We discussed to start endocrine therapy with anastrozole.  Side effects were reviewed.\par \par \par 11/2021\par peripheral neuropathy worse\par likely 2/2 kadcyla\par Rec to stop kadcyla at this point \par She had 13 cycles oh HP, will continue 4 more to finish 1 year\par She has mild stiffness in fingers. Continue AI for now\par Monitor closely. If worsen, will switch to exemestane. \par \par Patient has high risk HER-2 positive breast cancer.  Patient was not able to get Kadcyla therefore we discussed neratinib.  She will follow-up with me in 1 to 2 months.  If patient is agreeable, we will start neratinib.\par 4/11/2022 We discussed starting neratinib but she does not want additional medications as she is concerned about side effects. [de-identified] : Ms. KELSY CRANE  is here for a follow up appt for left breast ca, dx 8/28/2020  started neoadjuvant TCHP with onpro on 9/30/2020.  She tested positive for Covid by PCR on 10/30/2020.  A repeat test at 2 weeks was positive again on 11/13/2020.  She was admitted on 11/15/2020 with port site infection.  She was treated with IV antibiotics and was discharged on Zyvox.  Subsequently Covid PCR was done x2 and was negative.  She received cycle 3 on 12/2/2020. S/p admission 12/20/2020-12/28/2020 for port infection, port removed. s/p HP #4 on 1/7 (TC held) and TCHP 4, HP 5 on 1/27. TC # 6, HP# 7 on 3/10/2021.  She continued HP every 3 weeks, HP #13 on 7/7/21. Patient underwent bilateral mastectomies with reconstruction on 7/15/2021.  Left mastectomy showed invasive ductal carcinoma, high-grade, 3.4 cm, dermal lymphovascular invasion present, anterior margin 0.2 cm, tumor involved dermis.  14/18 lymph nodes were positive for metastatic disease, largest metastatic focus measured 2.2 cm with extranodal extension. ypT2 yp N3a. Adjuvant Kadycla started 8/18/21.  Radiation completed 9/20201.  Patient developed peripheral neuropathy after 4 doses of Kadcyla and it was stopped in Nov 2021.  She restarted Herceptin Perjeta x4 cycles to finish 1 year of HP. herceptin perjeta 17/17. Insurance changed herceptin to kanjinti \par  Anastrozole started 10/2021\par \par Takes Anastrozole daily, good compliance.  She has mild stiffness in her fingers.  She is using Voltaren gel with relief.  She denies aches/pain, hot flashes, vag dryness, excessive fatigue, GI s/e, hair loss. She is active, no change in energy, wt or appetite. \par mammogram -   not needed\par Neuropathy has improved since stopping Kadcyla.\par no lymphedema, excellent ROM, doing jair\par DEXA- not done reminded schedule .will check vit D level today She takes ca+vit D\par echo 9/2021, 12/2021-repeat in 1 year 12/2022\par PICC line removed \par 08/17/2021 PETCT ALEXX, Nonspecific few small foci of mild FDG activity adjacent to surgical clips in left axilla.  \par 02/23/2022 PET CT  New mildly FDG-avid peripheral pulmonary opacities in left upper lobe and lingula and new 1 cm cyst within peripheral left upper lobe opacity probably are related to interval radiation therapy. No scan evidence of metastatic disease.  \par CT chest 6/2022: improvement i RT changes\par significant other x 15 yrs passed on vacation\par

## 2022-07-25 ENCOUNTER — NON-APPOINTMENT (OUTPATIENT)
Age: 62
End: 2022-07-25

## 2022-08-08 ENCOUNTER — APPOINTMENT (OUTPATIENT)
Dept: CARDIOLOGY | Facility: CLINIC | Age: 62
End: 2022-08-08

## 2022-08-08 PROCEDURE — 93306 TTE W/DOPPLER COMPLETE: CPT

## 2022-08-23 ENCOUNTER — NON-APPOINTMENT (OUTPATIENT)
Age: 62
End: 2022-08-23

## 2022-08-25 ENCOUNTER — APPOINTMENT (OUTPATIENT)
Dept: PLASTIC SURGERY | Facility: AMBULATORY SURGERY CENTER | Age: 62
End: 2022-08-25
Payer: COMMERCIAL

## 2022-08-25 PROCEDURE — 19380 REVJ RECONSTRUCTED BREAST: CPT | Mod: LT

## 2022-08-25 PROCEDURE — 14001 TIS TRNFR TRUNK 10.1-30SQCM: CPT

## 2022-08-30 ENCOUNTER — APPOINTMENT (OUTPATIENT)
Dept: PLASTIC SURGERY | Facility: CLINIC | Age: 62
End: 2022-08-30

## 2022-08-30 VITALS
SYSTOLIC BLOOD PRESSURE: 124 MMHG | BODY MASS INDEX: 27.6 KG/M2 | DIASTOLIC BLOOD PRESSURE: 81 MMHG | HEIGHT: 62 IN | WEIGHT: 150 LBS | TEMPERATURE: 98 F | HEART RATE: 75 BPM | OXYGEN SATURATION: 97 %

## 2022-08-30 DIAGNOSIS — Z09 ENCOUNTER FOR FOLLOW-UP EXAMINATION AFTER COMPLETED TREATMENT FOR CONDITIONS OTHER THAN MALIGNANT NEOPLASM: ICD-10-CM

## 2022-08-30 PROCEDURE — 99024 POSTOP FOLLOW-UP VISIT: CPT

## 2022-08-30 RX ORDER — DOCUSATE SODIUM 100 MG/1
100 CAPSULE ORAL TWICE DAILY
Qty: 20 | Refills: 0 | Status: DISCONTINUED | COMMUNITY
Start: 2021-07-02 | End: 2022-08-30

## 2022-08-30 RX ORDER — ONDANSETRON 4 MG/1
4 TABLET, ORALLY DISINTEGRATING ORAL
Qty: 9 | Refills: 0 | Status: DISCONTINUED | COMMUNITY
Start: 2022-08-03 | End: 2022-08-30

## 2022-08-30 RX ORDER — CELECOXIB 200 MG/1
200 CAPSULE ORAL
Qty: 20 | Refills: 0 | Status: DISCONTINUED | COMMUNITY
Start: 2022-08-03 | End: 2022-08-30

## 2022-08-30 RX ORDER — POLYETHYLENE GLYCOL 3350 17 G/17G
17 POWDER, FOR SOLUTION ORAL
Qty: 119 | Refills: 0 | Status: DISCONTINUED | COMMUNITY
Start: 2022-08-03 | End: 2022-08-30

## 2022-08-30 RX ORDER — ACETAMINOPHEN 500 MG/1
500 TABLET ORAL
Qty: 21 | Refills: 0 | Status: DISCONTINUED | COMMUNITY
Start: 2022-08-03 | End: 2022-08-30

## 2022-08-30 RX ORDER — OXYCODONE 5 MG/1
5 TABLET ORAL
Qty: 12 | Refills: 0 | Status: DISCONTINUED | COMMUNITY
Start: 2022-08-03 | End: 2022-08-30

## 2022-08-30 NOTE — PHYSICAL EXAM
[de-identified] : alert, calm, cooperative\par  [de-identified] : respirations are even and unlabored\par  [de-identified] : bilateral breast incisions are well-approximated, c/d/i, with Dermabond in place.  There is moderate, generalized edema and ecchymosis (right > left).  No signs of wound dehiscence or fluctuance.Breasts are soft, skin with good capillary refill. Bilateral Samir drains are to self suction with serosanguineous fluid appreciated.\par  [de-identified] : abdominal incisions are well-approximated with moderate, generalized edema.  Incisions are c/d/i with Prineo in place.  No signs of wound dehiscence or fluctuance.  \par

## 2022-08-30 NOTE — HISTORY OF PRESENT ILLNESS
[FreeTextEntry1] : Jazlyn is a 61  year old female who presents for a postop evaluation.  Patient is s/p revision of bilateral breast reconstruction and donor site revision on 8/25/22.  She has been healing well and denies any specific complaints or concerns.  Denies pain.  Bilateral breast drain output has been < 25 ml over the past 24 hours.

## 2022-09-06 ENCOUNTER — APPOINTMENT (OUTPATIENT)
Dept: PLASTIC SURGERY | Facility: CLINIC | Age: 62
End: 2022-09-06

## 2022-09-06 VITALS
BODY MASS INDEX: 27.6 KG/M2 | DIASTOLIC BLOOD PRESSURE: 74 MMHG | OXYGEN SATURATION: 97 % | HEIGHT: 62 IN | TEMPERATURE: 97.7 F | SYSTOLIC BLOOD PRESSURE: 118 MMHG | WEIGHT: 150 LBS | HEART RATE: 83 BPM

## 2022-09-06 PROCEDURE — 99024 POSTOP FOLLOW-UP VISIT: CPT

## 2022-09-06 NOTE — HISTORY OF PRESENT ILLNESS
[FreeTextEntry1] : Jazlyn is a 61 year old female who presents for a postop evaluation. Patient is s/p revision of bilateral breast reconstruction and donor site revision on 8/25/22. She has been healing well and denies any specific complaints or concerns. Denies pain.

## 2022-09-06 NOTE — PHYSICAL EXAM
[de-identified] : alert, calm, cooperative\par  [de-identified] : respirations are even and unlabored\par  [de-identified] : bilateral breast incisions are well-approximated, c/d/i, with Dermabond in place.  There is moderate, generalized edema and ecchymosis (right > left), which has improved.  No signs of wound dehiscence or fluctuance.Breasts are soft, skin with good capillary refill\par  [de-identified] : abdominal incisions are well-approximated with moderate, generalized edema.  Incisions are c/d/i, and open to air.  No signs of wound dehiscence or fluctuance.  \par

## 2022-09-08 ENCOUNTER — OUTPATIENT (OUTPATIENT)
Dept: OUTPATIENT SERVICES | Facility: HOSPITAL | Age: 62
LOS: 1 days | Discharge: HOME | End: 2022-09-08

## 2022-09-08 DIAGNOSIS — C50.919 MALIGNANT NEOPLASM OF UNSPECIFIED SITE OF UNSPECIFIED FEMALE BREAST: ICD-10-CM

## 2022-09-08 DIAGNOSIS — Z86.39 PERSONAL HISTORY OF OTHER ENDOCRINE, NUTRITIONAL AND METABOLIC DISEASE: Chronic | ICD-10-CM

## 2022-09-08 DIAGNOSIS — Z98.890 OTHER SPECIFIED POSTPROCEDURAL STATES: Chronic | ICD-10-CM

## 2022-09-12 ENCOUNTER — RESULT REVIEW (OUTPATIENT)
Age: 62
End: 2022-09-12

## 2022-09-12 ENCOUNTER — APPOINTMENT (OUTPATIENT)
Dept: HEMATOLOGY ONCOLOGY | Facility: CLINIC | Age: 62
End: 2022-09-12

## 2022-09-12 VITALS
BODY MASS INDEX: 27.99 KG/M2 | DIASTOLIC BLOOD PRESSURE: 77 MMHG | HEART RATE: 80 BPM | TEMPERATURE: 97.5 F | OXYGEN SATURATION: 98 % | WEIGHT: 152.12 LBS | RESPIRATION RATE: 16 BRPM | HEIGHT: 62 IN | SYSTOLIC BLOOD PRESSURE: 118 MMHG

## 2022-09-12 LAB
BASOPHILS # BLD AUTO: 0.04 K/UL — SIGNIFICANT CHANGE UP (ref 0–0.2)
BASOPHILS NFR BLD AUTO: 0.8 % — SIGNIFICANT CHANGE UP (ref 0–2)
EOSINOPHIL # BLD AUTO: 0.07 K/UL — SIGNIFICANT CHANGE UP (ref 0–0.5)
EOSINOPHIL NFR BLD AUTO: 1.5 % — SIGNIFICANT CHANGE UP (ref 0–6)
HCT VFR BLD CALC: 36.6 % — SIGNIFICANT CHANGE UP (ref 34.5–45)
HGB BLD-MCNC: 11.9 G/DL — SIGNIFICANT CHANGE UP (ref 11.5–15.5)
IMM GRANULOCYTES NFR BLD AUTO: 0.4 % — SIGNIFICANT CHANGE UP (ref 0–1.5)
LYMPHOCYTES # BLD AUTO: 1.13 K/UL — SIGNIFICANT CHANGE UP (ref 1–3.3)
LYMPHOCYTES # BLD AUTO: 23.8 % — SIGNIFICANT CHANGE UP (ref 13–44)
MCHC RBC-ENTMCNC: 32.4 PG — SIGNIFICANT CHANGE UP (ref 27–34)
MCHC RBC-ENTMCNC: 32.5 G/DL — SIGNIFICANT CHANGE UP (ref 32–36)
MCV RBC AUTO: 99.7 FL — SIGNIFICANT CHANGE UP (ref 80–100)
MONOCYTES # BLD AUTO: 0.6 K/UL — SIGNIFICANT CHANGE UP (ref 0–0.9)
MONOCYTES NFR BLD AUTO: 12.7 % — SIGNIFICANT CHANGE UP (ref 2–14)
NEUTROPHILS # BLD AUTO: 2.88 K/UL — SIGNIFICANT CHANGE UP (ref 1.8–7.4)
NEUTROPHILS NFR BLD AUTO: 60.8 % — SIGNIFICANT CHANGE UP (ref 43–77)
NRBC # BLD: 0 /100 WBCS — SIGNIFICANT CHANGE UP (ref 0–0)
PLATELET # BLD AUTO: 222 K/UL — SIGNIFICANT CHANGE UP (ref 150–400)
RBC # BLD: 3.67 M/UL — LOW (ref 3.8–5.2)
RBC # FLD: 14.1 % — SIGNIFICANT CHANGE UP (ref 10.3–14.5)
WBC # BLD: 4.74 K/UL — SIGNIFICANT CHANGE UP (ref 3.8–10.5)
WBC # FLD AUTO: 4.74 K/UL — SIGNIFICANT CHANGE UP (ref 3.8–10.5)

## 2022-09-12 PROCEDURE — 99214 OFFICE O/P EST MOD 30 MIN: CPT

## 2022-09-12 RX ORDER — OMEPRAZOLE 40 MG/1
40 CAPSULE, DELAYED RELEASE ORAL
Qty: 30 | Refills: 0 | Status: ACTIVE | COMMUNITY
Start: 2020-09-29 | End: 1900-01-01

## 2022-09-12 NOTE — REASON FOR VISIT
[Follow-Up Visit] : a follow-up [Other: _____] : [unfilled] [FreeTextEntry2] : ER+ 80%,ND+ 50%, HER 2 + breast cancer

## 2022-09-12 NOTE — ASSESSMENT
[Curative] : Goals of care discussed with patient: Curative [FreeTextEntry1] : Ms. KELSY CRANE  is here for a follow up appt for left breast ca, dx 8/28/2020  started neoadjuvant TCHP with onpro on 9/30/2020.  She tested positive for Covid by PCR on 10/30/2020.  A repeat test at 2 weeks was positive again on 11/13/2020.  She was admitted on 11/15/2020 with port site infection.  She was treated with IV antibiotics and was discharged on Zyvox.  Subsequently Covid PCR was done x2 and was negative.  She received cycle 3 on 12/2/2020. S/p admission 12/20/2020-12/28/2020 for port infection, port removed. s/p HP #4 on 1/7 (TC held) and TCHP 4, HP 5 on 1/27. TC # 6, HP# 7 on 3/10/2021.  She continued HP every 3 weeks, HP #13 on 7/7/21. Patient underwent bilateral mastectomies with reconstruction on 7/15/2021.  Left mastectomy showed invasive ductal carcinoma, high-grade, 3.4 cm, dermal lymphovascular invasion present, anterior margin 0.2 cm, tumor involved dermis.  14/18 lymph nodes were positive for metastatic disease, largest metastatic focus measured 2.2 cm with extranodal extension. ypT2 yp N3a. Adjuvant Kadycla started 8/18/21.  Radiation completed 9/20201.  Patient developed peripheral neuropathy after 4 doses of Kadcyla and it was stopped in Nov 2021.  She restarted Herceptin Perjeta x4 cycles to finish 1 year of HP.  Anastrozole started 10/2021\par \par BREAST CANCER: ER POSITIVE/HER-2/USHA POSITIVE.  Status post neoadjuvant chemotherapy with significant residual disease.  Status post bilateral mastectomy 7/2021.  Patient started Kadcyla 8/2021.  Status post 4 cycles.  She developed peripheral neuropathy, mild pancytopenia and transaminitis secondary to Kadcyla.  Kadcyla was stopped and she completed a year of HP. Patient has high risk node positive, HER-2 positive breast cancer.  Patient was not able to get Kadcyla therefore we discussed neratinib but she does not want additional medications as she is concerned about side effects..\par \par Ms. KELSY CRANE  is tolerating AI well, good compliance. She has mild side effects from the treatment. Continue endocrine x 10 yrs due to high risk node positive disease.  We could consider addition of neratinib after HP but patient does not want additional medications.\par \par \par -CHEMO INDUCED PERIPHERAL NEUROPATHY: Improved with discontinuation of treatment\par \par -Pulmonary nodules: 08/17/2021 PETCT ALEXX, Nonspecific few small foci of mild FDG activity adjacent to surgical clips in left axilla.  \par 02/23/2022 PET CT  New mildly FDG-avid peripheral pulmonary opacities in left upper lobe and lingula and new 1 cm cyst within peripheral left upper lobe opacity probably are related to interval radiation therapy. No scan evidence of metastatic disease.  \par CT chest 6/2022: improvement i RT changes\par repeat scan in  1y\par \par - Concern for worsening bone density and fractures due to anastrozole. Rec to  continue calcium and vit D. DEXA 1-2 yrs. \par - High cholesterol/CAD risk factors: Concern for worsening cholesterol/CAD risk factors due to anastrozole. Pt is on simvastatin . Lipid profile annually. Life style modifications d/w her.\par -Mild aromatase inhibitors-induced arthralgias: Recommend stretching exercises.  Will consider treatment break and switching to exemestane if symptoms worsen.\par - GERD- ppi prescribed, rec tosee GI \par 	\par RTO 6 m

## 2022-09-12 NOTE — HISTORY OF PRESENT ILLNESS
[de-identified] : Ms.Joanne Damon is a 60 year old female here for an evaluation of breast cancer. Her oncologic history is as follows:\par \par Patient noticed left breast mass for 4 months and presented to plastic surgeon's office. She was sent for breast imaging on 8/19/2020 BIRADS 5 which showed new irregular mass with calcifications and nipple retraction in the left retroareolar location on mammography, corresponding to a mass in the left breast 6:00 location 2 cm from the nipple on the concurrent breast ultrasound. Ultrasound-guided core biopsy is recommended for diagnosis.\par - Abnormal left axillary lymph nodes on mammography and sonography, including lymph nodes demonstrating microcalcifications. Ultrasound-guided core biopsy of a representative abnormal appearing left axillary lymph node with calcifications is recommended.\par - Faint loosely grouped calcifications in the upper central left breast and the upper central right breast which are indeterminate. Sampling stereotactic biopsy of both breasts is recommended.\par - Loosely grouped predominantly coarse dystrophic calcifications associated with areas of central lucency in the posterior medial right breast which are thought to represent fat necrosis. Six-month follow-up right diagnostic mammography is recommended. She underwent New irregular mass with calcifications and nipple retraction in the left retroareolar location on mammography, corresponding to a mass in the left breast 6:00 location 2 cm from the nipple on the concurrent breast ultrasound. Ultrasound-guided core biopsy is recommended for diagnosis.\par  She had a left, 6 o'clock, 2 cm FN, core biopsy on on 8/28/2020 which showed Invasive poorly differentiated ductal carcinoma with areas of micropapillary features\par - [place] score 9/9 (3 + 3 + 3)\par - Invasive tumor measures at least 1.0 cm\par - Ductal carcinoma in situ (focal), cribriform pattern with\par high nuclear grade and central necrosis\par \par  Breast, left, axillary lymph node, core biopsy\par - Invasive poorly differentiated ductal carcinoma with mild lymphocytic infiltrate\par ER+ 80%,MS+ 50%, HER 2 + breast cancer\par \par She has been on Prempro x 7 years for hot flashes. She is off now x 2 weeks. Hot flashes are ok\par She had breast augmentation with Dr Geiger. Implants exchanged 2019.  Benign biopsy in 2019\par \par 9/30/2020\par 9/25/2020 ECHO LVEF 57%\par PETCT SKUL-THI ONC  09/18/2020 No evidence of distant metastasis.\par EKG 2/2020 faxed by PCP , repeat 9/30/2020 done , Chemo consent obtained \par Right side Medport in place, catheter tip at the distal SVC\par \par 8/4/2021\par We discussed pathology report in detail today.  We reviewed that she completed chemo in March 2021.  Follow-up breast MRI showed partial response in the breast and pathologic lymph node had normalized in appearance.  She had significant delay until she finally had surgery in July. Patient underwent bilateral mastectomies with reconstruction on 7/15/2021.  Left mastectomy showed invasive ductal carcinoma, high-grade, 3.4 cm, dermal lymphovascular invasion present, anterior margin 0.2 cm, tumor involved dermis.  14/18 lymph nodes were positive for metastatic disease, largest metastatic focus measured 2.2 cm with extranodal extension. ypT2 yp N3a.  She had multiple positive lymph nodes and is at high risk for local and distant recurrence.\par We discussed that she is a candidate for postmastectomy radiation therapy.  Patient was very upset and tearful to hear that.  She said she underwent bilateral mastectomies in the hope that she will not need any further treatment.  We discussed that we will not expecting to see so many positive lymph nodes but ER positive disease also does not usually have complete response to neoadjuvant chemo.  I reviewed that typically postmastectomy radiation is considered standard of care for patients with more than 3+ lymph nodes.  She agreed to at least have a consultation with radiation oncology.  Email referral was sent.\par \par We discussed data from Bernadette study which showed benefit from TDM 1 in patients with residual disease.  I had discussed the same with the patient prior to surgery and last week over the phone.  She feels her body is okay with medications and she cannot take it anymore.  She is hoping to finish 4 more Herceptin Perjeta to finish 1 year of HER-2 therapy.  Side effects of Kadcyla were reviewed and patient was reassured that side effects are mostly very manageable.  She was again very overwhelmed and tearful and did not want to switch to TDM 1.  She does not have PICC line or port and was concerned about TDM 1x14 cycles.  She wanted to get HP today.\par \par We discussed the data for neratinib in high risk HER-2 positive patients.  If she does not agree for TDM 1, she will be a candidate for neratinib.  We discussed that even though it is a pill treatment, GI toxicity is significant.\par \par We also discussed that she will take aromatase inhibitors for 10 years after finishing radiation.\par Patient is very reluctant and hesitant to take postmastectomy radiation and Kadcyla which I believe is necessary to keep her disease-free and long-term.  I recommend that she also seek a second opinion.  She was also encouraged to participate in clinical trials for adjuvant HER-2 positive breast cancer, if offered by second opinion oncologist.\par \par Addendum: Patient went to treatment room but then refused treatment with HP.  We will check with her in a week and reschedule either HP or TDM 1 depending on her decision.\par \par 8/18/21\par Patient here to start Kadcyla (TDM 1) x14 cycles q 3 weeks tx #1/14 today Counts good\par Patient expressed concern about hair loss with Kadcyla I explained that hair loss is usually not seen with Kadcyla chemo s/e discussed\par Medication for symptom management reviewed and questions answered\par Discussed 08/17/2021 PETCT ALEXX, Nonspecific few small foci of mild FDG activity adjacent to surgical clips in left axilla Will repeat PET CT after completion of TDM1 x 14 cycles \par Last ECHO 5/24/21 EF 58%. Reminded her to schedule next ECHO  this month.\par Chemo consent obtained she is scheduled for PICC line insertion \par Patient is a anxious about PICC line, will give Xanax for use pre procedure\par Emotional support given\par \par 10/2021\par Patient completed radiation therapy 2 weeks ago.  He has mild fatigue and skin reaction.  Otherwise overall doing well.  She reports mild numbness and tingling occasionally in her fingertips and toes.  We discussed possibility of Kadcyla induced neuropathy.  No pain.  The symptoms are inconsistent and are not affecting her quality of life at this point therefore we will continue Kadcyla but monitor her closely.\par We discussed to start endocrine therapy with anastrozole.  Side effects were reviewed.\par \par \par 11/2021\par peripheral neuropathy worse\par likely 2/2 kadcyla\par Rec to stop kadcyla at this point \par She had 13 cycles oh HP, will continue 4 more to finish 1 year\par She has mild stiffness in fingers. Continue AI for now\par Monitor closely. If worsen, will switch to exemestane. \par \par Patient has high risk HER-2 positive breast cancer.  Patient was not able to get Kadcyla therefore we discussed neratinib.  She will follow-up with me in 1 to 2 months.  If patient is agreeable, we will start neratinib.\par 4/11/2022 We discussed starting neratinib but she does not want additional medications as she is concerned about side effects. [de-identified] : Ms. KELSY CRANE  is here for a follow up appt for left breast ca, dx 8/28/2020  started neoadjuvant TCHP with onpro on 9/30/2020.  She tested positive for Covid by PCR on 10/30/2020.  A repeat test at 2 weeks was positive again on 11/13/2020.  She was admitted on 11/15/2020 with port site infection.  She was treated with IV antibiotics and was discharged on Zyvox.  Subsequently Covid PCR was done x2 and was negative.  She received cycle 3 on 12/2/2020. S/p admission 12/20/2020-12/28/2020 for port infection, port removed. s/p HP #4 on 1/7 (TC held) and TCHP 4, HP 5 on 1/27. TC # 6, HP# 7 on 3/10/2021.  She continued HP every 3 weeks, HP #13 on 7/7/21. Patient underwent bilateral mastectomies with reconstruction on 7/15/2021.  Left mastectomy showed invasive ductal carcinoma, high-grade, 3.4 cm, dermal lymphovascular invasion present, anterior margin 0.2 cm, tumor involved dermis.  14/18 lymph nodes were positive for metastatic disease, largest metastatic focus measured 2.2 cm with extranodal extension. ypT2 yp N3a. Adjuvant Kadycla started 8/18/21.  Radiation completed 9/20201.  Patient developed peripheral neuropathy after 4 doses of Kadcyla and it was stopped in Nov 2021.  She restarted Herceptin Perjeta x4 cycles to finish 1 year of HP. herceptin perjeta x 1 yr completed. Insurance changed herceptin to kanjinti \par  Anastrozole started 10/2021\par \par Takes Anastrozole daily, good compliance.  She has mild stiffness in her fingers.  She is using Voltaren gel with relief.  She denies aches/pain, hot flashes, vag dryness, excessive fatigue, GI s/e, hair loss. She is active, no change in energy, wt or appetite. \par She started lexapro and had dexa Dr Holt - emailed fco to get results\par mammogram -   not needed\par DEXA - osteopenia , She takes ca+vit D\par Neuropathy has improved since stopping Kadcyla.\par no lymphedema, excellent ROM, doing jair\par echo 9/2021, 12/2021, 8/2022\par PICC line removed \par 08/17/2021 PETCT ALEXX, Nonspecific few small foci of mild FDG activity adjacent to surgical clips in left axilla.  \par 02/23/2022 PET CT  New mildly FDG-avid peripheral pulmonary opacities in left upper lobe and lingula and new 1 cm cyst within peripheral left upper lobe opacity probably are related to interval radiation therapy. No scan evidence of metastatic disease.  \par CT chest 6/2022: improvement in RT changes, likely repeat in 1 y\par

## 2022-09-12 NOTE — PHYSICAL EXAM
[Fully active, able to carry on all pre-disease performance without restriction] : Status 0 - Fully active, able to carry on all pre-disease performance without restriction [Normal] : affect appropriate [de-identified] : Port removed, picc line removed [de-identified] : BL mastectomies healing well, mild post RT changes

## 2022-09-13 LAB
ALBUMIN SERPL ELPH-MCNC: 4.3 G/DL
ALP BLD-CCNC: 58 U/L
ALT SERPL-CCNC: 15 U/L
ANION GAP SERPL CALC-SCNC: 11 MMOL/L
AST SERPL-CCNC: 21 U/L
BILIRUB SERPL-MCNC: 0.3 MG/DL
BUN SERPL-MCNC: 14 MG/DL
CALCIUM SERPL-MCNC: 9.5 MG/DL
CEA SERPL-MCNC: 2.1 NG/ML
CHLORIDE SERPL-SCNC: 105 MMOL/L
CO2 SERPL-SCNC: 26 MMOL/L
CREAT SERPL-MCNC: 0.67 MG/DL
EGFR: 98 ML/MIN/1.73M2
GLUCOSE SERPL-MCNC: 100 MG/DL
POTASSIUM SERPL-SCNC: 4.3 MMOL/L
PROT SERPL-MCNC: 6.9 G/DL
SODIUM SERPL-SCNC: 141 MMOL/L

## 2022-09-14 NOTE — ED PROVIDER NOTE - SKIN [+], MLM
erythema on chest Itraconazole Counseling:  I discussed with the patient the risks of itraconazole including but not limited to liver damage, nausea/vomiting, neuropathy, and severe allergy.  The patient understands that this medication is best absorbed when taken with acidic beverages such as non-diet cola or ginger ale.  The patient understands that monitoring is required including baseline LFTs and repeat LFTs at intervals.  The patient understands that they are to contact us or the primary physician if concerning signs are noted.

## 2022-09-19 LAB — CANCER AG27-29 SERPL-ACNC: 22.3 U/ML

## 2022-09-23 ENCOUNTER — APPOINTMENT (OUTPATIENT)
Dept: PLASTIC SURGERY | Facility: CLINIC | Age: 62
End: 2022-09-23

## 2022-09-23 VITALS
BODY MASS INDEX: 27.97 KG/M2 | HEART RATE: 92 BPM | TEMPERATURE: 98 F | SYSTOLIC BLOOD PRESSURE: 124 MMHG | WEIGHT: 152 LBS | DIASTOLIC BLOOD PRESSURE: 85 MMHG | HEIGHT: 62 IN | OXYGEN SATURATION: 98 %

## 2022-09-23 DIAGNOSIS — Z09 ENCOUNTER FOR FOLLOW-UP EXAMINATION AFTER COMPLETED TREATMENT FOR CONDITIONS OTHER THAN MALIGNANT NEOPLASM: ICD-10-CM

## 2022-09-23 PROCEDURE — 99024 POSTOP FOLLOW-UP VISIT: CPT

## 2022-12-16 ENCOUNTER — APPOINTMENT (OUTPATIENT)
Dept: PLASTIC SURGERY | Facility: CLINIC | Age: 62
End: 2022-12-16

## 2022-12-16 DIAGNOSIS — Z85.3 PERSONAL HISTORY OF MALIGNANT NEOPLASM OF BREAST: ICD-10-CM

## 2022-12-16 PROCEDURE — 11921 CORRECT SKN COLOR 6.1-20.0CM: CPT

## 2022-12-16 PROCEDURE — 11922 CORRECT SKIN COLOR EA 20.0CM: CPT

## 2022-12-16 NOTE — PHYSICAL EXAM
[de-identified] : alert, calm, cooperative\par  [de-identified] : respirations are even and unlabored\par  [de-identified] : bilateral breast incisions are well-approximated, c/d/i, open to air.  There is mild, generalized edema, which has improved.  No signs of wound dehiscence or fluctuance.Breasts are soft, skin with good capillary refill\par  [de-identified] : abdominal incisions are well-approximated with mild generalized edema.  Incisions are c/d/i, and open to air.  No signs of wound dehiscence or fluctuance.  \par

## 2022-12-16 NOTE — HISTORY OF PRESENT ILLNESS
[FreeTextEntry1] : Jazlyn is a 62 year old female who presents for a postop evaluation. Patient is s/p revision of bilateral breast reconstruction and donor site revision on 8/25/22. She has been healing well and denies any specific complaints or concerns. Denies pain. She feels the right breast has stretched slightly.

## 2022-12-21 NOTE — PROCEDURE
[Nl] : None [FreeTextEntry1] : Acquired absence of bilateral breasts; history of breast cancer  [FreeTextEntry2] : Bilateral nipple areola tattoo 3D [FreeTextEntry6] : Patient presents today for b/l 3D NAC tattoo. Patient Reports doing well. No change in H&P. The procedure itself, risks, benefits and alternatives were discussed with the patient at great length. All questions answered to patient’s satisfaction. Informed consent was obtained. Post procedure instructions discussed with patient. Instruction sheet with post procedure directions given to patient. Patient wishes to proceed with the procedure as scheduled today. \par \par The patient was marked in a standing position and markings were confirmed with the patient. The patient was prepped and draped in sterile fashion. Topical anesthesia administered. Tattoo performed; good color uptake; patient tolerated procedure well. \par \par Color mixture: prime L skin 60%; cool yes 20%; middle mixer 10%, pink 10%\par \par Dressings placed. \par \par Patient tolerated procedure well; good uptake\par -cont dressing changes \par -f/u 1-2 weeks or sooner prn\par

## 2023-01-01 NOTE — PATIENT PROFILE ADULT - TRANSPORTATION
Met with Magdy this morning; [facility] so Magdy and I could have some uninterrupted time. She watched the [facility] [employer] Infant CPR videos. 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 I did ask her to rewatch the videos, which she did. I had her do return demonstrations, minimal coaching needed on rate of compressions, which she then corrected. Also reviewed airway blockage in infant. Allowed time for questions, which were all answered.    We then did more gtube education as Antonio is scheduled to have his placed today. Utilized the [employer] teaching doll so mom could have hands on practice time. She is able to connect/disconnect the tube from the button, prime the extension tubing and administer a feed by gravity. Coaching needed regarding how to lower/raise the syringe to control the rate but at the end of session Gaby was demonstrating better technique. Also demonstrated med administration using extension and syringe. Encouraged Magdy to observe how the nurses administer meds today. Explained we would review bolus feeds again tomorrow and move to continuous feeds education as well.     Discussed safe sleep and SIDS prevention with mom. Talked in general about safe household for Antonio and his siblings. Discussed rear facing car seats and car seat laws.   I followed up on the carseat discussion from yesterday. Magdy reported she did not get one yet. Reinforced she must work on getting a car seat because he cannot leave without having a car seat test and car seat of his own. Magdy verbalized understanding.     Magdy was overall attentive during our session. She asked very appropriate questions about his growth and development, which were all answered.   no

## 2023-02-22 ENCOUNTER — APPOINTMENT (OUTPATIENT)
Dept: PLASTIC SURGERY | Facility: CLINIC | Age: 63
End: 2023-02-22
Payer: COMMERCIAL

## 2023-02-22 ENCOUNTER — APPOINTMENT (OUTPATIENT)
Dept: PLASTIC SURGERY | Facility: CLINIC | Age: 63
End: 2023-02-22

## 2023-02-22 VITALS
HEART RATE: 82 BPM | RESPIRATION RATE: 17 BRPM | SYSTOLIC BLOOD PRESSURE: 116 MMHG | DIASTOLIC BLOOD PRESSURE: 78 MMHG | OXYGEN SATURATION: 98 % | WEIGHT: 152 LBS | HEIGHT: 62 IN | BODY MASS INDEX: 27.97 KG/M2

## 2023-02-22 DIAGNOSIS — Z90.13 ACQUIRED ABSENCE OF BILATERAL BREASTS AND NIPPLES: ICD-10-CM

## 2023-02-22 PROCEDURE — 99212 OFFICE O/P EST SF 10 MIN: CPT

## 2023-03-07 ENCOUNTER — OUTPATIENT (OUTPATIENT)
Dept: OUTPATIENT SERVICES | Facility: HOSPITAL | Age: 63
LOS: 1 days | Discharge: HOME | End: 2023-03-07

## 2023-03-07 DIAGNOSIS — Z86.39 PERSONAL HISTORY OF OTHER ENDOCRINE, NUTRITIONAL AND METABOLIC DISEASE: Chronic | ICD-10-CM

## 2023-03-07 DIAGNOSIS — Z98.890 OTHER SPECIFIED POSTPROCEDURAL STATES: Chronic | ICD-10-CM

## 2023-03-07 DIAGNOSIS — C50.919 MALIGNANT NEOPLASM OF UNSPECIFIED SITE OF UNSPECIFIED FEMALE BREAST: ICD-10-CM

## 2023-03-13 ENCOUNTER — APPOINTMENT (OUTPATIENT)
Dept: HEMATOLOGY ONCOLOGY | Facility: CLINIC | Age: 63
End: 2023-03-13
Payer: COMMERCIAL

## 2023-03-13 VITALS
SYSTOLIC BLOOD PRESSURE: 116 MMHG | RESPIRATION RATE: 16 BRPM | OXYGEN SATURATION: 98 % | TEMPERATURE: 97.2 F | WEIGHT: 160 LBS | BODY MASS INDEX: 29.26 KG/M2 | DIASTOLIC BLOOD PRESSURE: 80 MMHG | HEART RATE: 87 BPM

## 2023-03-13 PROCEDURE — 99214 OFFICE O/P EST MOD 30 MIN: CPT

## 2023-03-13 NOTE — ASSESSMENT
[Curative] : Goals of care discussed with patient: Curative [FreeTextEntry1] : Ms. KELSY CRANE  is here for a follow up appt for left breast ca, dx 8/28/2020  started neoadjuvant TCHP with onpro on 9/30/2020.  She tested positive for Covid by PCR on 10/30/2020.  A repeat test at 2 weeks was positive again on 11/13/2020.  She was admitted on 11/15/2020 with port site infection.  She was treated with IV antibiotics and was discharged on Zyvox.  Subsequently Covid PCR was done x2 and was negative.  She received cycle 3 on 12/2/2020. S/p admission 12/20/2020-12/28/2020 for port infection, port removed. s/p HP #4 on 1/7 (TC held) and TCHP 4, HP 5 on 1/27. TC # 6, HP# 7 on 3/10/2021.  She continued HP every 3 weeks, HP #13 on 7/7/21. Patient underwent bilateral mastectomies with reconstruction on 7/15/2021.  Left mastectomy showed invasive ductal carcinoma, high-grade, 3.4 cm, dermal lymphovascular invasion present, anterior margin 0.2 cm, tumor involved dermis.  14/18 lymph nodes were positive for metastatic disease, largest metastatic focus measured 2.2 cm with extranodal extension. ypT2 yp N3a. Adjuvant Kadycla started 8/18/21.  Radiation completed 9/20201.  Patient developed peripheral neuropathy after 4 doses of Kadcyla and it was stopped in Nov 2021.  She restarted Herceptin Perjeta x4 cycles to finish 1 year of HP.  Anastrozole started 10/2021\par \par BREAST CANCER: ER POSITIVE/HER-2/USHA POSITIVE.  Status post neoadjuvant chemotherapy with significant residual disease.  Status post bilateral mastectomy 7/2021.  Patient started Kadcyla 8/2021.  Status post 4 cycles.  She developed peripheral neuropathy, mild pancytopenia and transaminitis secondary to Kadcyla.  Kadcyla was stopped and she completed a year of HP. Patient has high risk node positive, HER-2 positive breast cancer.  Patient was not able to get Kadcyla therefore we discussed neratinib but she does not want additional medications as she is concerned about side effects..\par \par Ms. KELSY CRANE  is tolerating AI well, good compliance. She has mild side effects from the treatment. Continue endocrine x 10 yrs due to high risk node positive disease.  We could consider addition of neratinib after HP but patient does not want additional medications.\par \par -CHEMO INDUCED PERIPHERAL NEUROPATHY: Improved with discontinuation of treatment\par \par -Pulmonary nodules: 08/17/2021 PETCT ALEXX, Nonspecific few small foci of mild FDG activity adjacent to surgical clips in left axilla.  \par 02/23/2022 PET CT  New mildly FDG-avid peripheral pulmonary opacities in left upper lobe and lingula and new 1 cm cyst within peripheral left upper lobe opacity probably are related to interval radiation therapy. No scan evidence of metastatic disease.  \par CT chest 6/2022: improvement in RT changes\par \par - OSTEOPENIA: Concern for worsening bone density and fractures due to anastrozole. Rec to  continue calcium and vit D. DEXA 1-2 yrs. \par - High cholesterol/CAD risk factors: Concern for worsening cholesterol/CAD risk factors due to anastrozole. Pt is on simvastatin . Lipid profile annually. Life style modifications d/w her.\par -Mild aromatase inhibitors-induced arthralgias: Recommend stretching exercises.  Will consider treatment break and switching to exemestane if symptoms worsen.\par - + mild lymphedema, will send for therapy. \par - On exam she has mild left axillary tenderness, will check sono\par \par RTO 6 m

## 2023-03-13 NOTE — HISTORY OF PRESENT ILLNESS
[de-identified] : Ms.Joanne Damon is a 60 year old female here for an evaluation of breast cancer. Her oncologic history is as follows:\par \par Patient noticed left breast mass for 4 months and presented to plastic surgeon's office. She was sent for breast imaging on 8/19/2020 BIRADS 5 which showed new irregular mass with calcifications and nipple retraction in the left retroareolar location on mammography, corresponding to a mass in the left breast 6:00 location 2 cm from the nipple on the concurrent breast ultrasound. Ultrasound-guided core biopsy is recommended for diagnosis.\par - Abnormal left axillary lymph nodes on mammography and sonography, including lymph nodes demonstrating microcalcifications. Ultrasound-guided core biopsy of a representative abnormal appearing left axillary lymph node with calcifications is recommended.\par - Faint loosely grouped calcifications in the upper central left breast and the upper central right breast which are indeterminate. Sampling stereotactic biopsy of both breasts is recommended.\par - Loosely grouped predominantly coarse dystrophic calcifications associated with areas of central lucency in the posterior medial right breast which are thought to represent fat necrosis. Six-month follow-up right diagnostic mammography is recommended. She underwent New irregular mass with calcifications and nipple retraction in the left retroareolar location on mammography, corresponding to a mass in the left breast 6:00 location 2 cm from the nipple on the concurrent breast ultrasound. Ultrasound-guided core biopsy is recommended for diagnosis.\par  She had a left, 6 o'clock, 2 cm FN, core biopsy on on 8/28/2020 which showed Invasive poorly differentiated ductal carcinoma with areas of micropapillary features\par - [place] score 9/9 (3 + 3 + 3)\par - Invasive tumor measures at least 1.0 cm\par - Ductal carcinoma in situ (focal), cribriform pattern with\par high nuclear grade and central necrosis\par \par  Breast, left, axillary lymph node, core biopsy\par - Invasive poorly differentiated ductal carcinoma with mild lymphocytic infiltrate\par ER+ 80%,MT+ 50%, HER 2 + breast cancer\par \par She has been on Prempro x 7 years for hot flashes. She is off now x 2 weeks. Hot flashes are ok\par She had breast augmentation with Dr Geiger. Implants exchanged 2019.  Benign biopsy in 2019\par \par 9/30/2020\par 9/25/2020 ECHO LVEF 57%\par PETCT SKUL-THI ONC  09/18/2020 No evidence of distant metastasis.\par EKG 2/2020 faxed by PCP , repeat 9/30/2020 done , Chemo consent obtained \par Right side Medport in place, catheter tip at the distal SVC\par \par 8/4/2021\par We discussed pathology report in detail today.  We reviewed that she completed chemo in March 2021.  Follow-up breast MRI showed partial response in the breast and pathologic lymph node had normalized in appearance.  She had significant delay until she finally had surgery in July. Patient underwent bilateral mastectomies with reconstruction on 7/15/2021.  Left mastectomy showed invasive ductal carcinoma, high-grade, 3.4 cm, dermal lymphovascular invasion present, anterior margin 0.2 cm, tumor involved dermis.  14/18 lymph nodes were positive for metastatic disease, largest metastatic focus measured 2.2 cm with extranodal extension. ypT2 yp N3a.  She had multiple positive lymph nodes and is at high risk for local and distant recurrence.\par We discussed that she is a candidate for postmastectomy radiation therapy.  Patient was very upset and tearful to hear that.  She said she underwent bilateral mastectomies in the hope that she will not need any further treatment.  We discussed that we will not expecting to see so many positive lymph nodes but ER positive disease also does not usually have complete response to neoadjuvant chemo.  I reviewed that typically postmastectomy radiation is considered standard of care for patients with more than 3+ lymph nodes.  She agreed to at least have a consultation with radiation oncology.  Email referral was sent.\par \par We discussed data from [place] study which showed benefit from TDM 1 in patients with residual disease.  I had discussed the same with the patient prior to surgery and last week over the phone.  She feels her body is okay with medications and she cannot take it anymore.  She is hoping to finish 4 more Herceptin Perjeta to finish 1 year of HER-2 therapy.  Side effects of Kadcyla were reviewed and patient was reassured that side effects are mostly very manageable.  She was again very overwhelmed and tearful and did not want to switch to TDM 1.  She does not have PICC line or port and was concerned about TDM 1x14 cycles.  She wanted to get HP today.\par \par We discussed the data for neratinib in high risk HER-2 positive patients.  If she does not agree for TDM 1, she will be a candidate for neratinib.  We discussed that even though it is a pill treatment, GI toxicity is significant.\par \par We also discussed that she will take aromatase inhibitors for 10 years after finishing radiation.\par Patient is very reluctant and hesitant to take postmastectomy radiation and Kadcyla which I believe is necessary to keep her disease-free and long-term.  I recommend that she also seek a second opinion.  She was also encouraged to participate in clinical trials for adjuvant HER-2 positive breast cancer, if offered by second opinion oncologist.\par \par Addendum: Patient went to treatment room but then refused treatment with HP.  We will check with her in a week and reschedule either HP or TDM 1 depending on her decision.\par \par 8/18/21\par Patient here to start Kadcyla (TDM 1) x14 cycles q 3 weeks tx #1/14 today Counts good\par Patient expressed concern about hair loss with Kadcyla I explained that hair loss is usually not seen with Kadcyla chemo s/e discussed\par Medication for symptom management reviewed and questions answered\par Discussed 08/17/2021 PETCT ALEXX, Nonspecific few small foci of mild FDG activity adjacent to surgical clips in left axilla Will repeat PET CT after completion of TDM1 x 14 cycles \par Last ECHO 5/24/21 EF 58%. Reminded her to schedule next ECHO  this month.\par Chemo consent obtained she is scheduled for PICC line insertion \par Patient is a anxious about PICC line, will give Xanax for use pre procedure\par Emotional support given\par \par 10/2021\par Patient completed radiation therapy 2 weeks ago.  He has mild fatigue and skin reaction.  Otherwise overall doing well.  She reports mild numbness and tingling occasionally in her fingertips and toes.  We discussed possibility of Kadcyla induced neuropathy.  No pain.  The symptoms are inconsistent and are not affecting her quality of life at this point therefore we will continue Kadcyla but monitor her closely.\par We discussed to start endocrine therapy with anastrozole.  Side effects were reviewed.\par \par \par 11/2021\par peripheral neuropathy worse\par likely 2/2 kadcyla\par Rec to stop kadcyla at this point \par She had 13 cycles oh HP, will continue 4 more to finish 1 year\par She has mild stiffness in fingers. Continue AI for now\par Monitor closely. If worsen, will switch to exemestane. \par \par Patient has high risk HER-2 positive breast cancer.  Patient was not able to get Kadcyla therefore we discussed neratinib.  She will follow-up with me in 1 to 2 months.  If patient is agreeable, we will start neratinib.\par 4/11/2022 We discussed starting neratinib but she does not want additional medications as she is concerned about side effects. [de-identified] : Ms. KELSY CRANE  is here for a follow up appt for left breast ca, dx 8/28/2020  started neoadjuvant TCHP with onpro on 9/30/2020.  She tested positive for Covid by PCR on 10/30/2020.  A repeat test at 2 weeks was positive again on 11/13/2020.  She was admitted on 11/15/2020 with port site infection.  She was treated with IV antibiotics and was discharged on Zyvox.  Subsequently Covid PCR was done x2 and was negative.  She received cycle 3 on 12/2/2020. S/p admission 12/20/2020-12/28/2020 for port infection, port removed. s/p HP #4 on 1/7 (TC held) and TCHP 4, HP 5 on 1/27. TC # 6, HP# 7 on 3/10/2021.  She continued HP every 3 weeks, HP #13 on 7/7/21. Patient underwent bilateral mastectomies with reconstruction on 7/15/2021.  Left mastectomy showed invasive ductal carcinoma, high-grade, 3.4 cm, dermal lymphovascular invasion present, anterior margin 0.2 cm, tumor involved dermis.  14/18 lymph nodes were positive for metastatic disease, largest metastatic focus measured 2.2 cm with extranodal extension. ypT2 yp N3a. Adjuvant Kadycla started 8/18/21.  Radiation completed 9/20201.  Patient developed peripheral neuropathy after 4 doses of Kadcyla and it was stopped in Nov 2021.  She restarted Herceptin Perjeta x4 cycles to finish 1 year of HP. herceptin perjeta x 1 yr completed. Insurance changed herceptin to kanjinti \par  Anastrozole started 10/2021\par \par Takes Anastrozole daily, good compliance.  She has mild stiffness in her fingers.  She is using Voltaren gel with relief.  She denies aches/pain, hot flashes, vag dryness, excessive fatigue, GI s/e, hair loss. She is active, no change in energy. She has gained 10 lbs in the last 6 months. She is very stressed due to court case over the house she lived in with her boyfriend. Life styles changes d/w her. She has a gym in her new apartment. \par She started lexapro and is feeling better\par mammogram -   not needed\par DEXA - 7/2022 osteopenia , She takes ca+vit D\par Neuropathy has improved since stopping Kadcyla.\par + mild lymphedema, will send for therapy. \par echo 9/2021, 12/2021, 8/2022\par PICC line removed \par 08/17/2021 PETCT ALEXX, Nonspecific few small foci of mild FDG activity adjacent to surgical clips in left axilla.  \par 02/23/2022 PET CT  New mildly FDG-avid peripheral pulmonary opacities in left upper lobe and lingula and new 1 cm cyst within peripheral left upper lobe opacity probably are related to interval radiation therapy. No scan evidence of metastatic disease.  \par CT chest 6/2022: improvement in RT changes, likely repeat in 1 y\par

## 2023-03-13 NOTE — PHYSICAL EXAM
[Fully active, able to carry on all pre-disease performance without restriction] : Status 0 - Fully active, able to carry on all pre-disease performance without restriction [Normal] : affect appropriate [de-identified] : Port removed, picc line removed [de-identified] : BL mastectomies healing well, mild post RT changes

## 2023-03-14 ENCOUNTER — APPOINTMENT (OUTPATIENT)
Dept: SURGICAL ONCOLOGY | Facility: CLINIC | Age: 63
End: 2023-03-14
Payer: COMMERCIAL

## 2023-03-14 ENCOUNTER — RESULT REVIEW (OUTPATIENT)
Age: 63
End: 2023-03-14

## 2023-03-14 ENCOUNTER — APPOINTMENT (OUTPATIENT)
Dept: HEMATOLOGY ONCOLOGY | Facility: CLINIC | Age: 63
End: 2023-03-14

## 2023-03-14 VITALS
SYSTOLIC BLOOD PRESSURE: 128 MMHG | HEART RATE: 74 BPM | HEIGHT: 62 IN | RESPIRATION RATE: 16 BRPM | WEIGHT: 160 LBS | OXYGEN SATURATION: 97 % | BODY MASS INDEX: 29.44 KG/M2 | DIASTOLIC BLOOD PRESSURE: 82 MMHG

## 2023-03-14 LAB
BASOPHILS # BLD AUTO: 0.04 K/UL — SIGNIFICANT CHANGE UP (ref 0–0.2)
BASOPHILS NFR BLD AUTO: 0.9 % — SIGNIFICANT CHANGE UP (ref 0–2)
EOSINOPHIL # BLD AUTO: 0.04 K/UL — SIGNIFICANT CHANGE UP (ref 0–0.5)
EOSINOPHIL NFR BLD AUTO: 0.9 % — SIGNIFICANT CHANGE UP (ref 0–6)
HCT VFR BLD CALC: 37.2 % — SIGNIFICANT CHANGE UP (ref 34.5–45)
HGB BLD-MCNC: 12.5 G/DL — SIGNIFICANT CHANGE UP (ref 11.5–15.5)
IMM GRANULOCYTES NFR BLD AUTO: 0.2 % — SIGNIFICANT CHANGE UP (ref 0–0.9)
LYMPHOCYTES # BLD AUTO: 1.05 K/UL — SIGNIFICANT CHANGE UP (ref 1–3.3)
LYMPHOCYTES # BLD AUTO: 24.6 % — SIGNIFICANT CHANGE UP (ref 13–44)
MCHC RBC-ENTMCNC: 32.3 PG — SIGNIFICANT CHANGE UP (ref 27–34)
MCHC RBC-ENTMCNC: 33.6 G/DL — SIGNIFICANT CHANGE UP (ref 32–36)
MCV RBC AUTO: 96.1 FL — SIGNIFICANT CHANGE UP (ref 80–100)
MONOCYTES # BLD AUTO: 0.45 K/UL — SIGNIFICANT CHANGE UP (ref 0–0.9)
MONOCYTES NFR BLD AUTO: 10.6 % — SIGNIFICANT CHANGE UP (ref 2–14)
NEUTROPHILS # BLD AUTO: 2.67 K/UL — SIGNIFICANT CHANGE UP (ref 1.8–7.4)
NEUTROPHILS NFR BLD AUTO: 62.8 % — SIGNIFICANT CHANGE UP (ref 43–77)
NRBC # BLD: 0 /100 WBCS — SIGNIFICANT CHANGE UP (ref 0–0)
PLATELET # BLD AUTO: 211 K/UL — SIGNIFICANT CHANGE UP (ref 150–400)
RBC # BLD: 3.87 M/UL — SIGNIFICANT CHANGE UP (ref 3.8–5.2)
RBC # FLD: 13.6 % — SIGNIFICANT CHANGE UP (ref 10.3–14.5)
WBC # BLD: 4.26 K/UL — SIGNIFICANT CHANGE UP (ref 3.8–10.5)
WBC # FLD AUTO: 4.26 K/UL — SIGNIFICANT CHANGE UP (ref 3.8–10.5)

## 2023-03-14 PROCEDURE — 99213 OFFICE O/P EST LOW 20 MIN: CPT

## 2023-03-14 NOTE — HISTORY OF PRESENT ILLNESS
[de-identified] : Ms. Jazlyn Damon is a 62 y/o female presenting for a follow visit s/p bilateral total mastectomies, bilateral axillary sentinel lymph node biopsies, left axillary node dissection on 7/15/21. MADISON reconstruction by Dr. Levi/Dr. Tai. \par Final Pathology revealed:\par 1) right axillary sentinel lymph nodes negative for carcinoma. 0/2\par 2) right breast simple mastectomy- minute focus of ADH. \par 3) left breast simple mastectomy- 3.4 cm invasive ductal carcinoma with focal mucinous and micropapillary features. Dermal lymphovascular invasion present. ADH, negative margins. ER/OR (+) HER2 (+)\par 4) left axilla sentinel lymph biopsy- 3/3 lymph nodes positive for metastatic carcinoma. Extra sherry extension is present. \par 5) right Internal mammary lymph nodes- negative for carcinoma. \par 6)Left Additional axillary contents dissection -11/14 positive for metastatic carcinoma, extranodal extension is present. \par 7) left internal mammary lymph node negative for carcinoma. 0/1\par Tumor stage: pT2 pN3a;  Stage 3 \par \par 8/4/21- Denies pain, fever or chills. Doing well. \par \par She underwent a PET/CT on 8/17/21 which revealed 1. Status post interval bilateral mastectomy with flap reconstruction and left axillary dissection. Diffuse, heterogeneous, mild FDG activity in periphery of bilateral reconstructed breasts probably is related to recent surgery. Few small foci of mild FDG activity adjacent to surgical clips in left axilla are nonspecific. A 3-6 month follow-up FDG-PET/CT may be performed for further evaluation.\par \par Pt Completed radiation in September 2021, Kadcyla was d/c on 11/2021 due to neuropathy and restarted on Herceptin and Perjeta, started on Anastrazole in 10/2021. She completed Herceptin and Perjeta and will remain on Anastrozole for 10 years.  Remains under the care of Dr. Bell.\par \par Denies chest wall masses\par \par \par PERTINENT HISTORY:\par She was seen in initial consultation on 9/2020 for newly diagnosed left breast cancer.  The patient reportedly felt a lump in her left breast for 3 to 4 months and presented to plastic surgeons office. She has no personal history of cancer. She has a history of implant(s) removed in 2019 (Dr. Geiger) and left stereotactic guided biopsy in 2019 - benign. She has been on Prempro x 7 years for hot flashes. \par \par MMG/Sono on 8/19/2020: IMPRESSION:\par - New irregular mass with calcifications and nipple retraction in the left retroareolar location on mammography, corresponding to a mass in the left breast 6:00 location 2 cm from the nipple on the concurrent breast ultrasound. Ultrasound-guided core biopsy is recommended for diagnosis.\par - Abnormal left axillary lymph nodes on mammography and sonography, including lymph nodes demonstrating microcalcifications. Ultrasound-guided core biopsy of a representative abnormal appearing left axillary lymph node with calcifications is recommended.\par - Faint loosely grouped calcifications in the upper central left breast and the upper central right breast which are indeterminate. Sampling stereotactic biopsy of both breasts is recommended.\par - Loosely grouped predominantly coarse dystrophic calcifications associated with areas of central lucency in the posterior medial right breast which are thought to represent fat necrosis. Six-month follow-up right diagnostic mammography is recommended.\par BI-RADS 5\par \par 8/28/2020 : FN core biopsy:\par 1. Breast, left, 6 o'clock, 2 cm FN- Invasive poorly differentiated ductal carcinoma with areas of micropapillary features.  Ductal carcinoma in situ (focal), cribriform pattern with high nuclear grade and central necrosis\par - Lymphovascular permeation by tumor not seen\par -ER/OR/HER2 (+)\par \par 2. Breast, left, axillary lymph node, core biopsy\par - Invasive poorly differentiated ductal carcinoma with mild lymphocytic infiltrate\par \par MRI Breast 9/8/2020- Biopsy proven malignancy in the left retroareolar breast with involvement of the nipple areolar complex. Left axillary lymphadenopathy. Nonmass enhancement in the lower outer right breast for which MRI guided biopsy is recommended. BIRADS 4B. \par \par The patient is also s/p MRI guided core needle biopsy of the right breast on 9/16/2020 with the following pathology: \par -Right breast lower outer quadrant MRI guided bx:  Focal Atypical Duct Hyperplasia, Stromal fibrosis and adenosis\par -Results are concordant. \par \par PET 9/18/2020- Hypermetabolic left retroareolar breast mass and left axillary lymph nodes, corresponding to biopsy proven breast carcinoma and axillary lymph node metastases. No evidence of distant metastases. \par \par She had a right mediport placed for chemotherapy on 9/24/2020 Pt. started neoadjuvant TCHP with onpro on 9/30/2020. She tested positive for Covid by PCR on 10/30/2020. A repeat test at 2 weeks was positive again on 11/13/2020. She was admitted on 11/15/2020 with port site infection. She was treated with IV antibiotics and was discharged on Zyvox. Subsequently Covid PCR was done x2 and was negative. She received cycle 3 on 12/2/2020. S/p admission 12/20/2020-12/28/2020 for port infection, port removed. s/p HP #4 on 1/7 (TC held) and TCHP 4, HP 4 on 1/27. TCHP #5 2/17/21. TCHP # 6 on 3/10/21. She continues Herceptin under the care of Dr. Cassie Bell. \par \par INV[facility]AE Genetic Testing 4/28/2021-  NEGATIVE \par \par MRI Breast to evaluate response to neoadjuvant chemotherapy performed on 4/10/2021 showed partial response to neoadjuvant chemotherapy.  The left breast mass is slightly decreased in size.  The left axillary lymph nodes have decrease in size, and now demonstrate a normal imaging appearance.  This includes the biopsy proven malignant lymph nodes in the lower left axilla which contains susceptibility artifact from a biopsy marker.  BIRADS 6. \par \par Patient presents to the office today to discuss surgical options.  Doing well. \par \par Referring physician: Dr. Geiger\par Heme-onc: Dr. Cassie Bell

## 2023-03-14 NOTE — ASSESSMENT
[FreeTextEntry1] : IMP:\par 60 y/o female diagnosed with left breast Invasive poorly differentiated ductal carcinoma and DCIS (ER/VA/HER2+) with mets to left axillary lymph node; s/p neoadjuvant chemotherapy\par Now s/p bilateral total mastectomies, bilateral axillary sentinel lymph node biopsies, left axillary node dissection on 7/15/21.\par Final Pathology revealed:\par 1) right axillary sentinel lymph nodes negative for carcinoma. 0/2\par 2) right breast simple mastectomy- minute focus of ADH. \par 3) left breast simple mastectomy- 3.4 cm invasive ductal carcinoma with focal mucinous and micropapillary features. Dermal lymphovascular invasion present. ADH, negative margins. ER/VA (+) HER2 (+)\par 4) left axilla sentinel lymph biopsy- 3/3 lymph nodes positive for metastatic carcinoma. Extra sherry extension is present. \par 5) right Internal mammary lymph nodes- negative for carcinoma. \par 6)Left Additional axillary contents dissection -11/14 positive for metastatic carcinoma, extranodal extension is present. \par 7) left internal mammary lymph node negative for carcinoma. 0/1\par Tumor stage: pT2 pN3a;  Stage 3 \par MADISON reconstruction - Dr. Levi/Dr. Tai\par On Herceptin and Perjeta under the care of Dr. Bell. - scheduled to complete next week\par Will remain on Anastrozole for 10 years \par \par PLAN:\par Treatment and imaging as per Dr. Bell (Med-onc)\par RTO in 1 year \par \par All medical entries were at my, Dr. Husam Rankin, direction. I have reviewed the chart and agree that the record accurately reflects my personal performance of the history, physical exam, assessment and plan. Our office Nurse Practitioner was present of the duration of the office visit. \par

## 2023-03-14 NOTE — PHYSICAL EXAM
[Normal] : supple, no neck mass and thyroid not enlarged [Normal Supraclavicular Lymph Nodes] : normal supraclavicular lymph nodes [Normal Axillary Lymph Nodes] : normal axillary lymph nodes [Normal] : oriented to person, place and time, with appropriate affect [FreeTextEntry1] : SC present for exam  [de-identified] : Normal S1,S2. Regular rate and rhythm  [de-identified] : s/p bilateral mastectomies with MADISON reconstruction; no palpable chest wall masses  [de-identified] : Clear breath sounds bilaterally with normal respiratory effort.

## 2023-03-16 LAB
ALBUMIN SERPL ELPH-MCNC: 4.4 G/DL
ALP BLD-CCNC: 66 U/L
ALT SERPL-CCNC: 17 U/L
ANION GAP SERPL CALC-SCNC: 13 MMOL/L
AST SERPL-CCNC: 23 U/L
BILIRUB SERPL-MCNC: 0.4 MG/DL
BUN SERPL-MCNC: 12 MG/DL
CALCIUM SERPL-MCNC: 10.3 MG/DL
CANCER AG27-29 SERPL-ACNC: 27.5 U/ML
CEA SERPL-MCNC: 1.4 NG/ML
CHLORIDE SERPL-SCNC: 102 MMOL/L
CO2 SERPL-SCNC: 23 MMOL/L
CREAT SERPL-MCNC: 0.72 MG/DL
EGFR: 93 ML/MIN/1.73M2
GLUCOSE SERPL-MCNC: 120 MG/DL
POTASSIUM SERPL-SCNC: 4.1 MMOL/L
PROT SERPL-MCNC: 7.3 G/DL
SODIUM SERPL-SCNC: 138 MMOL/L

## 2023-03-24 PROBLEM — Z90.13 ABSENCE OF BREAST, ACQUIRED, BILATERAL: Status: ACTIVE | Noted: 2021-07-27

## 2023-03-27 NOTE — HISTORY OF PRESENT ILLNESS
[FreeTextEntry1] : Patient presents today for routine f/u and seeking evaluation for possible touch up of NAC tattoo. Pt reports slight color discrepancy between her NAC colors. She has history of left breast RT. No other complaints.

## 2023-03-27 NOTE — PHYSICAL EXAM
[de-identified] : b/l NAC tattoo's well healed, good pigment uptake. Minimal difference in color which is secondary to radiated breast being darker than non radiated breast.

## 2023-08-29 NOTE — H&P PST ADULT - LIVING CHILDREN, OB PROFILE
Patient is here for her Post partum. She gave birth on 07/20/23. Her Weight is 169.2 lb and BP is 125/76. She has not had LMP since giving birth, She has random spotting. Her Last Pap was done on 02/15/23. She would like to see if she go hiking and swimming. She wants to see if she is cleared to do activities. Her EPDS score is 0.  
Postpartum Visit    Lina Poon    31 y.o.    Chief complaint    No chief complaint on file.      Patient here for six week post partum check    s/p RCS on 23    Denies excess pain, bleeding, fever or depression/sadness.    EPDS: 0    No problems with bowel/bladder. Some constipation occasionally, taking colace.     Patient is breastfeeding.    Plans for birth control - declines.    PE: /76   Wt 169 lb 3.2 oz   LMP 10/20/2022 (Exact Date)   BMI 24.99 kg/m²     Alert NAD    Breasts: Symmetric, no masses or tenderness.    Abdomen: Soft, no masses, no tenderness; incision well healed    Ext: no edema    Pelvic: Normal external, Speculum exam, normal vagina, no lesions or discharge, cervix normal, no discharge, uterus normal size, nontender.    A/P:    1. Postpartum care following  delivery        No orders of the defined types were placed in this encounter.       6 week post partum check, s/p RCS.    Doing well    Resume normal activities.    Patient has declined birth control.    Return in 1 year for annual.     Tamiko Owens M.D.    Obstetrics and Gynecology    2023 11:24 AM   
0

## 2023-09-08 ENCOUNTER — OUTPATIENT (OUTPATIENT)
Dept: OUTPATIENT SERVICES | Facility: HOSPITAL | Age: 63
LOS: 1 days | Discharge: HOME | End: 2023-09-08

## 2023-09-08 DIAGNOSIS — C50.919 MALIGNANT NEOPLASM OF UNSPECIFIED SITE OF UNSPECIFIED FEMALE BREAST: ICD-10-CM

## 2023-09-08 DIAGNOSIS — Z86.39 PERSONAL HISTORY OF OTHER ENDOCRINE, NUTRITIONAL AND METABOLIC DISEASE: Chronic | ICD-10-CM

## 2023-09-08 DIAGNOSIS — Z98.890 OTHER SPECIFIED POSTPROCEDURAL STATES: Chronic | ICD-10-CM

## 2023-09-18 ENCOUNTER — APPOINTMENT (OUTPATIENT)
Dept: HEMATOLOGY ONCOLOGY | Facility: CLINIC | Age: 63
End: 2023-09-18
Payer: COMMERCIAL

## 2023-09-18 VITALS
TEMPERATURE: 96.6 F | RESPIRATION RATE: 16 BRPM | HEART RATE: 78 BPM | SYSTOLIC BLOOD PRESSURE: 125 MMHG | BODY MASS INDEX: 31.09 KG/M2 | WEIGHT: 170 LBS | DIASTOLIC BLOOD PRESSURE: 87 MMHG | OXYGEN SATURATION: 97 %

## 2023-09-18 DIAGNOSIS — R59.0 LOCALIZED ENLARGED LYMPH NODES: ICD-10-CM

## 2023-09-18 PROCEDURE — 99214 OFFICE O/P EST MOD 30 MIN: CPT

## 2023-09-20 NOTE — ASU PREOP CHECKLIST - HEIGHT IN CM
Encounter Date: 9/20/2023       History     Chief Complaint   Patient presents with    Hand Pain     Left hand pain and swelling    Dizziness     31 year old male presents to ED with complaint of hand pain and dizziness. Patient reports history of hidradenitis with current treatment for left axilla abscesses with Clindamycin. He states he was seen in ED on 9/12 and was given IV antibiotic and prescription for PO antibiotics and pain medications. Patient states medications and antibiotics have not been effective. He states he stayed off work for 1 day and returned to work with getting off and noting increase in hand swelling and pain. He states he is also having difficulty sleeping due to the pain. He reports chills. No known fever. Spouse reports patient had dizzy episode while at work on today. Patient reports being followed by Dermatologist in [place] and was seen by Surgeon on yesterday for surgical intervention to left axilla.         Review of patient's allergies indicates:  No Known Allergies  Past Medical History:   Diagnosis Date    Cellulitis     Cyst on buttocks    Edema     Herpes simplex viral infection     Hidradenitis suppurativa     High risk sexual behavior     Hypertension     Parageusia     Suppurative hidradenitis      Past Surgical History:   Procedure Laterality Date    TONSILLECTOMY       Family History   Problem Relation Age of Onset    Cancer Other     Diabetes Other     Hypertension Other      Social History     Tobacco Use    Smoking status: Every Day     Current packs/day: 0.50     Average packs/day: 0.5 packs/day for 5.0 years (2.5 ttl pk-yrs)     Types: Cigarettes, Vaping w/o nicotine    Smokeless tobacco: Never   Substance Use Topics    Alcohol use: Not Currently     Comment: occ spirits    Drug use: Yes     Types: Marijuana     Comment: 3/4xs daily     Review of Systems   Constitutional:  Positive for chills. Negative for fever.   HENT:  Negative for congestion, sinus pressure and  sinus pain.    Eyes:  Negative for photophobia and visual disturbance.   Respiratory:  Negative for cough, shortness of breath and stridor.    Cardiovascular:  Negative for chest pain and leg swelling.   Gastrointestinal:  Negative for nausea and vomiting.   Endocrine: Negative for cold intolerance and heat intolerance.   Genitourinary:  Negative for dysuria and urgency.   Musculoskeletal:  Positive for arthralgias and joint swelling.   Skin:  Negative for color change and wound.   Allergic/Immunologic: Negative for environmental allergies and food allergies.   Neurological:  Positive for dizziness. Negative for weakness.   Hematological:  Negative for adenopathy. Does not bruise/bleed easily.   Psychiatric/Behavioral:  Negative for agitation.    All other systems reviewed and are negative.      Physical Exam     Initial Vitals [09/20/23 1514]   BP Pulse Resp Temp SpO2   (!) 149/103 92 16 98.7 °F (37.1 °C) 98 %      MAP       --         Physical Exam    Nursing note and vitals reviewed.  Constitutional: He appears well-developed and well-nourished.   HENT:   Head: Normocephalic and atraumatic.   Eyes: EOM are normal. Pupils are equal, round, and reactive to light.   Neck: Neck supple.   Normal range of motion.  Cardiovascular:  Normal rate and regular rhythm.           No murmur heard.  Pulmonary/Chest: He has no wheezes. He has no rhonchi.   Abdominal: Abdomen is soft. He exhibits no distension. There is no abdominal tenderness.   Musculoskeletal:         General: Tenderness and edema present.      Left hand: Swelling and tenderness present.        Hands:       Cervical back: Normal range of motion and neck supple.      Comments: Firm, darkened area to hand. Warmth. No erythema visualized     Lymphadenopathy:     He has no cervical adenopathy.   Neurological: He is alert and oriented to person, place, and time. No cranial nerve deficit or sensory deficit.   Skin: Skin is warm. Capillary refill takes less than 2  seconds. No erythema.   Psychiatric: He has a normal mood and affect. Thought content normal.         Medical Screening Exam   See Full Note    ED Course   Procedures  Labs Reviewed   COMPREHENSIVE METABOLIC PANEL - Abnormal; Notable for the following components:       Result Value    Total Protein 8.7 (*)     Globulin 5.1 (*)     AST 11 (*)     All other components within normal limits   C-REACTIVE PROTEIN - Abnormal; Notable for the following components:    CRP 3.37 (*)     All other components within normal limits   CBC WITH DIFFERENTIAL - Abnormal; Notable for the following components:    WBC 12.66 (*)     RBC 4.39 (*)     Hemoglobin 12.6 (*)     Hematocrit 39.2 (*)     Neutrophils % 77.7 (*)     Lymphocytes % 15.2 (*)     Neutrophils, Abs 9.83 (*)     All other components within normal limits   CBC W/ AUTO DIFFERENTIAL    Narrative:     The following orders were created for panel order CBC auto differential.  Procedure                               Abnormality         Status                     ---------                               -----------         ------                     CBC with Differential[[id number]]       Abnormal            Final result                 Please view results for these tests on the individual orders.          Imaging Results              X-Ray Hand 3 view Left (Final result)  Result time 09/20/23 16:46:42      Final result by Rebel West MD (09/20/23 16:46:42)                   Impression:      Evidence to suggest inflammatory arthritis involving the PIP joints of the left 4th and 5th digits    Diffuse soft tissue swelling      Electronically signed by: Rebel West  Date:    09/20/2023  Time:    16:46               Narrative:    EXAMINATION:  XR HAND COMPLETE 3 VIEW LEFT    CLINICAL HISTORY:  swelling;.    COMPARISON:  September 12, 2023    TECHNIQUE:  AP, lateral, and oblique views left hand    FINDINGS:  There is prominent soft tissue swelling about the hand    There  is no acute fracture or dislocation.    There is some periarticular joint erosion at the PIP joints of the left ring and little fingers, suggesting inflammatory arthritis.    There is some mild negative ulnar variance                                       Medications   meperidine (PF) injection 25 mg (25 mg Intravenous Given 9/20/23 1609)   ondansetron injection 4 mg (4 mg Intravenous Given 9/20/23 1607)   dexAMETHasone injection 4 mg (4 mg Intravenous Given 9/20/23 1737)   cefTRIAXone (ROCEPHIN) 2 g in dextrose 5 % in water (D5W) 100 mL IVPB (MB+) (0 g Intravenous Stopped 9/20/23 1826)     Medical Decision Making    31 year old male presents to ED with complaint of hand pain and dizziness. Patient reports history of hidradenitis with current treatment for left axilla abscesses with Clindamycin. He states he was seen in ED on 9/12 and was given IV antibiotic and prescription for PO antibiotics and pain medications. Patient states medications and antibiotics have not been effective. He states he stayed off work for 1 day and returned to work with getting off and noting increase in hand swelling and pain. He states he is also having difficulty sleeping due to the pain. He reports chills. No known fever. Spouse reports patient had dizzy episode while at work on today. Patient reports being followed by Dermatologist in [place] and was seen by Surgeon on yesterday for surgical intervention to left axilla.     Labs, diagnostics ordered; IV Demerol, Zofran, Rocephin, Decadron administered.  Prescriptions provided.  Rheumatology referral placed    Amount and/or Complexity of Data Reviewed  Labs: ordered.     Details: Total Protein 8.7 (*)  Globulin 5.1 (*)  AST 11 (*)  All other components within normal limits  C-REACTIVE PROTEIN - Abnormal; Notable for the following components:  CRP 3.37 (*)  All other components within normal limits  CBC WITH DIFFERENTIAL - Abnormal; Notable for the following components:  WBC 12.66  (*)  RBC 4.39 (*)  Hemoglobin 12.6 (*)  Hematocrit 39.2 (*)  Neutrophils % 77.7 (*)  Lymphocytes % 15.2 (*)  Neutrophils, Abs 9.83 (*)    Radiology: ordered.     Details: Evidence to suggest inflammatory arthritis involving the PIP joints of the left 4th and 5th digits    Diffuse soft tissue swelling      Risk  Prescription drug management.                               Clinical Impression:   Final diagnoses:  [M79.642] Left hand pain (Primary)  [M19.90] Inflammatory arthritis        ED Disposition Condition    Discharge Stable          ED Prescriptions       Medication Sig Dispense Start Date End Date Auth. Provider    HYDROcodone-acetaminophen (NORCO) 7.5-325 mg per tablet Take 1 tablet by mouth every 6 (six) hours as needed for Pain. 15 tablet 9/20/2023 -- Yanet Boggs FNP    ondansetron (ZOFRAN) 4 MG tablet Take 1 tablet (4 mg total) by mouth every 6 (six) hours. 12 tablet 9/20/2023 -- Yanet Boggs FNP    methylPREDNISolone (MEDROL DOSEPACK) 4 mg tablet Take as prescribed 1 each 9/20/2023 10/11/2023 Yanet Boggs FNP          Follow-up Information    None          Yanet Boggs FNP  09/29/23 9625     154.94

## 2023-11-14 ENCOUNTER — APPOINTMENT (OUTPATIENT)
Dept: CT IMAGING | Facility: IMAGING CENTER | Age: 63
End: 2023-11-14
Payer: COMMERCIAL

## 2023-11-14 ENCOUNTER — OUTPATIENT (OUTPATIENT)
Dept: OUTPATIENT SERVICES | Facility: HOSPITAL | Age: 63
LOS: 1 days | End: 2023-11-14
Payer: COMMERCIAL

## 2023-11-14 DIAGNOSIS — C50.912 MALIGNANT NEOPLASM OF UNSPECIFIED SITE OF LEFT FEMALE BREAST: ICD-10-CM

## 2023-11-14 DIAGNOSIS — Z86.39 PERSONAL HISTORY OF OTHER ENDOCRINE, NUTRITIONAL AND METABOLIC DISEASE: Chronic | ICD-10-CM

## 2023-11-14 DIAGNOSIS — Z98.890 OTHER SPECIFIED POSTPROCEDURAL STATES: Chronic | ICD-10-CM

## 2023-11-14 PROCEDURE — 71260 CT THORAX DX C+: CPT

## 2023-11-14 PROCEDURE — 71260 CT THORAX DX C+: CPT | Mod: 26

## 2023-11-28 ENCOUNTER — NON-APPOINTMENT (OUTPATIENT)
Age: 63
End: 2023-11-28

## 2023-12-27 NOTE — ED ADULT NURSE NOTE - NS_SISCREENINGSR_GEN_ALL_ED
"Anesthesia Transfer of Care Note    Patient: Vanessa Muhammad    Procedure(s) Performed: Procedure(s) (LRB):  CHOLECYSTECTOMY, LAPAROSCOPIC, WITH CHOLANGIOGRAM (N/A)    Patient location: PACU    Anesthesia Type: general    Transport from OR: Transported from OR on 6-10 L/min O2 by face mask with adequate spontaneous ventilation    Post pain: adequate analgesia    Post assessment: no apparent anesthetic complications    Post vital signs: stable    Level of consciousness: responds to stimulation and awake    Nausea/Vomiting: no nausea/vomiting    Complications: none    Transfer of care protocol was followedComments: Good SV continue, NAD noted, VSS, RTRN      Last vitals: Visit Vitals  BP (!) 129/56   Pulse 86   Temp 36.7 °C (98.1 °F)   Resp 15   Ht 5' 4" (1.626 m)   Wt 9.752 kg (21 lb 8 oz)   SpO2 100%   Breastfeeding No   BMI 3.69 kg/m²     " Negative

## 2024-01-11 ENCOUNTER — APPOINTMENT (OUTPATIENT)
Dept: GASTROENTEROLOGY | Facility: CLINIC | Age: 64
End: 2024-01-11
Payer: COMMERCIAL

## 2024-01-11 VITALS
TEMPERATURE: 97.8 F | BODY MASS INDEX: 34.78 KG/M2 | DIASTOLIC BLOOD PRESSURE: 78 MMHG | HEART RATE: 99 BPM | WEIGHT: 189 LBS | HEIGHT: 62 IN | SYSTOLIC BLOOD PRESSURE: 118 MMHG | OXYGEN SATURATION: 98 % | RESPIRATION RATE: 16 BRPM

## 2024-01-11 DIAGNOSIS — Z12.11 ENCOUNTER FOR SCREENING FOR MALIGNANT NEOPLASM OF COLON: ICD-10-CM

## 2024-01-11 DIAGNOSIS — Z85.3 PERSONAL HISTORY OF MALIGNANT NEOPLASM OF BREAST: ICD-10-CM

## 2024-01-11 DIAGNOSIS — Z86.010 PERSONAL HISTORY OF COLONIC POLYPS: ICD-10-CM

## 2024-01-11 PROCEDURE — 99204 OFFICE O/P NEW MOD 45 MIN: CPT

## 2024-01-11 RX ORDER — SODIUM SULFATE, POTASSIUM SULFATE AND MAGNESIUM SULFATE 1.6; 3.13; 17.5 G/177ML; G/177ML; G/177ML
17.5-3.13-1.6 SOLUTION ORAL TWICE DAILY
Qty: 2 | Refills: 0 | Status: ACTIVE | COMMUNITY
Start: 2024-01-11 | End: 1900-01-01

## 2024-01-11 NOTE — ASSESSMENT
[FreeTextEntry1] : KELSY CRANE was advised to undergo colonoscopy to which she agreed. The procedure will be performed in [facility] with the assistance of an anesthesiologist. The patient was given a Suprep preparation prescription and understood the  procedure as it was explained to her. She was given a booklet distributed by the [employer]  [employer] explaining the procedure in detail and she understood the risks of the procedure not limited to infection, bleeding, perforation or non- diagnosis of colorectal cancer. She was advised that she could not drive home, if she chooses to  receive sedation.  Further diagnostic and treatment recommendations will be based upon the procedure and any biopsies, if they are taken.  Thank you for allowing me to participate in this [facility] care.  , Best personal regards -- Don   I spent 47 minutes with the patient and answered all  her questions

## 2024-01-11 NOTE — HISTORY OF PRESENT ILLNESS
[FreeTextEntry1] : She is an asymptomatic 63-year-old female referred for a screening colonoscopy. She has a past history of colon polyps. Her last colonoscopy was in 2012 in which a few polyps were removed.  She denies a family history of colon cancer or polyps.  She has a past history of breast cancer.

## 2024-03-05 NOTE — PHYSICAL THERAPY INITIAL EVALUATION ADULT - GENERAL OBSERVATIONS, REHAB EVAL
Attended ([id number])    Canalith Repositioning ([id number])     Other:    Other:    Total Timed Code Tx Minutes 25' 2  1     Total Treatment Minutes 50'        Charge Justification:  (65630) THERAPEUTIC EXERCISE - Provided verbal/tactile cueing for activities related to strengthening, flexibility, endurance, ROM performed to prevent loss of range of motion, maintain or improve muscular strength or increase flexibility, following either an injury or surgery.   (50334) HOME EXERCISE PROGRAM - Reviewed/Progressed HEP activities related to strengthening, flexibility, endurance, ROM performed to prevent loss of range of motion, maintain or improve muscular strength or increase flexibility, following either an injury or surgery.  (20889) NEUROMUSCULAR RE-EDUCATION - Therapeutic procedure, 1 or more areas, each 15 minutes; neuromuscular reeducation of movement, balance, coordination, kinesthetic sense, posture, and/or proprioception for sitting and/or standing activities  (57345) THERAPEUTIC ACTIVITY - use of dynamic activities to improve functional performance. (Ex include squatting, ascending/descending stairs, walking, bending, lifting, catching, throwing, pushing, pulling, jumping.)  Direct, one on one contact, billed in 15-minute increments.  (54064) MANUAL THERAPY -  Manual therapy techniques, 1 or more regions, each 15 minutes (Mobilization/manipulation, manual lymphatic drainage, manual traction) for the purpose of modulating pain, promoting relaxation,  increasing ROM, reducing/eliminating soft tissue swelling/inflammation/restriction, improving soft tissue extensibility and allowing for proper ROM for normal function with self care, mobility, lifting and ambulation  (77525) GAIT RE-EDUCATION - Provided training and instruction to the patient for proper joint and muscle recruitment and positioning and eccentric body weight control with ambulation re-education including up and down stairs. Therapeutic procedure, one or more  Patient found supine in bed head of bed elevated in NAD, agreeable to evaluation

## 2024-03-12 ENCOUNTER — OUTPATIENT (OUTPATIENT)
Dept: OUTPATIENT SERVICES | Facility: HOSPITAL | Age: 64
LOS: 1 days | Discharge: HOME | End: 2024-03-12

## 2024-03-12 DIAGNOSIS — C50.919 MALIGNANT NEOPLASM OF UNSPECIFIED SITE OF UNSPECIFIED FEMALE BREAST: ICD-10-CM

## 2024-03-12 DIAGNOSIS — Z98.890 OTHER SPECIFIED POSTPROCEDURAL STATES: Chronic | ICD-10-CM

## 2024-03-12 DIAGNOSIS — Z86.39 PERSONAL HISTORY OF OTHER ENDOCRINE, NUTRITIONAL AND METABOLIC DISEASE: Chronic | ICD-10-CM

## 2024-03-19 ENCOUNTER — APPOINTMENT (OUTPATIENT)
Dept: SURGICAL ONCOLOGY | Facility: CLINIC | Age: 64
End: 2024-03-19
Payer: COMMERCIAL

## 2024-03-19 VITALS
DIASTOLIC BLOOD PRESSURE: 83 MMHG | WEIGHT: 186 LBS | OXYGEN SATURATION: 97 % | HEIGHT: 62 IN | HEART RATE: 94 BPM | SYSTOLIC BLOOD PRESSURE: 131 MMHG | BODY MASS INDEX: 34.23 KG/M2

## 2024-03-19 PROCEDURE — 99213 OFFICE O/P EST LOW 20 MIN: CPT

## 2024-03-19 NOTE — PHYSICAL EXAM
[Normal] : supple, no neck mass and thyroid not enlarged [Normal Supraclavicular Lymph Nodes] : normal supraclavicular lymph nodes [Normal Axillary Lymph Nodes] : normal axillary lymph nodes [Normal] : oriented to person, place and time, with appropriate affect [FreeTextEntry1] : GS present for exam  [de-identified] : Normal S1,S2. Regular rate and rhythm  [de-identified] : s/p bilateral mastectomies with MADISON reconstruction; no palpable chest wall masses  [de-identified] : Clear breath sounds bilaterally with normal respiratory effort.

## 2024-03-19 NOTE — HISTORY OF PRESENT ILLNESS
[de-identified] : Ms. KELSY CRANE is a 63-year-old woman here for a follow-up visit.   She was seen in initial consultation on 9/2020 for newly diagnosed left breast cancer.  The patient reportedly felt a lump in her left breast for 3 to 4 months and presented to plastic surgeon's office. She has a history of implant(s) removed in 2019 (Dr. Geiger) and left stereotactic guided biopsy in 2019 - benign.   completed NACT w/ Dr. Bell 9/2020  [facility] Genetic Testing 4/28/2021- NEGATIVE   s/p bilateral total mastectomies, bilateral axillary sentinel lymph node biopsies, left axillary node dissection on 7/15/2021.  MADISON reconstruction by Dr. Levi/Dr. Tai.  - right breast - minute focus of ALH & fibrocystic changes, 0/2 LN involved - left breast - IDC w/ focal mucinous & micropapillary features, 3.4 cm, LVI present, involves dermis of skin & nipple, margins negative, 14/18 LN positive, ypT2 pN3a, ER+/LA+/HER2+  Completed radiation in September 2021, Aisha was d/c on 11/2021 due to neuropathy and restarted on Herceptin and Perjeta, started on Anastrozole in 10/2021. She completed Herceptin and Perjeta and will remain on Anastrozole for 10 years.  Remains under the care of Dr. Bell.   Referring physician: Dr. Geiger Heme-onc: Dr. Cassie Bell

## 2024-03-19 NOTE — ASSESSMENT
[FreeTextEntry1] : IMP: 64yo F w/ triple positive left IDC & axilla mets, s/p NACT s/p B/L mastectomies 7/2021 & adjuvant Perjeta/Herceptin   Will remain on Anastrozole for 10 years w/ Dr. Bell   PLAN: RTO in 1 year   All medical entries were at my, Dr. Husam Rankin, direction. I have reviewed the chart and agree that the record accurately reflects my personal performance of the history, physical exam, assessment and plan. Our office Nurse Practitioner was present of the duration of the office visit.

## 2024-03-20 ENCOUNTER — RESULT REVIEW (OUTPATIENT)
Age: 64
End: 2024-03-20

## 2024-03-20 ENCOUNTER — APPOINTMENT (OUTPATIENT)
Dept: HEMATOLOGY ONCOLOGY | Facility: CLINIC | Age: 64
End: 2024-03-20
Payer: COMMERCIAL

## 2024-03-20 VITALS
DIASTOLIC BLOOD PRESSURE: 76 MMHG | BODY MASS INDEX: 36.63 KG/M2 | SYSTOLIC BLOOD PRESSURE: 113 MMHG | WEIGHT: 194.01 LBS | HEIGHT: 61.02 IN | HEART RATE: 88 BPM | RESPIRATION RATE: 16 BRPM | OXYGEN SATURATION: 97 %

## 2024-03-20 DIAGNOSIS — C50.912 MALIGNANT NEOPLASM OF UNSPECIFIED SITE OF LEFT FEMALE BREAST: ICD-10-CM

## 2024-03-20 LAB
BASOPHILS # BLD AUTO: 0.04 K/UL — SIGNIFICANT CHANGE UP (ref 0–0.2)
BASOPHILS NFR BLD AUTO: 0.6 % — SIGNIFICANT CHANGE UP (ref 0–2)
EOSINOPHIL # BLD AUTO: 0.07 K/UL — SIGNIFICANT CHANGE UP (ref 0–0.5)
EOSINOPHIL NFR BLD AUTO: 1.1 % — SIGNIFICANT CHANGE UP (ref 0–6)
HCT VFR BLD CALC: 36.8 % — SIGNIFICANT CHANGE UP (ref 34.5–45)
HGB BLD-MCNC: 12.1 G/DL — SIGNIFICANT CHANGE UP (ref 11.5–15.5)
IMM GRANULOCYTES NFR BLD AUTO: 0.5 % — SIGNIFICANT CHANGE UP (ref 0–0.9)
LYMPHOCYTES # BLD AUTO: 1.3 K/UL — SIGNIFICANT CHANGE UP (ref 1–3.3)
LYMPHOCYTES # BLD AUTO: 20.9 % — SIGNIFICANT CHANGE UP (ref 13–44)
MCHC RBC-ENTMCNC: 31.8 PG — SIGNIFICANT CHANGE UP (ref 27–34)
MCHC RBC-ENTMCNC: 32.9 G/DL — SIGNIFICANT CHANGE UP (ref 32–36)
MCV RBC AUTO: 96.8 FL — SIGNIFICANT CHANGE UP (ref 80–100)
MONOCYTES # BLD AUTO: 0.72 K/UL — SIGNIFICANT CHANGE UP (ref 0–0.9)
MONOCYTES NFR BLD AUTO: 11.6 % — SIGNIFICANT CHANGE UP (ref 2–14)
NEUTROPHILS # BLD AUTO: 4.05 K/UL — SIGNIFICANT CHANGE UP (ref 1.8–7.4)
NEUTROPHILS NFR BLD AUTO: 65.3 % — SIGNIFICANT CHANGE UP (ref 43–77)
NRBC # BLD: 0 /100 WBCS — SIGNIFICANT CHANGE UP (ref 0–0)
PLATELET # BLD AUTO: 209 K/UL — SIGNIFICANT CHANGE UP (ref 150–400)
RBC # BLD: 3.8 M/UL — SIGNIFICANT CHANGE UP (ref 3.8–5.2)
RBC # FLD: 13.9 % — SIGNIFICANT CHANGE UP (ref 10.3–14.5)
WBC # BLD: 6.21 K/UL — SIGNIFICANT CHANGE UP (ref 3.8–10.5)
WBC # FLD AUTO: 6.21 K/UL — SIGNIFICANT CHANGE UP (ref 3.8–10.5)

## 2024-03-20 PROCEDURE — G2211 COMPLEX E/M VISIT ADD ON: CPT | Mod: NC,1L

## 2024-03-20 PROCEDURE — 99214 OFFICE O/P EST MOD 30 MIN: CPT

## 2024-03-20 RX ORDER — ANASTROZOLE TABLETS 1 MG/1
1 TABLET ORAL DAILY
Qty: 90 | Refills: 1 | Status: ACTIVE | COMMUNITY
Start: 2021-10-20 | End: 1900-01-01

## 2024-03-20 NOTE — ASSESSMENT
[Curative] : Goals of care discussed with patient: Curative [FreeTextEntry1] : Ms. KELSY CRANE  is here for a follow up appt for left breast ca, dx 8/28/2020  started neoadjuvant TCHP with onpro on 9/30/2020.  She tested positive for Covid by PCR on 10/30/2020.  A repeat test at 2 weeks was positive again on 11/13/2020.  She was admitted on 11/15/2020 with port site infection.  She was treated with IV antibiotics and was discharged on Zyvox.  Subsequently Covid PCR was done x2 and was negative.  She received cycle 3 on 12/2/2020. S/p admission 12/20/2020-12/28/2020 for port infection, port removed. s/p HP #4 on 1/7 (TC held) and TCHP 4, HP 5 on 1/27. TC # 6, HP# 7 on 3/10/2021.  She continued HP every 3 weeks, HP #13 on 7/7/21. Patient underwent bilateral mastectomies with reconstruction on 7/15/2021.  Left mastectomy showed invasive ductal carcinoma, high-grade, 3.4 cm, dermal lymphovascular invasion present, anterior margin 0.2 cm, tumor involved dermis.  14/18 lymph nodes were positive for metastatic disease, largest metastatic focus measured 2.2 cm with extranodal extension. ypT2 yp N3a. Adjuvant Kadycla started 8/18/21.  Radiation completed 9/20201.  Patient developed peripheral neuropathy after 4 doses of Kadcyla and it was stopped in Nov 2021.  She restarted Herceptin Perjeta x4 cycles to finish 1 year of HP.  Anastrozole started 10/2021  BREAST CANCER: ER POSITIVE/HER-2/USHA POSITIVE.  Status post neoadjuvant chemotherapy with significant residual disease.  Status post bilateral mastectomy 7/2021.  Patient started Kadcyla 8/2021.  Status post 4 cycles.  She developed peripheral neuropathy, mild pancytopenia and transaminitis secondary to Kadcyla.  Kadcyla was stopped and she completed a year of HP. Patient has high risk node positive, HER-2 positive breast cancer.  Patient was not able to get Kadcyla therefore we discussed neratinib but she does not want additional medications as she is concerned about side effects..  Ms. KELSY CRAEN  is tolerating AI well, good compliance. She has mild side effects from the treatment. Continue endocrine x 10 yrs due to high risk node positive disease.  We could consider addition of neratinib after HP but patient does not want additional medications.  -CHEMO INDUCED PERIPHERAL NEUROPATHY: Improved with discontinuation of treatment  -Pulmonary nodules: 08/17/2021 PETCT ALEXX, Nonspecific few small foci of mild FDG activity adjacent to surgical clips in left axilla.   02/23/2022 PET CT  New mildly FDG-avid peripheral pulmonary opacities in left upper lobe and lingula and new 1 cm cyst within peripheral left upper lobe opacity probably are related to interval radiation therapy. No scan evidence of metastatic disease.   CT chest 6/2022: improvement in RT changes REPEAT 11/2023 opacities resolved   - OSTEOPENIA: Concern for worsening bone density and fractures due to anastrozole. Rec to  continue calcium and vit D. DEXA 1-2 yrs.  - High cholesterol/CAD risk factors: Concern for worsening cholesterol/CAD risk factors due to anastrozole. Pt is on simvastatin . Lipid profile annually. Life style modifications d/w her. -Mild aromatase inhibitors-induced arthralgias: Recommend stretching exercises.  Will consider treatment break and switching to exemestane if symptoms worsen.   RTO 6 m

## 2024-03-20 NOTE — PHYSICAL EXAM
[Fully active, able to carry on all pre-disease performance without restriction] : Status 0 - Fully active, able to carry on all pre-disease performance without restriction [Normal] : affect appropriate [de-identified] : BL mastectomies healing well, mild post RT changes [de-identified] : Port removed, picc line removed

## 2024-03-20 NOTE — REASON FOR VISIT
[Other: _____] : [unfilled] [Follow-Up Visit] : a follow-up [FreeTextEntry2] : ER+ 80%,NV+ 50%, HER 2 + breast cancer

## 2024-03-20 NOTE — HISTORY OF PRESENT ILLNESS
[de-identified] : Ms. KELSY CRANE  is here for a follow up appt for left breast ca, dx 8/28/2020  started neoadjuvant TCHP with onpro on 9/30/2020.  She tested positive for Covid by PCR on 10/30/2020.  A repeat test at 2 weeks was positive again on 11/13/2020.  She was admitted on 11/15/2020 with port site infection.  She was treated with IV antibiotics and was discharged on Zyvox.  Subsequently Covid PCR was done x2 and was negative.  She received cycle 3 on 12/2/2020. S/p admission 12/20/2020-12/28/2020 for port infection, port removed. s/p HP #4 on 1/7 (TC held) and TCHP 4, HP 5 on 1/27. TC # 6, HP# 7 on 3/10/2021.  She continued HP every 3 weeks, HP #13 on 7/7/21. Patient underwent bilateral mastectomies with reconstruction on 7/15/2021.  Left mastectomy showed invasive ductal carcinoma, high-grade, 3.4 cm, dermal lymphovascular invasion present, anterior margin 0.2 cm, tumor involved dermis.  14/18 lymph nodes were positive for metastatic disease, largest metastatic focus measured 2.2 cm with extranodal extension. ypT2 yp N3a. Adjuvant Kadycla started 8/18/21.  Radiation completed 9/20201.  Patient developed peripheral neuropathy after 4 doses of Kadcyla and it was stopped in Nov 2021.  She restarted Herceptin Perjeta x4 cycles to finish 1 year of HP. herceptin perjeta x 1 yr completed. [facility]   Anastrozole started 10/2021  Takes Anastrozole daily, good compliance.  She has mild stiffness in her fingers.  She is using Voltaren gel with relief.  She denies aches/pain, hot flashes, vag dryness, excessive fatigue, GI s/e, hair loss. She is active, no change in energy. She has gained 20 lbs in the last 6 months. She is very stressed due to court case over the house she lived in with her boyfriend.. She has been very busy at work.  Life styles changes d/w her. She has a gym in her new apartment. She is planning to start wt loss medication with pcp She started lexapro and is feeling better mammogram -   not needed DEXA - 7/2022 osteopenia , She takes ca+vit D Neuropathy has improved since stopping Kadcyla. + mild lymphedema, will send for therapy.  echo 9/2021, 12/2021, 8/2022 PICC line removed  08/17/2021 PETCT ALEXX, Nonspecific few small foci of mild FDG activity adjacent to surgical clips in left axilla.   02/23/2022 PET CT  New mildly FDG-avid peripheral pulmonary opacities in left upper lobe and lingula and new 1 cm cyst within peripheral left upper lobe opacity probably are related to interval radiation therapy. No scan evidence of metastatic disease.   CT chest 6/2022: improvement in RT changes, REPEAT 11/2023  [de-identified] : Ms.Joanne Damon is a 60 year old female here for an evaluation of breast cancer. Her oncologic history is as follows:\par  \par  Patient noticed left breast mass for 4 months and presented to plastic surgeon's office. She was sent for breast imaging on 8/19/2020 BIRADS 5 which showed new irregular mass with calcifications and nipple retraction in the left retroareolar location on mammography, corresponding to a mass in the left breast 6:00 location 2 cm from the nipple on the concurrent breast ultrasound. Ultrasound-guided core biopsy is recommended for diagnosis.\par  - Abnormal left axillary lymph nodes on mammography and sonography, including lymph nodes demonstrating microcalcifications. Ultrasound-guided core biopsy of a representative abnormal appearing left axillary lymph node with calcifications is recommended.\par  - Faint loosely grouped calcifications in the upper central left breast and the upper central right breast which are indeterminate. Sampling stereotactic biopsy of both breasts is recommended.\par  - Loosely grouped predominantly coarse dystrophic calcifications associated with areas of central lucency in the posterior medial right breast which are thought to represent fat necrosis. Six-month follow-up right diagnostic mammography is recommended. She underwent New irregular mass with calcifications and nipple retraction in the left retroareolar location on mammography, corresponding to a mass in the left breast 6:00 location 2 cm from the nipple on the concurrent breast ultrasound. Ultrasound-guided core biopsy is recommended for diagnosis.\par   She had a left, 6 o'clock, 2 cm FN, core biopsy on on 8/28/2020 which showed Invasive poorly differentiated ductal carcinoma with areas of micropapillary features\par  - Estella score 9/9 (3 + 3 + 3)\par  - Invasive tumor measures at least 1.0 cm\par  - Ductal carcinoma in situ (focal), cribriform pattern with\par  high nuclear grade and central necrosis\par  \par   Breast, left, axillary lymph node, core biopsy\par  - Invasive poorly differentiated ductal carcinoma with mild lymphocytic infiltrate\par  ER+ 80%,[place]+ 50%, HER 2 + breast cancer\par  \par  She has been on Prempro x 7 years for hot flashes. She is off now x 2 weeks. Hot flashes are ok\par  She had breast augmentation with Dr Geiger. Implants exchanged 2019.  Benign biopsy in 2019\par  \par  9/30/2020\par  9/25/2020 ECHO LVEF 57%\par  PETCT SKUL-THI ONC  09/18/2020 No evidence of distant metastasis.\par  EKG 2/2020 faxed by PCP , repeat 9/30/2020 done , Chemo consent obtained \par  Right side Medport in place, catheter tip at the distal SVC\par  \par  8/4/2021\par  We discussed pathology report in detail today.  We reviewed that she completed chemo in March 2021.  Follow-up breast MRI showed partial response in the breast and pathologic lymph node had normalized in appearance.  She had significant delay until she finally had surgery in July. Patient underwent bilateral mastectomies with reconstruction on 7/15/2021.  Left mastectomy showed invasive ductal carcinoma, high-grade, 3.4 cm, dermal lymphovascular invasion present, anterior margin 0.2 cm, tumor involved dermis.  14/18 lymph nodes were positive for metastatic disease, largest metastatic focus measured 2.2 cm with extranodal extension. ypT2 yp N3a.  She had multiple positive lymph nodes and is at high risk for local and distant recurrence.\par  We discussed that she is a candidate for postmastectomy radiation therapy.  Patient was very upset and tearful to hear that.  She said she underwent bilateral mastectomies in the hope that she will not need any further treatment.  We discussed that we will not expecting to see so many positive lymph nodes but ER positive disease also does not usually have complete response to neoadjuvant chemo.  I reviewed that typically postmastectomy radiation is considered standard of care for patients with more than 3+ lymph nodes.  She agreed to at least have a consultation with radiation oncology.  Email referral was sent.\par  \par  We discussed data from Bernadette study which showed benefit from TDM 1 in patients with residual disease.  I had discussed the same with the patient prior to surgery and last week over the phone.  She feels her body is okay with medications and she cannot take it anymore.  She is hoping to finish 4 more Herceptin Perjeta to finish 1 year of HER-2 therapy.  Side effects of Kadcyla were reviewed and patient was reassured that side effects are mostly very manageable.  She was again very overwhelmed and tearful and did not want to switch to TDM 1.  She does not have PICC line or port and was concerned about TDM 1x14 cycles.  She wanted to get HP today.\par  \par  We discussed the data for neratinib in high risk HER-2 positive patients.  If she does not agree for TDM 1, she will be a candidate for neratinib.  We discussed that even though it is a pill treatment, GI toxicity is significant.\par  \par  We also discussed that she will take aromatase inhibitors for 10 years after finishing radiation.\par  Patient is very reluctant and hesitant to take postmastectomy radiation and Kadcyla which I believe is necessary to keep her disease-free and long-term.  I recommend that she also seek a second opinion.  She was also encouraged to participate in clinical trials for adjuvant HER-2 positive breast cancer, if offered by second opinion oncologist.\par  \par  Addendum: Patient went to treatment room but then refused treatment with HP.  We will check with her in a week and reschedule either HP or TDM 1 depending on her decision.\par  \par  8/18/21\par  Patient here to start Kadcyla (TDM 1) x14 cycles q 3 weeks tx #1/14 today Counts good\par  Patient expressed concern about hair loss with Kadcyla I explained that hair loss is usually not seen with Kadcyla chemo s/e discussed\par  Medication for symptom management reviewed and questions answered\par  Discussed 08/17/2021 PETCT ALEXX, Nonspecific few small foci of mild FDG activity adjacent to surgical clips in left axilla Will repeat PET CT after completion of TDM1 x 14 cycles \par  Last ECHO 5/24/21 EF 58%. Reminded her to schedule next ECHO  this month.\par  Chemo consent obtained she is scheduled for PICC line insertion \par  Patient is a anxious about PICC line, will give Xanax for use pre procedure\par  Emotional support given\par  \par  10/2021\par  Patient completed radiation therapy 2 weeks ago.  He has mild fatigue and skin reaction.  Otherwise overall doing well.  She reports mild numbness and tingling occasionally in her fingertips and toes.  We discussed possibility of Kadcyla induced neuropathy.  No pain.  The symptoms are inconsistent and are not affecting her quality of life at this point therefore we will continue Kadcyla but monitor her closely.\par  We discussed to start endocrine therapy with anastrozole.  Side effects were reviewed.\par  \par  \par  11/2021\par  peripheral neuropathy worse\par  likely 2/2 kadcyla\par  Rec to stop kadcyla at this point \par  She had 13 cycles oh HP, will continue 4 more to finish 1 year\par  She has mild stiffness in fingers. Continue AI for now\par  Monitor closely. If worsen, will switch to exemestane. \par  \par  Patient has high risk HER-2 positive breast cancer.  Patient was not able to get Kadcyla therefore we discussed neratinib.  She will follow-up with me in 1 to 2 months.  If patient is agreeable, we will start neratinib.\par  4/11/2022 We discussed starting neratinib but she does not want additional medications as she is concerned about side effects.

## 2024-03-21 LAB
ALBUMIN SERPL ELPH-MCNC: 4.2 G/DL
ALP BLD-CCNC: 66 U/L
ALT SERPL-CCNC: 19 U/L
ANION GAP SERPL CALC-SCNC: 13 MMOL/L
AST SERPL-CCNC: 21 U/L
BILIRUB SERPL-MCNC: 0.2 MG/DL
BUN SERPL-MCNC: 18 MG/DL
CALCIUM SERPL-MCNC: 9.5 MG/DL
CEA SERPL-MCNC: 1.1 NG/ML
CHLORIDE SERPL-SCNC: 104 MMOL/L
CO2 SERPL-SCNC: 23 MMOL/L
CREAT SERPL-MCNC: 0.94 MG/DL
EGFR: 67 ML/MIN/1.73M2
GLUCOSE SERPL-MCNC: 115 MG/DL
POTASSIUM SERPL-SCNC: 4.3 MMOL/L
PROT SERPL-MCNC: 6.9 G/DL
SODIUM SERPL-SCNC: 140 MMOL/L

## 2024-03-26 LAB — CANCER AG27-29 SERPL-ACNC: 22.9 U/ML

## 2024-04-29 ENCOUNTER — APPOINTMENT (OUTPATIENT)
Dept: GASTROENTEROLOGY | Facility: AMBULATORY SURGERY CENTER | Age: 64
End: 2024-04-29
Payer: COMMERCIAL

## 2024-04-29 PROCEDURE — 45378 DIAGNOSTIC COLONOSCOPY: CPT

## 2024-07-01 NOTE — DIETITIAN INITIAL EVALUATION ADULT. - PROBLEM SELECTOR PLAN 1
Well Visit- 9 month     Lois is a 9 m.o. female who is brought in by her mom for Well Child (9 month [facility], in office today with mom.)  .  HPI:      Current Concerns:  - No new concerns    Diet:  - Milk Type: bottle  - Amount of Milk: 8-10 ounces 3 x/day  - Food: baby foods and table foods  - Voiding/stooling appropriately    Dental:  - Brushing teeth     ------------------------------------------------------------------------------------------------------  Developmental:  - No concerns about development, behavior, vision, hearing  - SWYC developmental screening negative    [x]Will try to find objects after they're removed from view  [x]Picks up small objects using a 'raking or grabbing' motion with palm downward  [x]Sits unsupported  [x]Crawls or scoots  [x]Pulls to stand  [x]\"Mama/Ghassan\" non specific    Survey of Wellness in Young Children (SWYC) Outcome    SWYC Screening was completed - see nursing notes for detailed report - and results were discussed with the family.  An assessment and plan regarding any positives on development screening can be found elsewhere in the assessment section of the note.     Pediatric Symptom Checklist (PPSC)   Results: Negative  Referral: was not indicated    Family Questions  Were any of the items positive?: No  Referral: was not indicated   ------------------------------------------------------------------------------------------------------     Review of Systems:   Negative except as noted above    Histories:     Patient Active Problem List    Diagnosis Date Noted    Irritant contact dermatitis due to detergent 2024    Cow's milk protein allergy 2023    Single liveborn infant, delivered by  2023      Surgical History:  -  has no past surgical history on file.    Social History     Social History Narrative    Lives with parents, 2 siblings    No pets    No smokers    No guns in home     Birth History    Birth     Length: 50.2 cm (19.75\")      Fever 101.7, tachycardic to 120s. Given cefepime and vanc, IVF in ED  Suspect cellulitis over right chest wall port area, TTP, erythema  -after 2L NS finished, start NS gtt @150cc/hr and assess HR response  -tylenol PRN  -vanc 1g q12 and check trough before 4th dose  -cefepime 2g q8hrs for now pending ID consult  -ID consult in AM  -depending on discussion with ID and oncology - patient may require port replacement given this appears to be a recurrent infection (last admission in November on vanc and discharged on linezolid). On last admission port was left in place  -f/u ESR, CRP  -discuss with ID if further imaging such as TTE or CT rec'd though no fluctuance on exam, does not appear to have palpable abscess but rather cellulitis  -f/u BCx x2 - one was sent from port (on prior admission there was no growth)  -f/u UA, UCx  -CXR clear lungs

## 2024-07-24 NOTE — H&P PST ADULT - LAST PROSTATE EXAM
Render In Strict Bullet Format?: No Initiate Treatment: TMC bid x2 weeks per month as needed Detail Level: Zone n/a Plan: - Recommended free consultation at [facility] for cosmetic lasers therapy. Initiate Treatment: Metronidazole cream bid to affected areas on the face Initiate Treatment: Cat deluxe qhs x2 months on and 1 month off. Repeat as needed.

## 2024-07-25 ENCOUNTER — APPOINTMENT (OUTPATIENT)
Dept: OBGYN | Facility: CLINIC | Age: 64
End: 2024-07-25
Payer: COMMERCIAL

## 2024-07-25 PROCEDURE — 99459 PELVIC EXAMINATION: CPT

## 2024-07-25 PROCEDURE — 99387 INIT PM E/M NEW PAT 65+ YRS: CPT

## 2024-09-13 ENCOUNTER — OUTPATIENT (OUTPATIENT)
Dept: OUTPATIENT SERVICES | Facility: HOSPITAL | Age: 64
LOS: 1 days | Discharge: HOME | End: 2024-09-13

## 2024-09-13 DIAGNOSIS — C50.919 MALIGNANT NEOPLASM OF UNSPECIFIED SITE OF UNSPECIFIED FEMALE BREAST: ICD-10-CM

## 2024-09-13 DIAGNOSIS — Z86.39 PERSONAL HISTORY OF OTHER ENDOCRINE, NUTRITIONAL AND METABOLIC DISEASE: Chronic | ICD-10-CM

## 2024-09-13 DIAGNOSIS — Z98.890 OTHER SPECIFIED POSTPROCEDURAL STATES: Chronic | ICD-10-CM

## 2024-09-19 ENCOUNTER — NON-APPOINTMENT (OUTPATIENT)
Age: 64
End: 2024-09-19

## 2024-09-20 ENCOUNTER — RESULT REVIEW (OUTPATIENT)
Age: 64
End: 2024-09-20

## 2024-09-20 ENCOUNTER — APPOINTMENT (OUTPATIENT)
Dept: HEMATOLOGY ONCOLOGY | Facility: CLINIC | Age: 64
End: 2024-09-20
Payer: COMMERCIAL

## 2024-09-20 VITALS
HEART RATE: 77 BPM | RESPIRATION RATE: 15 BRPM | BODY MASS INDEX: 33.3 KG/M2 | DIASTOLIC BLOOD PRESSURE: 85 MMHG | OXYGEN SATURATION: 95 % | WEIGHT: 176.37 LBS | TEMPERATURE: 98.7 F | SYSTOLIC BLOOD PRESSURE: 123 MMHG

## 2024-09-20 DIAGNOSIS — C50.912 MALIGNANT NEOPLASM OF UNSPECIFIED SITE OF LEFT FEMALE BREAST: ICD-10-CM

## 2024-09-20 LAB
BASOPHILS # BLD AUTO: 0.03 K/UL — SIGNIFICANT CHANGE UP (ref 0–0.2)
BASOPHILS NFR BLD AUTO: 0.6 % — SIGNIFICANT CHANGE UP (ref 0–2)
EOSINOPHIL # BLD AUTO: 0.07 K/UL — SIGNIFICANT CHANGE UP (ref 0–0.5)
EOSINOPHIL NFR BLD AUTO: 1.4 % — SIGNIFICANT CHANGE UP (ref 0–6)
HCT VFR BLD CALC: 40.8 % — SIGNIFICANT CHANGE UP (ref 34.5–45)
HGB BLD-MCNC: 13.2 G/DL — SIGNIFICANT CHANGE UP (ref 11.5–15.5)
IMM GRANULOCYTES NFR BLD AUTO: 0.4 % — SIGNIFICANT CHANGE UP (ref 0–0.9)
LYMPHOCYTES # BLD AUTO: 1.44 K/UL — SIGNIFICANT CHANGE UP (ref 1–3.3)
LYMPHOCYTES # BLD AUTO: 28.1 % — SIGNIFICANT CHANGE UP (ref 13–44)
MCHC RBC-ENTMCNC: 31.1 PG — SIGNIFICANT CHANGE UP (ref 27–34)
MCHC RBC-ENTMCNC: 32.4 G/DL — SIGNIFICANT CHANGE UP (ref 32–36)
MCV RBC AUTO: 96 FL — SIGNIFICANT CHANGE UP (ref 80–100)
MONOCYTES # BLD AUTO: 0.49 K/UL — SIGNIFICANT CHANGE UP (ref 0–0.9)
MONOCYTES NFR BLD AUTO: 9.6 % — SIGNIFICANT CHANGE UP (ref 2–14)
NEUTROPHILS # BLD AUTO: 3.08 K/UL — SIGNIFICANT CHANGE UP (ref 1.8–7.4)
NEUTROPHILS NFR BLD AUTO: 59.9 % — SIGNIFICANT CHANGE UP (ref 43–77)
NRBC # BLD: 0 /100 WBCS — SIGNIFICANT CHANGE UP (ref 0–0)
NRBC BLD-RTO: 0 /100 WBCS — SIGNIFICANT CHANGE UP (ref 0–0)
PLATELET # BLD AUTO: 211 K/UL — SIGNIFICANT CHANGE UP (ref 150–400)
RBC # BLD: 4.25 M/UL — SIGNIFICANT CHANGE UP (ref 3.8–5.2)
RBC # FLD: 13.8 % — SIGNIFICANT CHANGE UP (ref 10.3–14.5)
WBC # BLD: 5.13 K/UL — SIGNIFICANT CHANGE UP (ref 3.8–10.5)
WBC # FLD AUTO: 5.13 K/UL — SIGNIFICANT CHANGE UP (ref 3.8–10.5)

## 2024-09-20 PROCEDURE — 99214 OFFICE O/P EST MOD 30 MIN: CPT

## 2024-09-20 PROCEDURE — G2211 COMPLEX E/M VISIT ADD ON: CPT | Mod: NC

## 2024-09-20 NOTE — PHYSICAL EXAM
[Fully active, able to carry on all pre-disease performance without restriction] : Status 0 - Fully active, able to carry on all pre-disease performance without restriction [Normal] : affect appropriate [de-identified] : Port removed, picc line removed [de-identified] : BL mastectomies healing well, mild post RT changes

## 2024-09-20 NOTE — BEGINNING OF VISIT
[0] : 2) Feeling down, depressed, or hopeless: Not at all (0) [[id number]] : 0 [Pain Scale: ___] : On a scale of 1-10, today the patient's pain is a(n) [unfilled]. [Never] : Never [Date Discussed (MM/DD/YY): ___] : Discussed: [unfilled] [With Patient/Caregiver] : with Patient/Caregiver

## 2024-09-20 NOTE — HISTORY OF PRESENT ILLNESS
[de-identified] : Ms.Joanne Damon is a 60 year old female here for an evaluation of breast cancer. Her oncologic history is as follows:\par  \par  Patient noticed left breast mass for 4 months and presented to plastic surgeon's office. She was sent for breast imaging on 8/19/2020 BIRADS 5 which showed new irregular mass with calcifications and nipple retraction in the left retroareolar location on mammography, corresponding to a mass in the left breast 6:00 location 2 cm from the nipple on the concurrent breast ultrasound. Ultrasound-guided core biopsy is recommended for diagnosis.\par  - Abnormal left axillary lymph nodes on mammography and sonography, including lymph nodes demonstrating microcalcifications. Ultrasound-guided core biopsy of a representative abnormal appearing left axillary lymph node with calcifications is recommended.\par  - Faint loosely grouped calcifications in the upper central left breast and the upper central right breast which are indeterminate. Sampling stereotactic biopsy of both breasts is recommended.\par  - Loosely grouped predominantly coarse dystrophic calcifications associated with areas of central lucency in the posterior medial right breast which are thought to represent fat necrosis. Six-month follow-up right diagnostic mammography is recommended. She underwent New irregular mass with calcifications and nipple retraction in the left retroareolar location on mammography, corresponding to a mass in the left breast 6:00 location 2 cm from the nipple on the concurrent breast ultrasound. Ultrasound-guided core biopsy is recommended for diagnosis.\par   She had a left, 6 o'clock, 2 cm FN, core biopsy on on 8/28/2020 which showed Invasive poorly differentiated ductal carcinoma with areas of micropapillary features\par  - Estella score 9/9 (3 + 3 + 3)\par  - Invasive tumor measures at least 1.0 cm\par  - Ductal carcinoma in situ (focal), cribriform pattern with\par  high nuclear grade and central necrosis\par  \par   Breast, left, axillary lymph node, core biopsy\par  - Invasive poorly differentiated ductal carcinoma with mild lymphocytic infiltrate\par  ER+ 80%,[place]+ 50%, HER 2 + breast cancer\par  \par  She has been on Prempro x 7 years for hot flashes. She is off now x 2 weeks. Hot flashes are ok\par  She had breast augmentation with Dr Geiger. Implants exchanged 2019.  Benign biopsy in 2019\par  \par  9/30/2020\par  9/25/2020 ECHO LVEF 57%\par  PETCT SKUL-THI ONC  09/18/2020 No evidence of distant metastasis.\par  EKG 2/2020 faxed by PCP , repeat 9/30/2020 done , Chemo consent obtained \par  Right side Medport in place, catheter tip at the distal SVC\par  \par  8/4/2021\par  We discussed pathology report in detail today.  We reviewed that she completed chemo in March 2021.  Follow-up breast MRI showed partial response in the breast and pathologic lymph node had normalized in appearance.  She had significant delay until she finally had surgery in July. Patient underwent bilateral mastectomies with reconstruction on 7/15/2021.  Left mastectomy showed invasive ductal carcinoma, high-grade, 3.4 cm, dermal lymphovascular invasion present, anterior margin 0.2 cm, tumor involved dermis.  14/18 lymph nodes were positive for metastatic disease, largest metastatic focus measured 2.2 cm with extranodal extension. ypT2 yp N3a.  She had multiple positive lymph nodes and is at high risk for local and distant recurrence.\par  We discussed that she is a candidate for postmastectomy radiation therapy.  Patient was very upset and tearful to hear that.  She said she underwent bilateral mastectomies in the hope that she will not need any further treatment.  We discussed that we will not expecting to see so many positive lymph nodes but ER positive disease also does not usually have complete response to neoadjuvant chemo.  I reviewed that typically postmastectomy radiation is considered standard of care for patients with more than 3+ lymph nodes.  She agreed to at least have a consultation with radiation oncology.  Email referral was sent.\par  \par  We discussed data from Bernadette study which showed benefit from TDM 1 in patients with residual disease.  I had discussed the same with the patient prior to surgery and last week over the phone.  She feels her body is okay with medications and she cannot take it anymore.  She is hoping to finish 4 more Herceptin Perjeta to finish 1 year of HER-2 therapy.  Side effects of Kadcyla were reviewed and patient was reassured that side effects are mostly very manageable.  She was again very overwhelmed and tearful and did not want to switch to TDM 1.  She does not have PICC line or port and was concerned about TDM 1x14 cycles.  She wanted to get HP today.\par  \par  We discussed the data for neratinib in high risk HER-2 positive patients.  If she does not agree for TDM 1, she will be a candidate for neratinib.  We discussed that even though it is a pill treatment, GI toxicity is significant.\par  \par  We also discussed that she will take aromatase inhibitors for 10 years after finishing radiation.\par  Patient is very reluctant and hesitant to take postmastectomy radiation and Kadcyla which I believe is necessary to keep her disease-free and long-term.  I recommend that she also seek a second opinion.  She was also encouraged to participate in clinical trials for adjuvant HER-2 positive breast cancer, if offered by second opinion oncologist.\par  \par  Addendum: Patient went to treatment room but then refused treatment with HP.  We will check with her in a week and reschedule either HP or TDM 1 depending on her decision.\par  \par  8/18/21\par  Patient here to start Kadcyla (TDM 1) x14 cycles q 3 weeks tx #1/14 today Counts good\par  Patient expressed concern about hair loss with Kadcyla I explained that hair loss is usually not seen with Kadcyla chemo s/e discussed\par  Medication for symptom management reviewed and questions answered\par  Discussed 08/17/2021 PETCT ALEXX, Nonspecific few small foci of mild FDG activity adjacent to surgical clips in left axilla Will repeat PET CT after completion of TDM1 x 14 cycles \par  Last ECHO 5/24/21 EF 58%. Reminded her to schedule next ECHO  this month.\par  Chemo consent obtained she is scheduled for PICC line insertion \par  Patient is a anxious about PICC line, will give Xanax for use pre procedure\par  Emotional support given\par  \par  10/2021\par  Patient completed radiation therapy 2 weeks ago.  He has mild fatigue and skin reaction.  Otherwise overall doing well.  She reports mild numbness and tingling occasionally in her fingertips and toes.  We discussed possibility of Kadcyla induced neuropathy.  No pain.  The symptoms are inconsistent and are not affecting her quality of life at this point therefore we will continue Kadcyla but monitor her closely.\par  We discussed to start endocrine therapy with anastrozole.  Side effects were reviewed.\par  \par  \par  11/2021\par  peripheral neuropathy worse\par  likely 2/2 kadcyla\par  Rec to stop kadcyla at this point \par  She had 13 cycles oh HP, will continue 4 more to finish 1 year\par  She has mild stiffness in fingers. Continue AI for now\par  Monitor closely. If worsen, will switch to exemestane. \par  \par  Patient has high risk HER-2 positive breast cancer.  Patient was not able to get Kadcyla therefore we discussed neratinib.  She will follow-up with me in 1 to 2 months.  If patient is agreeable, we will start neratinib.\par  4/11/2022 We discussed starting neratinib but she does not want additional medications as she is concerned about side effects. [de-identified] : Ms. KELSY CRANE  is here for a follow up appt for left breast ca, dx 8/28/2020  started neoadjuvant TCHP with onpro on 9/30/2020.  She tested positive for Covid by PCR on 10/30/2020.  A repeat test at 2 weeks was positive again on 11/13/2020.  She was admitted on 11/15/2020 with port site infection.  She was treated with IV antibiotics and was discharged on Zyvox.  Subsequently Covid PCR was done x2 and was negative.  She received cycle 3 on 12/2/2020. S/p admission 12/20/2020-12/28/2020 for port infection, port removed. s/p HP #4 on 1/7 (TC held) and TCHP 4, HP 5 on 1/27. TC # 6, HP# 7 on 3/10/2021.  She continued HP every 3 weeks, HP #13 on 7/7/21. Patient underwent bilateral mastectomies with reconstruction on 7/15/2021.  Left mastectomy showed invasive ductal carcinoma, high-grade, 3.4 cm, dermal lymphovascular invasion present, anterior margin 0.2 cm, tumor involved dermis.  14/18 lymph nodes were positive for metastatic disease, largest metastatic focus measured 2.2 cm with extranodal extension. ypT2 yp N3a. Adjuvant Kadycla started 8/18/21.  Radiation completed 9/20201.  Patient developed peripheral neuropathy after 4 doses of Kadcyla and it was stopped in Nov 2021.  She restarted Herceptin Perjeta x4 cycles to finish 1 year of HP. herceptin perjeta x 1 yr completed. [facility]   Anastrozole started 10/2021  Takes Anastrozole daily, good compliance.  She has mild stiffness in her fingers.  She is using Voltaren gel with relief.  She denies aches/pain, hot flashes, vag dryness, excessive fatigue, GI s/e, hair loss. She is active, no change in energy. She has been very busy at work.  Life styles changes d/w her. She has a gym in her new apartment and started wt loss meds. She lost 18 lbs.  She started lexapro and is feeling better mammogram -   not needed DEXA - 7/2022 osteopenia , She takes ca+vit D Neuropathy has improved since stopping Kadcyla. + mild lymphedema, will send for therapy.  echo 9/2021, 12/2021, 8/2022 PICC line removed  08/17/2021 PETCT ALEXX, Nonspecific few small foci of mild FDG activity adjacent to surgical clips in left axilla.   02/23/2022 PET CT  New mildly FDG-avid peripheral pulmonary opacities in left upper lobe and lingula and new 1 cm cyst within peripheral left upper lobe opacity probably are related to interval radiation therapy. No scan evidence of metastatic disease.   CT chest 6/2022: improvement in RT changes, REPEAT 11/2023 ALEXX

## 2024-09-20 NOTE — PHYSICAL EXAM
[Fully active, able to carry on all pre-disease performance without restriction] : Status 0 - Fully active, able to carry on all pre-disease performance without restriction [Normal] : affect appropriate [de-identified] : Port removed, picc line removed [de-identified] : BL mastectomies healing well, mild post RT changes

## 2024-09-20 NOTE — HISTORY OF PRESENT ILLNESS
[de-identified] : Ms.Joanne Damon is a 60 year old female here for an evaluation of breast cancer. Her oncologic history is as follows:\par  \par  Patient noticed left breast mass for 4 months and presented to plastic surgeon's office. She was sent for breast imaging on 8/19/2020 BIRADS 5 which showed new irregular mass with calcifications and nipple retraction in the left retroareolar location on mammography, corresponding to a mass in the left breast 6:00 location 2 cm from the nipple on the concurrent breast ultrasound. Ultrasound-guided core biopsy is recommended for diagnosis.\par  - Abnormal left axillary lymph nodes on mammography and sonography, including lymph nodes demonstrating microcalcifications. Ultrasound-guided core biopsy of a representative abnormal appearing left axillary lymph node with calcifications is recommended.\par  - Faint loosely grouped calcifications in the upper central left breast and the upper central right breast which are indeterminate. Sampling stereotactic biopsy of both breasts is recommended.\par  - Loosely grouped predominantly coarse dystrophic calcifications associated with areas of central lucency in the posterior medial right breast which are thought to represent fat necrosis. Six-month follow-up right diagnostic mammography is recommended. She underwent New irregular mass with calcifications and nipple retraction in the left retroareolar location on mammography, corresponding to a mass in the left breast 6:00 location 2 cm from the nipple on the concurrent breast ultrasound. Ultrasound-guided core biopsy is recommended for diagnosis.\par   She had a left, 6 o'clock, 2 cm FN, core biopsy on on 8/28/2020 which showed Invasive poorly differentiated ductal carcinoma with areas of micropapillary features\par  - Estella score 9/9 (3 + 3 + 3)\par  - Invasive tumor measures at least 1.0 cm\par  - Ductal carcinoma in situ (focal), cribriform pattern with\par  high nuclear grade and central necrosis\par  \par   Breast, left, axillary lymph node, core biopsy\par  - Invasive poorly differentiated ductal carcinoma with mild lymphocytic infiltrate\par  ER+ 80%,VA+ 50%, HER 2 + breast cancer\par  \par  She has been on Prempro x 7 years for hot flashes. She is off now x 2 weeks. Hot flashes are ok\par  She had breast augmentation with Dr Geiger. Implants exchanged 2019.  Benign biopsy in 2019\par  \par  9/30/2020\par  9/25/2020 ECHO LVEF 57%\par  PETCT SKUL-THI ONC  09/18/2020 No evidence of distant metastasis.\par  EKG 2/2020 faxed by PCP , repeat 9/30/2020 done , Chemo consent obtained \par  Right side Medport in place, catheter tip at the distal SVC\par  \par  8/4/2021\par  We discussed pathology report in detail today.  We reviewed that she completed chemo in March 2021.  Follow-up breast MRI showed partial response in the breast and pathologic lymph node had normalized in appearance.  She had significant delay until she finally had surgery in July. Patient underwent bilateral mastectomies with reconstruction on 7/15/2021.  Left mastectomy showed invasive ductal carcinoma, high-grade, 3.4 cm, dermal lymphovascular invasion present, anterior margin 0.2 cm, tumor involved dermis.  14/18 lymph nodes were positive for metastatic disease, largest metastatic focus measured 2.2 cm with extranodal extension. ypT2 yp N3a.  She had multiple positive lymph nodes and is at high risk for local and distant recurrence.\par  We discussed that she is a candidate for postmastectomy radiation therapy.  Patient was very upset and tearful to hear that.  She said she underwent bilateral mastectomies in the hope that she will not need any further treatment.  We discussed that we will not expecting to see so many positive lymph nodes but ER positive disease also does not usually have complete response to neoadjuvant chemo.  I reviewed that typically postmastectomy radiation is considered standard of care for patients with more than 3+ lymph nodes.  She agreed to at least have a consultation with radiation oncology.  Email referral was sent.\par  \par  We discussed data from Bernadette study which showed benefit from TDM 1 in patients with residual disease.  I had discussed the same with the patient prior to surgery and last week over the phone.  She feels her body is okay with medications and she cannot take it anymore.  She is hoping to finish 4 more Herceptin Perjeta to finish 1 year of HER-2 therapy.  Side effects of Kadcyla were reviewed and patient was reassured that side effects are mostly very manageable.  She was again very overwhelmed and tearful and did not want to switch to TDM 1.  She does not have PICC line or port and was concerned about TDM 1x14 cycles.  She wanted to get HP today.\par  \par  We discussed the data for neratinib in high risk HER-2 positive patients.  If she does not agree for TDM 1, she will be a candidate for neratinib.  We discussed that even though it is a pill treatment, GI toxicity is significant.\par  \par  We also discussed that she will take aromatase inhibitors for 10 years after finishing radiation.\par  Patient is very reluctant and hesitant to take postmastectomy radiation and Kadcyla which I believe is necessary to keep her disease-free and long-term.  I recommend that she also seek a second opinion.  She was also encouraged to participate in clinical trials for adjuvant HER-2 positive breast cancer, if offered by second opinion oncologist.\par  \par  Addendum: Patient went to treatment room but then refused treatment with HP.  We will check with her in a week and reschedule either HP or TDM 1 depending on her decision.\par  \par  8/18/21\par  Patient here to start Kadcyla (TDM 1) x14 cycles q 3 weeks tx #1/14 today Counts good\par  Patient expressed concern about hair loss with Kadcyla I explained that hair loss is usually not seen with Kadcyla chemo s/e discussed\par  Medication for symptom management reviewed and questions answered\par  Discussed 08/17/2021 PETCT ALEXX, Nonspecific few small foci of mild FDG activity adjacent to surgical clips in left axilla Will repeat PET CT after completion of TDM1 x 14 cycles \par  Last ECHO 5/24/21 EF 58%. Reminded her to schedule next ECHO  this month.\par  Chemo consent obtained she is scheduled for PICC line insertion \par  Patient is a anxious about PICC line, will give Xanax for use pre procedure\par  Emotional support given\par  \par  10/2021\par  Patient completed radiation therapy 2 weeks ago.  He has mild fatigue and skin reaction.  Otherwise overall doing well.  She reports mild numbness and tingling occasionally in her fingertips and toes.  We discussed possibility of Kadcyla induced neuropathy.  No pain.  The symptoms are inconsistent and are not affecting her quality of life at this point therefore we will continue Kadcyla but monitor her closely.\par  We discussed to start endocrine therapy with anastrozole.  Side effects were reviewed.\par  \par  \par  11/2021\par  peripheral neuropathy worse\par  likely 2/2 kadcyla\par  Rec to stop kadcyla at this point \par  She had 13 cycles oh HP, will continue 4 more to finish 1 year\par  She has mild stiffness in fingers. Continue AI for now\par  Monitor closely. If worsen, will switch to exemestane. \par  \par  Patient has high risk HER-2 positive breast cancer.  Patient was not able to get Kadcyla therefore we discussed neratinib.  She will follow-up with me in 1 to 2 months.  If patient is agreeable, we will start neratinib.\par  4/11/2022 We discussed starting neratinib but she does not want additional medications as she is concerned about side effects. [de-identified] : Ms. KELSY CRANE  is here for a follow up appt for left breast ca, dx 8/28/2020  started neoadjuvant TCHP with onpro on 9/30/2020.  She tested positive for Covid by PCR on 10/30/2020.  A repeat test at 2 weeks was positive again on 11/13/2020.  She was admitted on 11/15/2020 with port site infection.  She was treated with IV antibiotics and was discharged on Zyvox.  Subsequently Covid PCR was done x2 and was negative.  She received cycle 3 on 12/2/2020. S/p admission 12/20/2020-12/28/2020 for port infection, port removed. s/p HP #4 on 1/7 (TC held) and TCHP 4, HP 5 on 1/27. TC # 6, HP# 7 on 3/10/2021.  She continued HP every 3 weeks, HP #13 on 7/7/21. Patient underwent bilateral mastectomies with reconstruction on 7/15/2021.  Left mastectomy showed invasive ductal carcinoma, high-grade, 3.4 cm, dermal lymphovascular invasion present, anterior margin 0.2 cm, tumor involved dermis.  14/18 lymph nodes were positive for metastatic disease, largest metastatic focus measured 2.2 cm with extranodal extension. ypT2 yp N3a. Adjuvant Kadycla started 8/18/21.  Radiation completed 9/20201.  Patient developed peripheral neuropathy after 4 doses of Kadcyla and it was stopped in Nov 2021.  She restarted Herceptin Perjeta x4 cycles to finish 1 year of HP. herceptin perjeta x 1 yr completed. [facility]   Anastrozole started 10/2021  Takes Anastrozole daily, good compliance.  She has mild stiffness in her fingers.  She is using Voltaren gel with relief.  She denies aches/pain, hot flashes, vag dryness, excessive fatigue, GI s/e, hair loss. She is active, no change in energy. She has been very busy at work.  Life styles changes d/w her. She has a gym in her new apartment and started wt loss meds. She lost 18 lbs.  She started lexapro and is feeling better mammogram -   not needed DEXA - 7/2022 osteopenia , She takes ca+vit D Neuropathy has improved since stopping Kadcyla. + mild lymphedema, will send for therapy.  echo 9/2021, 12/2021, 8/2022 PICC line removed  08/17/2021 PETCT ALEXX, Nonspecific few small foci of mild FDG activity adjacent to surgical clips in left axilla.   02/23/2022 PET CT  New mildly FDG-avid peripheral pulmonary opacities in left upper lobe and lingula and new 1 cm cyst within peripheral left upper lobe opacity probably are related to interval radiation therapy. No scan evidence of metastatic disease.   CT chest 6/2022: improvement in RT changes, REPEAT 11/2023 ALEXX

## 2024-09-20 NOTE — ASSESSMENT
[Curative] : Goals of care discussed with patient: Curative [FreeTextEntry1] : Ms. KELSY CRANE  is here for a follow up appt for left breast ca, dx 8/28/2020  started neoadjuvant TCHP with onpro on 9/30/2020.  She tested positive for Covid by PCR on 10/30/2020.  A repeat test at 2 weeks was positive again on 11/13/2020.  She was admitted on 11/15/2020 with port site infection.  She was treated with IV antibiotics and was discharged on Zyvox.  Subsequently Covid PCR was done x2 and was negative.  She received cycle 3 on 12/2/2020. S/p admission 12/20/2020-12/28/2020 for port infection, port removed. s/p HP #4 on 1/7 (TC held) and TCHP 4, HP 5 on 1/27. TC # 6, HP# 7 on 3/10/2021.  She continued HP every 3 weeks, HP #13 on 7/7/21. Patient underwent bilateral mastectomies with reconstruction on 7/15/2021.  Left mastectomy showed invasive ductal carcinoma, high-grade, 3.4 cm, dermal lymphovascular invasion present, anterior margin 0.2 cm, tumor involved dermis.  14/18 lymph nodes were positive for metastatic disease, largest metastatic focus measured 2.2 cm with extranodal extension. ypT2 yp N3a. Adjuvant Kadycla started 8/18/21.  Radiation completed 9/20201.  Patient developed peripheral neuropathy after 4 doses of Kadcyla and it was stopped in Nov 2021.  She restarted Herceptin Perjeta x4 cycles to finish 1 year of HP.  Anastrozole started 10/2021  BREAST CANCER: ER POSITIVE/HER-2/USHA POSITIVE.  Status post neoadjuvant chemotherapy with significant residual disease.  Status post bilateral mastectomy 7/2021.  Patient started Kadcyla 8/2021.  Status post 4 cycles.  She developed peripheral neuropathy, mild pancytopenia and transaminitis secondary to Kadcyla.  Kadcyla was stopped and she completed a year of HP. Patient has high risk node positive, HER-2 positive breast cancer.  Patient was not able to get Kadcyla therefore we discussed neratinib but she does not want additional medications as she is concerned about side effects..  Ms. KELSY CRANE  is tolerating AI well, good compliance. She has mild side effects from the treatment. Continue endocrine x 10 yrs due to high risk node positive disease.  We could consider addition of neratinib after HP but patient does not want additional medications.  -CHEMO INDUCED PERIPHERAL NEUROPATHY: Improved with discontinuation of treatment  -Pulmonary nodules: 08/17/2021 PETCT ALEXX, Nonspecific few small foci of mild FDG activity adjacent to surgical clips in left axilla.   02/23/2022 PET CT  New mildly FDG-avid peripheral pulmonary opacities in left upper lobe and lingula and new 1 cm cyst within peripheral left upper lobe opacity probably are related to interval radiation therapy. No scan evidence of metastatic disease.   CT chest 6/2022: improvement in RT changes REPEAT 11/2023 opacities resolved   - OSTEOPENIA: Concern for worsening bone density and fractures due to anastrozole. Rec to  continue calcium and vit D. DEXA 1-2 yrs.  - High cholesterol/CAD risk factors: Concern for worsening cholesterol/CAD risk factors due to anastrozole. Pt is on simvastatin . Lipid profile annually. Life style modifications d/w her. -Mild aromatase inhibitors-induced arthralgias: Recommend stretching exercises.  Will consider treatment break and switching to exemestane if symptoms worsen.   RTO 6 m

## 2024-09-20 NOTE — BEGINNING OF VISIT
[0] : 2) Feeling down, depressed, or hopeless: Not at all (0) [[id number]] : 0 [Pain Scale: ___] : On a scale of 1-10, today the patient's pain is a(n) [unfilled]. [Never] : Never [Date Discussed (MM/DD/YY): ___] : Discussed: [unfilled] [With Patient/Caregiver] : with Patient/Caregiver

## 2024-09-24 LAB
ALBUMIN SERPL ELPH-MCNC: 4.5 G/DL
ALP BLD-CCNC: 85 U/L
ALT SERPL-CCNC: 23 U/L
ANION GAP SERPL CALC-SCNC: 14 MMOL/L
AST SERPL-CCNC: 24 U/L
BILIRUB SERPL-MCNC: 0.5 MG/DL
BUN SERPL-MCNC: 10 MG/DL
CALCIUM SERPL-MCNC: 10.2 MG/DL
CANCER AG27-29 SERPL-ACNC: 21.7 U/ML
CEA SERPL-MCNC: 1.3 NG/ML
CHLORIDE SERPL-SCNC: 103 MMOL/L
CO2 SERPL-SCNC: 24 MMOL/L
CREAT SERPL-MCNC: 0.79 MG/DL
EGFR: 82 ML/MIN/1.73M2
GLUCOSE SERPL-MCNC: 100 MG/DL
POTASSIUM SERPL-SCNC: 4.8 MMOL/L
PROT SERPL-MCNC: 7.4 G/DL
SODIUM SERPL-SCNC: 141 MMOL/L

## 2024-11-22 ENCOUNTER — RESULT REVIEW (OUTPATIENT)
Age: 64
End: 2024-11-22

## 2024-11-22 ENCOUNTER — APPOINTMENT (OUTPATIENT)
Dept: RADIOLOGY | Facility: CLINIC | Age: 64
End: 2024-11-22
Payer: COMMERCIAL

## 2024-11-22 ENCOUNTER — APPOINTMENT (OUTPATIENT)
Dept: ULTRASOUND IMAGING | Facility: CLINIC | Age: 64
End: 2024-11-22
Payer: COMMERCIAL

## 2024-11-22 PROCEDURE — 77085 DXA BONE DENSITY AXL VRT FX: CPT

## 2024-11-22 PROCEDURE — 76641 ULTRASOUND BREAST COMPLETE: CPT | Mod: LT

## 2024-11-26 ENCOUNTER — NON-APPOINTMENT (OUTPATIENT)
Age: 64
End: 2024-11-26

## 2024-12-25 PROBLEM — F10.90 ALCOHOL USE: Status: ACTIVE | Noted: 2020-09-11

## 2025-01-15 ENCOUNTER — NON-APPOINTMENT (OUTPATIENT)
Age: 65
End: 2025-01-15

## 2025-02-28 ENCOUNTER — OUTPATIENT (OUTPATIENT)
Dept: OUTPATIENT SERVICES | Facility: HOSPITAL | Age: 65
LOS: 1 days | Discharge: HOME | End: 2025-02-28

## 2025-02-28 DIAGNOSIS — C50.919 MALIGNANT NEOPLASM OF UNSPECIFIED SITE OF UNSPECIFIED FEMALE BREAST: ICD-10-CM

## 2025-02-28 DIAGNOSIS — Z98.890 OTHER SPECIFIED POSTPROCEDURAL STATES: Chronic | ICD-10-CM

## 2025-02-28 DIAGNOSIS — Z86.39 PERSONAL HISTORY OF OTHER ENDOCRINE, NUTRITIONAL AND METABOLIC DISEASE: Chronic | ICD-10-CM

## 2025-03-03 ENCOUNTER — NON-APPOINTMENT (OUTPATIENT)
Age: 65
End: 2025-03-03

## 2025-03-03 ENCOUNTER — RESULT REVIEW (OUTPATIENT)
Age: 65
End: 2025-03-03

## 2025-03-03 ENCOUNTER — APPOINTMENT (OUTPATIENT)
Dept: HEMATOLOGY ONCOLOGY | Facility: CLINIC | Age: 65
End: 2025-03-03
Payer: COMMERCIAL

## 2025-03-03 ENCOUNTER — APPOINTMENT (OUTPATIENT)
Dept: HEMATOLOGY ONCOLOGY | Facility: CLINIC | Age: 65
End: 2025-03-03

## 2025-03-03 VITALS
RESPIRATION RATE: 16 BRPM | SYSTOLIC BLOOD PRESSURE: 126 MMHG | DIASTOLIC BLOOD PRESSURE: 83 MMHG | HEIGHT: 61.02 IN | OXYGEN SATURATION: 96 % | HEART RATE: 86 BPM | BODY MASS INDEX: 28.53 KG/M2 | TEMPERATURE: 98.1 F | WEIGHT: 151.12 LBS

## 2025-03-03 DIAGNOSIS — C50.912 MALIGNANT NEOPLASM OF UNSPECIFIED SITE OF LEFT FEMALE BREAST: ICD-10-CM

## 2025-03-03 LAB
BASOPHILS # BLD AUTO: 0.03 K/UL — SIGNIFICANT CHANGE UP (ref 0–0.2)
BASOPHILS NFR BLD AUTO: 0.6 % — SIGNIFICANT CHANGE UP (ref 0–2)
EOSINOPHIL # BLD AUTO: 0.03 K/UL — SIGNIFICANT CHANGE UP (ref 0–0.5)
EOSINOPHIL NFR BLD AUTO: 0.6 % — SIGNIFICANT CHANGE UP (ref 0–6)
HCT VFR BLD CALC: 39.8 % — SIGNIFICANT CHANGE UP (ref 34.5–45)
HGB BLD-MCNC: 13.2 G/DL — SIGNIFICANT CHANGE UP (ref 11.5–15.5)
IMM GRANULOCYTES NFR BLD AUTO: 0.2 % — SIGNIFICANT CHANGE UP (ref 0–0.9)
LYMPHOCYTES # BLD AUTO: 1.23 K/UL — SIGNIFICANT CHANGE UP (ref 1–3.3)
LYMPHOCYTES # BLD AUTO: 24.1 % — SIGNIFICANT CHANGE UP (ref 13–44)
MCHC RBC-ENTMCNC: 29.9 PG — SIGNIFICANT CHANGE UP (ref 27–34)
MCHC RBC-ENTMCNC: 33.2 G/DL — SIGNIFICANT CHANGE UP (ref 32–36)
MCV RBC AUTO: 90.2 FL — SIGNIFICANT CHANGE UP (ref 80–100)
MONOCYTES # BLD AUTO: 0.52 K/UL — SIGNIFICANT CHANGE UP (ref 0–0.9)
MONOCYTES NFR BLD AUTO: 10.2 % — SIGNIFICANT CHANGE UP (ref 2–14)
NEUTROPHILS # BLD AUTO: 3.29 K/UL — SIGNIFICANT CHANGE UP (ref 1.8–7.4)
NEUTROPHILS NFR BLD AUTO: 64.3 % — SIGNIFICANT CHANGE UP (ref 43–77)
NRBC BLD AUTO-RTO: 0 /100 WBCS — SIGNIFICANT CHANGE UP (ref 0–0)
PLATELET # BLD AUTO: 218 K/UL — SIGNIFICANT CHANGE UP (ref 150–400)
RBC # BLD: 4.41 M/UL — SIGNIFICANT CHANGE UP (ref 3.8–5.2)
RBC # FLD: 12.7 % — SIGNIFICANT CHANGE UP (ref 10.3–14.5)
WBC # BLD: 5.11 K/UL — SIGNIFICANT CHANGE UP (ref 3.8–10.5)
WBC # FLD AUTO: 5.11 K/UL — SIGNIFICANT CHANGE UP (ref 3.8–10.5)

## 2025-03-03 PROCEDURE — 99214 OFFICE O/P EST MOD 30 MIN: CPT

## 2025-03-03 PROCEDURE — G2211 COMPLEX E/M VISIT ADD ON: CPT | Mod: NC

## 2025-03-04 LAB
ALBUMIN SERPL ELPH-MCNC: 4 G/DL
ALP BLD-CCNC: 72 U/L
ALT SERPL-CCNC: 21 U/L
ANION GAP SERPL CALC-SCNC: 13 MMOL/L
AST SERPL-CCNC: 20 U/L
BILIRUB SERPL-MCNC: 0.3 MG/DL
BUN SERPL-MCNC: 10 MG/DL
CALCIUM SERPL-MCNC: 10 MG/DL
CANCER AG27-29 SERPL-ACNC: 16.6 U/ML
CEA SERPL-MCNC: 1 NG/ML
CHLORIDE SERPL-SCNC: 106 MMOL/L
CO2 SERPL-SCNC: 24 MMOL/L
CREAT SERPL-MCNC: 0.53 MG/DL
EGFRCR SERPLBLD CKD-EPI 2021: 102 ML/MIN/1.73M2
GLUCOSE SERPL-MCNC: 102 MG/DL
POTASSIUM SERPL-SCNC: 4.4 MMOL/L
PROT SERPL-MCNC: 6.7 G/DL
SODIUM SERPL-SCNC: 143 MMOL/L

## 2025-04-22 ENCOUNTER — APPOINTMENT (OUTPATIENT)
Dept: PHYSICAL MEDICINE AND REHAB | Facility: CLINIC | Age: 65
End: 2025-04-22
Payer: COMMERCIAL

## 2025-04-22 ENCOUNTER — NON-APPOINTMENT (OUTPATIENT)
Age: 65
End: 2025-04-22

## 2025-04-22 DIAGNOSIS — I89.0 LYMPHEDEMA, NOT ELSEWHERE CLASSIFIED: ICD-10-CM

## 2025-04-22 PROCEDURE — 99204 OFFICE O/P NEW MOD 45 MIN: CPT

## 2025-06-24 ENCOUNTER — APPOINTMENT (OUTPATIENT)
Dept: PLASTIC SURGERY | Facility: CLINIC | Age: 65
End: 2025-06-24
Payer: COMMERCIAL

## 2025-06-24 VITALS
RESPIRATION RATE: 16 BRPM | WEIGHT: 151 LBS | BODY MASS INDEX: 28.51 KG/M2 | HEIGHT: 61.02 IN | DIASTOLIC BLOOD PRESSURE: 67 MMHG | OXYGEN SATURATION: 99 % | SYSTOLIC BLOOD PRESSURE: 118 MMHG | HEART RATE: 82 BPM

## 2025-06-24 PROCEDURE — 99215 OFFICE O/P EST HI 40 MIN: CPT

## 2025-09-05 ENCOUNTER — APPOINTMENT (OUTPATIENT)
Dept: HEMATOLOGY ONCOLOGY | Facility: CLINIC | Age: 65
End: 2025-09-05
Payer: COMMERCIAL

## 2025-09-05 ENCOUNTER — RESULT REVIEW (OUTPATIENT)
Age: 65
End: 2025-09-05

## 2025-09-05 VITALS
WEIGHT: 140 LBS | OXYGEN SATURATION: 97 % | SYSTOLIC BLOOD PRESSURE: 124 MMHG | DIASTOLIC BLOOD PRESSURE: 84 MMHG | HEART RATE: 80 BPM | BODY MASS INDEX: 26.44 KG/M2 | TEMPERATURE: 97.5 F | RESPIRATION RATE: 16 BRPM

## 2025-09-05 DIAGNOSIS — I89.0 LYMPHEDEMA, NOT ELSEWHERE CLASSIFIED: ICD-10-CM

## 2025-09-05 DIAGNOSIS — C50.912 MALIGNANT NEOPLASM OF UNSPECIFIED SITE OF LEFT FEMALE BREAST: ICD-10-CM

## 2025-09-05 PROCEDURE — G2211 COMPLEX E/M VISIT ADD ON: CPT | Mod: NC

## 2025-09-05 PROCEDURE — 99214 OFFICE O/P EST MOD 30 MIN: CPT

## 2025-09-09 LAB
ALBUMIN SERPL ELPH-MCNC: 4.1 G/DL
ALP BLD-CCNC: 77 U/L
ALT SERPL-CCNC: 24 U/L
ANION GAP SERPL CALC-SCNC: 11 MMOL/L
AST SERPL-CCNC: 23 U/L
BILIRUB SERPL-MCNC: 0.4 MG/DL
BUN SERPL-MCNC: 10 MG/DL
CALCIUM SERPL-MCNC: 9.9 MG/DL
CANCER AG27-29 SERPL-ACNC: 15.9 U/ML
CEA SERPL-MCNC: 1.2 NG/ML
CHLORIDE SERPL-SCNC: 106 MMOL/L
CO2 SERPL-SCNC: 25 MMOL/L
CREAT SERPL-MCNC: 0.52 MG/DL
EGFRCR SERPLBLD CKD-EPI 2021: 102 ML/MIN/1.73M2
GLUCOSE SERPL-MCNC: 125 MG/DL
POTASSIUM SERPL-SCNC: 4.7 MMOL/L
PROT SERPL-MCNC: 6.7 G/DL
SODIUM SERPL-SCNC: 142 MMOL/L